# Patient Record
Sex: FEMALE | Race: BLACK OR AFRICAN AMERICAN | NOT HISPANIC OR LATINO | Employment: UNEMPLOYED | URBAN - METROPOLITAN AREA
[De-identification: names, ages, dates, MRNs, and addresses within clinical notes are randomized per-mention and may not be internally consistent; named-entity substitution may affect disease eponyms.]

---

## 2017-11-28 ENCOUNTER — HOSPITAL ENCOUNTER (EMERGENCY)
Facility: HOSPITAL | Age: 62
Discharge: HOME | End: 2017-11-28
Attending: FAMILY MEDICINE
Payer: MEDICAID

## 2017-11-28 VITALS
DIASTOLIC BLOOD PRESSURE: 85 MMHG | TEMPERATURE: 99 F | HEART RATE: 63 BPM | HEIGHT: 68 IN | RESPIRATION RATE: 18 BRPM | SYSTOLIC BLOOD PRESSURE: 180 MMHG | OXYGEN SATURATION: 98 % | WEIGHT: 200 LBS | BODY MASS INDEX: 30.31 KG/M2

## 2017-11-28 DIAGNOSIS — S39.012A LUMBAR STRAIN, INITIAL ENCOUNTER: Primary | ICD-10-CM

## 2017-11-28 DIAGNOSIS — M54.42 ACUTE LEFT-SIDED LOW BACK PAIN WITH LEFT-SIDED SCIATICA: ICD-10-CM

## 2017-11-28 PROCEDURE — 99284 EMERGENCY DEPT VISIT MOD MDM: CPT | Mod: ,,, | Performed by: FAMILY MEDICINE

## 2017-11-28 PROCEDURE — 96372 THER/PROPH/DIAG INJ SC/IM: CPT

## 2017-11-28 PROCEDURE — 63600175 PHARM REV CODE 636 W HCPCS: Performed by: FAMILY MEDICINE

## 2017-11-28 PROCEDURE — 99284 EMERGENCY DEPT VISIT MOD MDM: CPT | Mod: 25

## 2017-11-28 RX ORDER — ORPHENADRINE CITRATE 30 MG/ML
60 INJECTION INTRAMUSCULAR; INTRAVENOUS
Status: COMPLETED | OUTPATIENT
Start: 2017-11-28 | End: 2017-11-28

## 2017-11-28 RX ORDER — KETOROLAC TROMETHAMINE 30 MG/ML
30 INJECTION, SOLUTION INTRAMUSCULAR; INTRAVENOUS
Status: COMPLETED | OUTPATIENT
Start: 2017-11-28 | End: 2017-11-28

## 2017-11-28 RX ORDER — CYCLOBENZAPRINE HCL 10 MG
10 TABLET ORAL 3 TIMES DAILY PRN
Qty: 20 TABLET | Refills: 0 | Status: SHIPPED | OUTPATIENT
Start: 2017-11-28 | End: 2018-02-16

## 2017-11-28 RX ORDER — TRAMADOL HYDROCHLORIDE 50 MG/1
50-100 TABLET ORAL EVERY 6 HOURS PRN
Qty: 16 TABLET | Refills: 0 | Status: SHIPPED | OUTPATIENT
Start: 2017-11-28 | End: 2019-09-11

## 2017-11-28 RX ADMIN — KETOROLAC TROMETHAMINE 30 MG: 30 INJECTION, SOLUTION INTRAMUSCULAR at 09:11

## 2017-11-28 RX ADMIN — ORPHENADRINE CITRATE 60 MG: 30 INJECTION INTRAMUSCULAR; INTRAVENOUS at 09:11

## 2017-11-29 NOTE — ED TRIAGE NOTES
Patient reports lower back pain that radiates down the right leg. Patient was lifting and pushing heavy packages earlier today before the pain began. Patient took ibuprofen at approx. 1530 today.

## 2017-11-29 NOTE — ED PROVIDER NOTES
Encounter Date: 11/28/2017    SCRIBE #1 NOTE: I, Jadyn Boyce, am scribing for, and in the presence of,  Dr. Manzo. I have scribed the following portions of the note - Other sections scribed: HPI, ROS, Physical Exam.       History     Chief Complaint   Patient presents with    Back Pain     picked up heavy tv c/o of R leg pain      Time patient was seen by the provider: 8:40 PM      The patient is a 62 y.o. female with hx of: back pain with sciatica that presents to the ED with a complaint of back pain with associated leg pain. Pt tried to move a heavy TV and feels as thought she might have strained her sciatic nerve. She endorses right foot swelling as well. Pt was unable to drive here herself due to severity of pain.        The history is provided by the patient and medical records.     Review of patient's allergies indicates:  No Known Allergies  Past Medical History:   Diagnosis Date    Arthritis     Back pain with sciatica     Diabetes mellitus     Hypertension     Stroke      History reviewed. No pertinent surgical history.  Family History   Problem Relation Age of Onset    Cancer Brother      Social History   Substance Use Topics    Smoking status: Smoker, Current Status Unknown     Packs/day: 0.50     Years: 40.00     Types: Cigarettes    Smokeless tobacco: Never Used    Alcohol use No      Comment: Recently stopped     Review of Systems   Musculoskeletal: Positive for back pain (radiates to right leg).       Physical Exam     Initial Vitals [11/28/17 1827]   BP Pulse Resp Temp SpO2   (!) 170/90 86 18 -- 100 %      MAP       116.67         Physical Exam    Constitutional:   Pt is obviously uncomfortable but cooperative with exam.    HENT:   Head: Normocephalic and atraumatic.   Cardiovascular: Normal rate, regular rhythm and normal heart sounds.   Pulmonary/Chest: Breath sounds normal.   Abdominal: Soft. Bowel sounds are normal.   Musculoskeletal:   Pt's lower back is tender in the right flank  and midline lumbar spine with no gross deformities and no visual abnormalities. Straight leg raises reproduces pt's right leg pain.   Neurological:   No acute focal deficits .          ED Course   Procedures  Labs Reviewed - No data to display          Medical Decision Making:   History:   Old Medical Records: I decided to obtain old medical records.  ED Management:  Lumbar muscle spasm precipitated by moving a heavy object and has no red flags suggestive of infection, cauda equina syndrome, or other emergent nerve root compression or circulatory compromise.  She is treated with ketorolac and Norflex for acute symptoms and discharged on tramadol and Flexeril for symptom control.  PCP follow-up recommended            Scribe Attestation:   Scribe #1: I performed the above scribed service and the documentation accurately describes the services I performed. I attest to the accuracy of the note.            ED Course      Clinical Impression:   There were no encounter diagnoses.                           Carlos Manzo MD  11/29/17 1010

## 2018-02-16 ENCOUNTER — HOSPITAL ENCOUNTER (EMERGENCY)
Facility: OTHER | Age: 63
Discharge: HOME | End: 2018-02-16
Attending: EMERGENCY MEDICINE
Payer: MEDICARE

## 2018-02-16 VITALS
RESPIRATION RATE: 17 BRPM | DIASTOLIC BLOOD PRESSURE: 87 MMHG | SYSTOLIC BLOOD PRESSURE: 161 MMHG | HEART RATE: 84 BPM | TEMPERATURE: 98 F | OXYGEN SATURATION: 98 %

## 2018-02-16 DIAGNOSIS — M54.41 CHRONIC RIGHT-SIDED LOW BACK PAIN WITH RIGHT-SIDED SCIATICA: ICD-10-CM

## 2018-02-16 DIAGNOSIS — M54.31 RIGHT SIDED SCIATICA: Primary | ICD-10-CM

## 2018-02-16 DIAGNOSIS — G89.29 CHRONIC RIGHT-SIDED LOW BACK PAIN WITH RIGHT-SIDED SCIATICA: ICD-10-CM

## 2018-02-16 LAB
AMPHET+METHAMPHET UR QL: NEGATIVE
BARBITURATES UR QL SCN>200 NG/ML: NEGATIVE
BENZODIAZ UR QL SCN>200 NG/ML: NORMAL
BILIRUB UR QL STRIP: NEGATIVE
BZE UR QL SCN: NEGATIVE
CANNABINOIDS UR QL SCN: NEGATIVE
CLARITY UR: CLEAR
COLOR UR: YELLOW
CREAT UR-MCNC: 75.2 MG/DL
GLUCOSE UR QL STRIP: NEGATIVE
HGB UR QL STRIP: NEGATIVE
KETONES UR QL STRIP: NEGATIVE
LEUKOCYTE ESTERASE UR QL STRIP: NEGATIVE
METHADONE UR QL SCN>300 NG/ML: NEGATIVE
NITRITE UR QL STRIP: NEGATIVE
OPIATES UR QL SCN: NORMAL
PCP UR QL SCN>25 NG/ML: NEGATIVE
PH UR STRIP: 5 [PH] (ref 5–8)
POCT GLUCOSE: 90 MG/DL (ref 70–110)
PROT UR QL STRIP: NEGATIVE
SP GR UR STRIP: 1.01 (ref 1–1.03)
TOXICOLOGY INFORMATION: NORMAL
URN SPEC COLLECT METH UR: NORMAL
UROBILINOGEN UR STRIP-ACNC: NEGATIVE EU/DL

## 2018-02-16 PROCEDURE — 99284 EMERGENCY DEPT VISIT MOD MDM: CPT | Mod: 25

## 2018-02-16 PROCEDURE — 96372 THER/PROPH/DIAG INJ SC/IM: CPT

## 2018-02-16 PROCEDURE — 81003 URINALYSIS AUTO W/O SCOPE: CPT | Mod: 59

## 2018-02-16 PROCEDURE — 63600175 PHARM REV CODE 636 W HCPCS: Performed by: EMERGENCY MEDICINE

## 2018-02-16 PROCEDURE — 82962 GLUCOSE BLOOD TEST: CPT

## 2018-02-16 PROCEDURE — 80307 DRUG TEST PRSMV CHEM ANLYZR: CPT

## 2018-02-16 RX ORDER — IBUPROFEN 400 MG/1
400 TABLET ORAL 3 TIMES DAILY PRN
Qty: 20 TABLET | Refills: 0 | Status: ON HOLD | OUTPATIENT
Start: 2018-02-16 | End: 2018-12-20 | Stop reason: HOSPADM

## 2018-02-16 RX ORDER — KETOROLAC TROMETHAMINE 30 MG/ML
15 INJECTION, SOLUTION INTRAMUSCULAR; INTRAVENOUS
Status: COMPLETED | OUTPATIENT
Start: 2018-02-16 | End: 2018-02-16

## 2018-02-16 RX ORDER — CYCLOBENZAPRINE HCL 10 MG
10 TABLET ORAL 3 TIMES DAILY PRN
Qty: 12 TABLET | Refills: 0 | Status: ON HOLD | OUTPATIENT
Start: 2018-02-16 | End: 2018-12-20 | Stop reason: HOSPADM

## 2018-02-16 RX ADMIN — KETOROLAC TROMETHAMINE 15 MG: 30 INJECTION, SOLUTION INTRAMUSCULAR at 04:02

## 2018-02-16 NOTE — ED NOTES
Pt requesting vicodin and to speak with MD. Explained to pt opiate policy. MD to bedside to speak with pt

## 2018-02-16 NOTE — ED TRIAGE NOTES
"Pt presents with right sided sciatica, onset this morning with waking up. Pt reports pain to bilateral lower back and right leg. Pt reports having trouble ambulating due to pain. Pt also reporting right arm weakness. Pt reports right ankle is swollen, no marked edema noted. Pt has hx sciatica but reports "its never this bad". Pt denies any recent injury or trauma. Pt has been taking naproxen, tylenol, and ibuprofen, denies taking any opiates for pain. Pt reports taking a dose of tylenol and ibuprofen every hour since 1200. Pt has hx of stroke, denies any resulting deficits. Pt not following directions for NIH scale, having to repeats questions and commands a few times until pt responds. Pt was alert answering questions in their entirety for MD, pt now acting drowsy and closing eyes after starting a task or sentence. Pt arouses to voice.   "

## 2018-02-16 NOTE — ED NOTES
Pt requesting food, explained to pt the risk of eating prior to receiving CT results. Reviewed with ching OROURKE to provide pt with crackers and juice.

## 2018-02-16 NOTE — ED PROVIDER NOTES
Encounter Date: 2/16/2018    SCRIBE #1 NOTE: I, Laura Alcantara, am scribing for, and in the presence of, Dr. Issa.       History     Chief Complaint   Patient presents with    Sciatica     Has history of this but states it has never been this bad before.  States that since early this morning, she has been taking 2 Naproxen every hour, 2 Tylenol and 2 Ibuprofen     Time seen by provider: 4:00 PM    This is a 62 y.o. female, with a history of sciatica, DM, HTN, and stroke, who presents with complaint of back pain that began this morning. She states pain radiates down the right leg. She describes pain as sharp. She rates pain as a 9 out of 10. She reports taking naproxen and tylenol with little relief. She states current episodes the worse than previous episodes. She denies urinary incontinence, bowel incontinence, numbness, or weakness. She denies trauma or injury. Patient states she needed assistance getting out of bed secondary to pain.       The history is provided by the patient.     Review of patient's allergies indicates:  No Known Allergies  Past Medical History:   Diagnosis Date    Arthritis     Back pain with sciatica     Diabetes mellitus     Hypertension     Stroke      History reviewed. No pertinent surgical history.  Family History   Problem Relation Age of Onset    Cancer Brother      Social History   Substance Use Topics    Smoking status: Current Some Day Smoker     Packs/day: 0.50     Years: 40.00     Types: Cigarettes    Smokeless tobacco: Never Used    Alcohol use No      Comment: Recently stopped     Review of Systems   Constitutional: Negative for fever.   HENT: Negative for sore throat.    Respiratory: Negative for shortness of breath.    Cardiovascular: Negative for chest pain.   Gastrointestinal: Negative for nausea.   Genitourinary: Negative for dysuria.        Negative for urinary incontinence. Negative for bowel incontinence.    Musculoskeletal: Positive for back pain.         Positive for right leg pain.    Skin: Negative for rash.   Neurological: Negative for weakness and numbness.   Hematological: Does not bruise/bleed easily.       Physical Exam     Initial Vitals [02/16/18 1531]   BP Pulse Resp Temp SpO2   (!) 136/91 88 17 98.1 °F (36.7 °C) (!) 88 %      MAP       106         Physical Exam    Nursing note and vitals reviewed.  Constitutional: She appears well-developed and well-nourished. She is not diaphoretic. No distress.   HENT:   Head: Normocephalic and atraumatic.   Right Ear: External ear normal.   Left Ear: External ear normal.   Eyes: EOM are normal. Right eye exhibits no discharge. Left eye exhibits no discharge.   Neck: Normal range of motion.   Cardiovascular: Normal rate, regular rhythm and normal heart sounds. Exam reveals no gallop and no friction rub.    No murmur heard.  Pulmonary/Chest: Breath sounds normal. No respiratory distress. She has no wheezes. She has no rhonchi. She has no rales.   Abdominal: Soft. There is no tenderness. There is no rebound and no guarding.   Musculoskeletal: Normal range of motion. She exhibits no edema.   Midline lumbar tenderness with no crepitus, step-offs, or deformities. Bilateral lumbar tenderness without significant spasm. Positive straight leg test on the right at 10 degrees. Negative straight leg test on the left. Bilateral lower extremity strength 5/5 tested at hip, knees, ankles, and great toes.    Neurological: She is alert and oriented to person, place, and time.   Slowed speech with no slurring. Delayed responses to questions. Normal sensation intact to bilateral lower extremities.    Skin: Skin is warm and dry. No rash and no abscess noted. No erythema. No pallor.         ED Course   Procedures  Labs Reviewed   URINALYSIS   DRUG SCREEN PANEL, URINE EMERGENCY            Imaging Results          CT Head Without Contrast (In process)                 Medical Decision Making:   Clinical Tests:   Lab Tests: Ordered and  Reviewed  Radiological Study: Ordered and Reviewed  ED Management:  Patient presents complaining of acute on chronic right back and leg pain.  Reviewing her chart she does extensive history of sciatica.  She denies any trauma this time.  She has absolute no weakness at all.  No loss of bladder or bowel control.  No neurologic other symptoms.    Patient also has delayed speech is not slurred, but she seems delayed in her responses.  She does have history of stroke, but has no other signs of stroke.  She denies any drug use today.  Denies any narcotic pain medicines at home.  CAT scan of her head with no acute abnormalities.  She does seem to clear while here, and her urine drug screen does reveal both presumed opioids and benzodiazepines.    I plan to treat her chronic back pain with ketorolac and Flexeril.  She asked multiple times for Vicodin or tramadol.  Extensive discussion with her that I will not prescribe narcotics for chronic back pain.  She is very upset with this.    While we are waiting for her urinalysis, she is noted to get up and walk around the hallway without difficulty.  She ask repeatedly for food and sandwiches.    At time of discharge she indicates that she is very dissatisfied with her treatment and that she did not get narcotics.  She reports that she has been recording all of our conversations.     I attempted to have further discharge planning and counseling, but she refuses to talk to me any longer.    ALTON Issa M.D.  02/16/2018  9:54 PM                Attending Attestation:           Physician Attestation for Scribe:  Physician Attestation Statement for Scribe #1: I, Dr. Issa, reviewed documentation, as scribed by Laura Alcantara in my presence, and it is both accurate and complete.                    Clinical Impression:     1. Right sided sciatica    2. Chronic right-sided low back pain with right-sided sciatica                               Major Issa MD  02/16/18 7062

## 2018-02-16 NOTE — ED NOTES
Pt removing pulse ox and BP cuff so that she can leave to go get a meal. Pt insistent upon eating reports she has not eaten since yesterday at 0600. Pt reports she checked her BG this AM and it was 256. Rechecked BG, BG 90. Provided pt with more crackers and juice. Pt able to ambulate to restroom, pt gait slightly unsteady.

## 2018-02-17 NOTE — ED NOTES
"Pt voicing concern about not being sent home with opiates. Pt reporting "this is unheard of, I can hardly walk" "these people aren't doing anything for me." Explained [facility] policy and offered for pt to speak with the MD. Pt declining speaking with the doctor again. Pt refusing final set of vitals signs. Pt reporting that she recorded conversations between her and the doctor incase they are needed later.   "

## 2018-02-17 NOTE — ED NOTES
NEURO: Pt AAO x 4; see hpi   CARDIAC: Regular rhythm auscultated, pt denies chest pain  RESPIRATORY: Respirations even and unlabored.   MUSCULOSKELETAL: Active ROM noted to extremities

## 2018-02-17 NOTE — ED NOTES
Pt ambulating out of room and holding on to wall for support, offered pt wheelchair. Pt accepted and placed in front of ramp for ride. Pt originally declined ibuprofen and flexeril, pt now requesting these prescriptions from MD. MD spoke with pt and prescriptions provided.

## 2018-05-21 ENCOUNTER — OFFICE VISIT (OUTPATIENT)
Dept: URGENT CARE | Facility: CLINIC | Age: 63
End: 2018-05-21
Payer: MEDICARE

## 2018-05-21 VITALS
DIASTOLIC BLOOD PRESSURE: 82 MMHG | SYSTOLIC BLOOD PRESSURE: 160 MMHG | TEMPERATURE: 98 F | HEART RATE: 77 BPM | BODY MASS INDEX: 28.49 KG/M2 | HEIGHT: 68 IN | RESPIRATION RATE: 16 BRPM | OXYGEN SATURATION: 98 % | WEIGHT: 188 LBS

## 2018-05-21 DIAGNOSIS — G89.29 ACUTE EXACERBATION OF CHRONIC LOW BACK PAIN: Primary | ICD-10-CM

## 2018-05-21 DIAGNOSIS — M54.50 ACUTE EXACERBATION OF CHRONIC LOW BACK PAIN: Primary | ICD-10-CM

## 2018-05-21 PROCEDURE — 99203 OFFICE O/P NEW LOW 30 MIN: CPT | Mod: 25,S$GLB,, | Performed by: NURSE PRACTITIONER

## 2018-05-21 PROCEDURE — 96372 THER/PROPH/DIAG INJ SC/IM: CPT | Mod: S$GLB,,, | Performed by: FAMILY MEDICINE

## 2018-05-21 RX ORDER — CYCLOBENZAPRINE HCL 10 MG
10 TABLET ORAL NIGHTLY
Qty: 15 TABLET | Refills: 0 | Status: SHIPPED | OUTPATIENT
Start: 2018-05-21 | End: 2018-06-05

## 2018-05-21 RX ORDER — KETOROLAC TROMETHAMINE 30 MG/ML
30 INJECTION, SOLUTION INTRAMUSCULAR; INTRAVENOUS
Status: COMPLETED | OUTPATIENT
Start: 2018-05-21 | End: 2018-05-21

## 2018-05-21 RX ADMIN — KETOROLAC TROMETHAMINE 30 MG: 30 INJECTION, SOLUTION INTRAMUSCULAR; INTRAVENOUS at 12:05

## 2018-05-21 NOTE — PROGRESS NOTES
"Subjective:       Patient ID: Kaila Scott is a 62 y.o. female.    Vitals:  height is 5' 8" (1.727 m) and weight is 85.3 kg (188 lb). Her temperature is 98.3 °F (36.8 °C). Her blood pressure is 160/82 (abnormal) and her pulse is 77. Her respiration is 16 and oxygen saturation is 98%.     Chief Complaint: Back Pain    Pt states her sciatica is acting up, but pt states she did trip over her cat yesterday.   HER SPEECH IS SLURRED AND HER CONVERSATIONS ARE JUMPING BACK AND FORTH. SHE IS WEARING 2 DIFFERENT SHOES. SHE DENIES HAVING A HISTORY OF BACK PAIN AND DENIES USING PAIN MEDICINE IN THE PAST FOR HER RECORDED BACK PAIN HISTORY.   SHE DID ASK SPECIFICALLY FOR VI CODAN AND STATED NOTHING ELSE WORKS FOR HER BACK PAIN.  REPORTS SHOOTING PAIN FROM LOWER BACK TO HIP.    HER BP /82 AND RECOMMENDED PATIENT FOLLOW UP WITH [facility] FOR WELLNESS EXAM.      Back Pain   This is a recurrent problem. The problem occurs constantly. The problem is unchanged. The quality of the pain is described as stabbing. The pain is at a severity of 9/10. The pain is severe. The symptoms are aggravated by bending, standing and sitting. Pertinent negatives include no abdominal pain, bladder incontinence, bowel incontinence, dysuria or numbness.     Review of Systems   Constitution: Negative for malaise/fatigue.   Skin: Negative for color change and rash.   Musculoskeletal: Positive for back pain. Negative for muscle cramps, muscle weakness and stiffness.   Gastrointestinal: Negative for abdominal pain and bowel incontinence.   Genitourinary: Negative for bladder incontinence, dysuria, hematuria and urgency.   Neurological: Negative for disturbances in coordination and numbness.       Objective:      Physical Exam   Constitutional: She is oriented to person, place, and time. She appears well-developed and well-nourished. She is cooperative.  Non-toxic appearance. She does not appear ill. No distress.   HENT:   Head: Normocephalic " and atraumatic.   Right Ear: Hearing, tympanic membrane, external ear and ear canal normal.   Left Ear: Hearing, tympanic membrane, external ear and ear canal normal.   Nose: Nose normal. No mucosal edema, rhinorrhea or nasal deformity. No epistaxis. Right sinus exhibits no maxillary sinus tenderness and no frontal sinus tenderness. Left sinus exhibits no maxillary sinus tenderness and no frontal sinus tenderness.   Mouth/Throat: Uvula is midline, oropharynx is clear and moist and mucous membranes are normal. No trismus in the jaw. Normal dentition. No uvula swelling. No posterior oropharyngeal erythema.   Eyes: Conjunctivae and lids are normal. No scleral icterus.   Sclera clear bilat   Neck: Trachea normal, full passive range of motion without pain and phonation normal. Neck supple.   Cardiovascular: Normal rate, regular rhythm, normal heart sounds, intact distal pulses and normal pulses.    Pulmonary/Chest: Effort normal and breath sounds normal. No respiratory distress.   Abdominal: Soft. Normal appearance and bowel sounds are normal. She exhibits no distension. There is no tenderness.   Musculoskeletal: She exhibits no edema or deformity.        Lumbar back: She exhibits decreased range of motion and tenderness. She exhibits no bony tenderness, no swelling, no edema, no deformity, no laceration and normal pulse.   NEGATIVE STRAIGHT LEG RAISE.  ABLE TO AMBULATE WITH DIFFICULTY. NEGATIVE TTP ALONG SPINE.   Neurological: She is alert and oriented to person, place, and time. She exhibits normal muscle tone. Coordination normal.   Skin: Skin is warm, dry and intact. She is not diaphoretic. No pallor.   Psychiatric: She has a normal mood and affect. Her speech is normal and behavior is normal. Judgment and thought content normal. Cognition and memory are normal.   Nursing note and vitals reviewed.      Assessment:       1. Acute exacerbation of chronic low back pain        Plan:         Acute exacerbation of chronic  low back pain  -     ketorolac injection 30 mg; Inject 1 mL (30 mg total) into the muscle one time.  -     cyclobenzaprine (FLEXERIL) 10 MG tablet; Take 1 tablet (10 mg total) by mouth every evening.  Dispense: 15 tablet; Refill: 0      Patient Instructions   TAKE MOTRIN 400MG EVERY 6 HOURS AS NEEDED FOR PAIN.  YOU RECEIVED A TORADOL INJECTION TODAY, START THE MOTRIN TOMORROW.  TAKE RX FLEXERIL AT NIGHT, BEFORE BED, TO HELP WITH SLEEP. PLEASE DO NOT OPERATE HEAVY MACHINERY.    YOU NEED TO FOLLOW UP WITH [facility] IF YOUR BACK PAIN PERSISTS IN THE NEXT WEEK FOR BACK PAIN MANAGEMENT.    APPLY ICE FOR 20MIN AT A TIME SEVERAL TIMES THROUGHOUT THE DAY UNTIL TOMORROW AFTERNOON.   THEN, STARTING Wednesday, YOU CAN APPLY HEAT TO THE SITE FOR COMFORT.    SEE HANDOUT ON BACK EXERCISES.          Back Pain (Acute or Chronic)    Back pain is one of the most common problems. The good news is that most people feel better in 1 to 2 weeks, and most of the rest in 1 to 2 months. Most people can remain active.  People experience and describe pain differently; not everyone is the same.  · The pain can be sharp, stabbing, shooting, aching, cramping or burning.  · Movement, standing, bending, lifting, sitting, or walking may worsen pain.  · It can be localized to one spot or area, or it can be more generalized.  · It can spread or radiate upwards, to the front, or go down your arms or legs (sciatica).  · It can cause muscle spasm.  Most of the time, mechanical problems with the muscles or spine cause the pain. Mechanical problems are usually caused by an injury to the muscles or ligaments. While illness can cause back pain, it is usually not caused by a serious illness. Mechanical problems include:   · Physical activity such as sports, exercise, work, or normal activity  · Overexertion, lifting, pushing, pulling incorrectly or too aggressively  · Sudden twisting, bending, or stretching from an accident, or accidental  movement  · Poor posture  · Stretching or moving wrong, without noticing pain at the time  · Poor coordination, lack of regular exercise (check with your doctor about this)  · Spinal disc disease or arthritis  · Stress  Pain can also be related to pregnancy, or illness like appendicitis, bladder or kidney infections, pelvic infections, and many other things.  Acute back pain usually gets better in 1 to 2 weeks. Back pain related to disk disease, arthritis in the spinal joints or spinal stenosis (narrowing of the spinal canal) can become chronic and last for months or years.  Unless you had a physical injury (for example, a car accident or fall) X-rays are usually not needed for the initial evaluation of back pain. If pain continues and does not respond to medical treatment, X-rays and other tests may be needed.  Home care  Try these home care recommendations:  · When in bed, try to find a position of comfort. A firm mattress is best. Try lying flat on your back with pillows under your knees. You can also try lying on your side with your knees bent up towards your chest and a pillow between your knees.  · At first, do not try to stretch out the sore spots. If there is a strain, it is not like the good soreness you get after exercising without an injury. In this case, stretching may make it worse.  · Avoid prolong sitting, long car rides, or travel. This puts more stress on the lower back than standing or walking.  · During the first 24 to 72 hours after an acute injury or flare up of chronic back pain, apply an ice pack to the painful area for 20 minutes and then remove it for 20 minutes. Do this over a period of 60 to 90 minutes or several times a day. This will reduce swelling and pain. Wrap the ice pack in a thin towel or plastic to protect your skin.  · You can start with ice, then switch to heat. Heat (hot shower, hot bath, or heating pad) reduces pain and works well for muscle spasms. Heat can be applied to the  painful area for 20 minutes then remove it for 20 minutes. Do this over a period of 60 to 90 minutes or several times a day. Do not sleep on a heating pad. It can lead to skin burns or tissue damage.  · You can alternate ice and heat therapy. Talk with your doctor about the best treatment for your back pain.  · Therapeutic massage can help relax the back muscles without stretching them.  · Be aware of safe lifting methods and do not lift anything without stretching first.  Medicines  Talk to your doctor before using medicine, especially if you have other medical problems or are taking other medicines.  · You may use over-the-counter medicine as directed on the bottle to control pain, unless another pain medicine was prescribed. If you have chronic conditions like diabetes, liver or kidney disease, stomach ulcers, or gastrointestinal bleeding, or are taking blood thinners, talk to your doctor before taking any medicine.  · Be careful if you are given a prescription medicines, narcotics, or medicine for muscle spasms. They can cause drowsiness, affect your coordination, reflexes, and judgement. Do not drive or operate heavy machinery.  Follow-up care  Follow up with your healthcare provider, or as advised.   A radiologist will review any X-rays that were taken. Your provide will notify you of any new findings that may affect your care.  Call 911  Call emergency services if any of the following occur:  · Trouble breathing  · Confusion  · Very drowsy or trouble awakening  · Fainting or loss of consciousness  · Rapid or very slow heart rate  · Loss of bowel or bladder control  When to seek medical advice  Call your healthcare provider right away if any of these occur:   · Pain becomes worse or spreads to your legs  · Weakness or numbness in one or both legs  · Numbness in the groin or genital area  Date Last Reviewed: 7/1/2016  © 3763-5643 [employer]. [address] [place] [address], [place] [address]. All rights  reserved. This information is not intended as a substitute for professional medical care. Always follow your healthcare professional's instructions.        Back Exercises: Lower Back Stretch    To start, sit in a chair with your feet flat on the floor. Shift your weight slightly forward. Relax, and keep your ears, shoulders, and hips aligned.  · Sit with your feet well apart.  · Bend forward and touch the floor with the backs of your hands. Relax and let your body drop.  · Hold for 20 seconds. Return to starting position.  · Repeat 2 times.   Date Last Reviewed: 8/16/2015  © 9728-1006 [employer]. [address] [place] [address], [place] [address]. All rights reserved. This information is not intended as a substitute for professional medical care. Always follow your healthcare professional's instructions.

## 2018-05-21 NOTE — PATIENT INSTRUCTIONS
TAKE MOTRIN 400MG EVERY 6 HOURS AS NEEDED FOR PAIN.  YOU RECEIVED A TORADOL INJECTION TODAY, START THE MOTRIN TOMORROW.  TAKE RX FLEXERIL AT NIGHT, BEFORE BED, TO HELP WITH SLEEP. PLEASE DO NOT OPERATE HEAVY MACHINERY.    YOU NEED TO FOLLOW UP WITH DAUGHTERS OF [facility] IF YOUR BACK PAIN PERSISTS IN THE NEXT WEEK FOR BACK PAIN MANAGEMENT.    APPLY ICE FOR 20MIN AT A TIME SEVERAL TIMES THROUGHOUT THE DAY UNTIL TOMORROW AFTERNOON.   THEN, STARTING Wednesday, YOU CAN APPLY HEAT TO THE SITE FOR COMFORT.    SEE HANDOUT ON BACK EXERCISES.          Back Pain (Acute or Chronic)    Back pain is one of the most common problems. The good news is that most people feel better in 1 to 2 weeks, and most of the rest in 1 to 2 months. Most people can remain active.  People experience and describe pain differently; not everyone is the same.  · The pain can be sharp, stabbing, shooting, aching, cramping or burning.  · Movement, standing, bending, lifting, sitting, or walking may worsen pain.  · It can be localized to one spot or area, or it can be more generalized.  · It can spread or radiate upwards, to the front, or go down your arms or legs (sciatica).  · It can cause muscle spasm.  Most of the time, mechanical problems with the muscles or spine cause the pain. Mechanical problems are usually caused by an injury to the muscles or ligaments. While illness can cause back pain, it is usually not caused by a serious illness. Mechanical problems include:   · Physical activity such as sports, exercise, work, or normal activity  · Overexertion, lifting, pushing, pulling incorrectly or too aggressively  · Sudden twisting, bending, or stretching from an accident, or accidental movement  · Poor posture  · Stretching or moving wrong, without noticing pain at the time  · Poor coordination, lack of regular exercise (check with your doctor about this)  · Spinal disc disease or arthritis  · Stress  Pain can also be related to pregnancy, or illness  like appendicitis, bladder or kidney infections, pelvic infections, and many other things.  Acute back pain usually gets better in 1 to 2 weeks. Back pain related to disk disease, arthritis in the spinal joints or spinal stenosis (narrowing of the spinal canal) can become chronic and last for months or years.  Unless you had a physical injury (for example, a car accident or fall) X-rays are usually not needed for the initial evaluation of back pain. If pain continues and does not respond to medical treatment, X-rays and other tests may be needed.  Home care  Try these home care recommendations:  · When in bed, try to find a position of comfort. A firm mattress is best. Try lying flat on your back with pillows under your knees. You can also try lying on your side with your knees bent up towards your chest and a pillow between your knees.  · At first, do not try to stretch out the sore spots. If there is a strain, it is not like the good soreness you get after exercising without an injury. In this case, stretching may make it worse.  · Avoid prolong sitting, long car rides, or travel. This puts more stress on the lower back than standing or walking.  · During the first 24 to 72 hours after an acute injury or flare up of chronic back pain, apply an ice pack to the painful area for 20 minutes and then remove it for 20 minutes. Do this over a period of 60 to 90 minutes or several times a day. This will reduce swelling and pain. Wrap the ice pack in a thin towel or plastic to protect your skin.  · You can start with ice, then switch to heat. Heat (hot shower, hot bath, or heating pad) reduces pain and works well for muscle spasms. Heat can be applied to the painful area for 20 minutes then remove it for 20 minutes. Do this over a period of 60 to 90 minutes or several times a day. Do not sleep on a heating pad. It can lead to skin burns or tissue damage.  · You can alternate ice and heat therapy. Talk with your doctor  about the best treatment for your back pain.  · Therapeutic massage can help relax the back muscles without stretching them.  · Be aware of safe lifting methods and do not lift anything without stretching first.  Medicines  Talk to your doctor before using medicine, especially if you have other medical problems or are taking other medicines.  · You may use over-the-counter medicine as directed on the bottle to control pain, unless another pain medicine was prescribed. If you have chronic conditions like diabetes, liver or kidney disease, stomach ulcers, or gastrointestinal bleeding, or are taking blood thinners, talk to your doctor before taking any medicine.  · Be careful if you are given a prescription medicines, narcotics, or medicine for muscle spasms. They can cause drowsiness, affect your coordination, reflexes, and judgement. Do not drive or operate heavy machinery.  Follow-up care  Follow up with your healthcare provider, or as advised.   A radiologist will review any X-rays that were taken. Your provide will notify you of any new findings that may affect your care.  Call 911  Call emergency services if any of the following occur:  · Trouble breathing  · Confusion  · Very drowsy or trouble awakening  · Fainting or loss of consciousness  · Rapid or very slow heart rate  · Loss of bowel or bladder control  When to seek medical advice  Call your healthcare provider right away if any of these occur:   · Pain becomes worse or spreads to your legs  · Weakness or numbness in one or both legs  · Numbness in the groin or genital area  Date Last Reviewed: 7/1/2016  © 1035-5436 [employer]. [address] [place] [address]. All rights reserved. This information is not intended as a substitute for professional medical care. Always follow your healthcare professional's instructions.        Back Exercises: Lower Back Stretch    To start, sit in a chair with your feet flat on the floor. Shift your  weight slightly forward. Relax, and keep your ears, shoulders, and hips aligned.  · Sit with your feet well apart.  · Bend forward and touch the floor with the backs of your hands. Relax and let your body drop.  · Hold for 20 seconds. Return to starting position.  · Repeat 2 times.   Date Last Reviewed: 8/16/2015  © 5058-0492 [employer]. [address] [place] [id number]. All rights reserved. This information is not intended as a substitute for professional medical care. Always follow your healthcare professional's instructions.

## 2018-12-10 ENCOUNTER — OFFICE VISIT (OUTPATIENT)
Dept: URGENT CARE | Facility: CLINIC | Age: 63
End: 2018-12-10
Payer: MEDICARE

## 2018-12-10 VITALS
BODY MASS INDEX: 28.49 KG/M2 | SYSTOLIC BLOOD PRESSURE: 169 MMHG | HEIGHT: 68 IN | WEIGHT: 188 LBS | HEART RATE: 77 BPM | TEMPERATURE: 99 F | OXYGEN SATURATION: 98 % | DIASTOLIC BLOOD PRESSURE: 94 MMHG

## 2018-12-10 DIAGNOSIS — M46.1 SACROILIITIS: Primary | ICD-10-CM

## 2018-12-10 DIAGNOSIS — F17.200 SMOKER: ICD-10-CM

## 2018-12-10 PROCEDURE — 99214 OFFICE O/P EST MOD 30 MIN: CPT | Mod: 25,S$GLB,, | Performed by: INTERNAL MEDICINE

## 2018-12-10 PROCEDURE — 96372 THER/PROPH/DIAG INJ SC/IM: CPT | Mod: S$GLB,,, | Performed by: INTERNAL MEDICINE

## 2018-12-10 RX ORDER — HYDROCODONE BITARTRATE AND ACETAMINOPHEN 7.5; 325 MG/1; MG/1
1 TABLET ORAL EVERY 6 HOURS PRN
Qty: 12 TABLET | Refills: 0 | Status: ON HOLD | OUTPATIENT
Start: 2018-12-10 | End: 2018-12-20 | Stop reason: HOSPADM

## 2018-12-10 RX ORDER — KETOROLAC TROMETHAMINE 30 MG/ML
60 INJECTION, SOLUTION INTRAMUSCULAR; INTRAVENOUS
Status: COMPLETED | OUTPATIENT
Start: 2018-12-10 | End: 2018-12-10

## 2018-12-10 RX ORDER — METHYLPREDNISOLONE 4 MG/1
TABLET ORAL
Qty: 1 PACKAGE | Refills: 0 | Status: SHIPPED | OUTPATIENT
Start: 2018-12-10 | End: 2019-09-11

## 2018-12-10 RX ADMIN — KETOROLAC TROMETHAMINE 60 MG: 30 INJECTION, SOLUTION INTRAMUSCULAR; INTRAVENOUS at 11:12

## 2018-12-10 NOTE — PROGRESS NOTES
"Subjective:       Patient ID: Kaila Scott is a 63 y.o. female.    Vitals:  height is 5' 8" (1.727 m) and weight is 85.3 kg (188 lb). Her temperature is 98.9 °F (37.2 °C). Her blood pressure is 169/94 (abnormal) and her pulse is 77. Her oxygen saturation is 98%.     Chief Complaint: Sciatica and Back Pain    Back Pain   This is a new problem. The current episode started yesterday. The problem occurs intermittently. The problem has been gradually worsening since onset. The pain is present in the sacro-iliac and lumbar spine. The quality of the pain is described as aching and stabbing. The pain is at a severity of 7/10. The pain is moderate. The pain is the same all the time. The symptoms are aggravated by sitting. Stiffness is present all day. Pertinent negatives include no abdominal pain, bladder incontinence, bowel incontinence, dysuria or numbness. She has tried nothing for the symptoms. The treatment provided no relief.       Constitution: Negative for fatigue.   Gastrointestinal: Negative for abdominal pain and bowel incontinence.   Genitourinary: Negative for dysuria, urgency, bladder incontinence and hematuria.   Musculoskeletal: Positive for pain, arthritis, back pain and muscle ache. Negative for history of spine disorder.   Skin: Negative for rash.   Neurological: Negative for coordination disturbances, numbness and tingling.       Objective:      Physical Exam   Constitutional: She appears well-developed and well-nourished.   HENT:   Head: Normocephalic and atraumatic.   Neck: Normal range of motion. Neck supple.   Musculoskeletal:    rSI joint tender with pain radiating to buttock and ant thigh   Neurological:   Motor and sensory intact lower extremities   Nursing note and vitals reviewed.      Assessment:       1. Sacroiliitis    2. Smoker        Plan:         Sacroiliitis  -     ketorolac injection 60 mg  -     methylPREDNISolone (MEDROL DOSEPACK) 4 mg tablet; use as directed  Dispense: 1 Package; " Refill: 0  -     HYDROcodone-acetaminophen (NORCO) 7.5-325 mg per tablet; Take 1 tablet by mouth every 6 (six) hours as needed for Pain.  Dispense: 12 tablet; Refill: 0    Smoker  -     Ambulatory referral to Smoking Cessation Program

## 2018-12-18 ENCOUNTER — HOSPITAL ENCOUNTER (INPATIENT)
Facility: OTHER | Age: 63
LOS: 3 days | Discharge: NURSING, REHAB OR LONG-TERM CARE | DRG: 065 | End: 2018-12-21
Attending: EMERGENCY MEDICINE | Admitting: HOSPITALIST
Payer: MEDICARE

## 2018-12-18 DIAGNOSIS — K21.9 GASTROESOPHAGEAL REFLUX DISEASE WITHOUT ESOPHAGITIS: ICD-10-CM

## 2018-12-18 DIAGNOSIS — I67.9: Primary | ICD-10-CM

## 2018-12-18 DIAGNOSIS — R11.2 NAUSEA AND VOMITING, INTRACTABILITY OF VOMITING NOT SPECIFIED, UNSPECIFIED VOMITING TYPE: ICD-10-CM

## 2018-12-18 DIAGNOSIS — R53.1 WEAKNESS: ICD-10-CM

## 2018-12-18 DIAGNOSIS — R27.0 ATAXIA: ICD-10-CM

## 2018-12-18 DIAGNOSIS — G45.9 TIA (TRANSIENT ISCHEMIC ATTACK): ICD-10-CM

## 2018-12-18 DIAGNOSIS — I63.332 CEREBROVASCULAR ACCIDENT (CVA) DUE TO THROMBOSIS OF LEFT POSTERIOR CEREBRAL ARTERY: ICD-10-CM

## 2018-12-18 DIAGNOSIS — E11.9 TYPE 2 DIABETES MELLITUS WITHOUT COMPLICATION, WITHOUT LONG-TERM CURRENT USE OF INSULIN: ICD-10-CM

## 2018-12-18 DIAGNOSIS — G46.7: Primary | ICD-10-CM

## 2018-12-18 DIAGNOSIS — Z72.0 TOBACCO ABUSE: ICD-10-CM

## 2018-12-18 DIAGNOSIS — I10 ESSENTIAL HYPERTENSION: ICD-10-CM

## 2018-12-18 DIAGNOSIS — E78.00 PURE HYPERCHOLESTEROLEMIA: ICD-10-CM

## 2018-12-18 DIAGNOSIS — I63.9 STROKE: ICD-10-CM

## 2018-12-18 DIAGNOSIS — Z86.73 HISTORY OF TIAS: ICD-10-CM

## 2018-12-18 LAB
ALBUMIN SERPL BCP-MCNC: 3.7 G/DL
ALP SERPL-CCNC: 75 U/L
ALT SERPL W/O P-5'-P-CCNC: 66 U/L
ANION GAP SERPL CALC-SCNC: 10 MMOL/L
AST SERPL-CCNC: 44 U/L
BASOPHILS # BLD AUTO: 0.02 K/UL
BASOPHILS NFR BLD: 0.3 %
BILIRUB SERPL-MCNC: 0.8 MG/DL
BILIRUB UR QL STRIP: NEGATIVE
BUN SERPL-MCNC: 9 MG/DL
CALCIUM SERPL-MCNC: 9.9 MG/DL
CHLORIDE SERPL-SCNC: 103 MMOL/L
CLARITY UR: CLEAR
CO2 SERPL-SCNC: 26 MMOL/L
COLOR UR: YELLOW
CREAT SERPL-MCNC: 0.8 MG/DL
DIFFERENTIAL METHOD: ABNORMAL
EOSINOPHIL # BLD AUTO: 0 K/UL
EOSINOPHIL NFR BLD: 0.4 %
ERYTHROCYTE [DISTWIDTH] IN BLOOD BY AUTOMATED COUNT: 11.7 %
EST. GFR  (AFRICAN AMERICAN): >60 ML/MIN/1.73 M^2
EST. GFR  (NON AFRICAN AMERICAN): >60 ML/MIN/1.73 M^2
GLUCOSE SERPL-MCNC: 136 MG/DL
GLUCOSE UR QL STRIP: NEGATIVE
HCT VFR BLD AUTO: 44.2 %
HGB BLD-MCNC: 15.2 G/DL
HGB UR QL STRIP: NEGATIVE
KETONES UR QL STRIP: ABNORMAL
LEUKOCYTE ESTERASE UR QL STRIP: NEGATIVE
LYMPHOCYTES # BLD AUTO: 2.2 K/UL
LYMPHOCYTES NFR BLD: 28.1 %
MCH RBC QN AUTO: 32.3 PG
MCHC RBC AUTO-ENTMCNC: 34.4 G/DL
MCV RBC AUTO: 94 FL
MONOCYTES # BLD AUTO: 0.5 K/UL
MONOCYTES NFR BLD: 5.9 %
NEUTROPHILS # BLD AUTO: 5.1 K/UL
NEUTROPHILS NFR BLD: 65.2 %
NITRITE UR QL STRIP: NEGATIVE
PH UR STRIP: 8 [PH] (ref 5–8)
PLATELET # BLD AUTO: 246 K/UL
PMV BLD AUTO: 9.8 FL
POCT GLUCOSE: 126 MG/DL (ref 70–110)
POTASSIUM SERPL-SCNC: 3.6 MMOL/L
PROT SERPL-MCNC: 8 G/DL
PROT UR QL STRIP: NEGATIVE
RBC # BLD AUTO: 4.71 M/UL
SODIUM SERPL-SCNC: 139 MMOL/L
SP GR UR STRIP: 1.01 (ref 1–1.03)
URN SPEC COLLECT METH UR: ABNORMAL
UROBILINOGEN UR STRIP-ACNC: 1 EU/DL
WBC # BLD AUTO: 7.86 K/UL

## 2018-12-18 PROCEDURE — 93005 ELECTROCARDIOGRAM TRACING: CPT

## 2018-12-18 PROCEDURE — 36415 COLL VENOUS BLD VENIPUNCTURE: CPT

## 2018-12-18 PROCEDURE — 96361 HYDRATE IV INFUSION ADD-ON: CPT

## 2018-12-18 PROCEDURE — 81003 URINALYSIS AUTO W/O SCOPE: CPT

## 2018-12-18 PROCEDURE — 11000001 HC ACUTE MED/SURG PRIVATE ROOM

## 2018-12-18 PROCEDURE — 25000003 PHARM REV CODE 250: Performed by: EMERGENCY MEDICINE

## 2018-12-18 PROCEDURE — 96374 THER/PROPH/DIAG INJ IV PUSH: CPT

## 2018-12-18 PROCEDURE — G0378 HOSPITAL OBSERVATION PER HR: HCPCS

## 2018-12-18 PROCEDURE — 85025 COMPLETE CBC W/AUTO DIFF WBC: CPT

## 2018-12-18 PROCEDURE — 99285 EMERGENCY DEPT VISIT HI MDM: CPT | Mod: 25

## 2018-12-18 PROCEDURE — 80053 COMPREHEN METABOLIC PANEL: CPT

## 2018-12-18 PROCEDURE — 93010 ELECTROCARDIOGRAM REPORT: CPT | Mod: ,,, | Performed by: INTERNAL MEDICINE

## 2018-12-18 PROCEDURE — 99220 PR INITIAL OBSERVATION CARE,LEVL III: CPT | Mod: ,,, | Performed by: NURSE PRACTITIONER

## 2018-12-18 PROCEDURE — 83036 HEMOGLOBIN GLYCOSYLATED A1C: CPT

## 2018-12-18 PROCEDURE — 63600175 PHARM REV CODE 636 W HCPCS: Performed by: NURSE PRACTITIONER

## 2018-12-18 PROCEDURE — 63600175 PHARM REV CODE 636 W HCPCS: Performed by: EMERGENCY MEDICINE

## 2018-12-18 PROCEDURE — 96375 TX/PRO/DX INJ NEW DRUG ADDON: CPT

## 2018-12-18 RX ORDER — ONDANSETRON 8 MG/1
8 TABLET, ORALLY DISINTEGRATING ORAL EVERY 8 HOURS PRN
Status: DISCONTINUED | OUTPATIENT
Start: 2018-12-18 | End: 2018-12-21 | Stop reason: HOSPADM

## 2018-12-18 RX ORDER — ASPIRIN 325 MG
325 TABLET ORAL DAILY
Status: DISCONTINUED | OUTPATIENT
Start: 2018-12-19 | End: 2018-12-20

## 2018-12-18 RX ORDER — GLUCAGON 1 MG
1 KIT INJECTION
Status: DISCONTINUED | OUTPATIENT
Start: 2018-12-18 | End: 2018-12-21 | Stop reason: HOSPADM

## 2018-12-18 RX ORDER — ACETAMINOPHEN 500 MG
1000 TABLET ORAL
Status: COMPLETED | OUTPATIENT
Start: 2018-12-18 | End: 2018-12-18

## 2018-12-18 RX ORDER — HEPARIN SODIUM 5000 [USP'U]/ML
5000 INJECTION, SOLUTION INTRAVENOUS; SUBCUTANEOUS EVERY 8 HOURS
Status: DISCONTINUED | OUTPATIENT
Start: 2018-12-18 | End: 2018-12-21 | Stop reason: HOSPADM

## 2018-12-18 RX ORDER — SODIUM CHLORIDE 0.9 % (FLUSH) 0.9 %
5 SYRINGE (ML) INJECTION
Status: DISCONTINUED | OUTPATIENT
Start: 2018-12-18 | End: 2018-12-21 | Stop reason: HOSPADM

## 2018-12-18 RX ORDER — IBUPROFEN 200 MG
24 TABLET ORAL
Status: DISCONTINUED | OUTPATIENT
Start: 2018-12-18 | End: 2018-12-21 | Stop reason: HOSPADM

## 2018-12-18 RX ORDER — ONDANSETRON 2 MG/ML
4 INJECTION INTRAMUSCULAR; INTRAVENOUS
Status: COMPLETED | OUTPATIENT
Start: 2018-12-18 | End: 2018-12-18

## 2018-12-18 RX ORDER — LABETALOL HYDROCHLORIDE 5 MG/ML
20 INJECTION, SOLUTION INTRAVENOUS
Status: DISPENSED | OUTPATIENT
Start: 2018-12-18 | End: 2018-12-19

## 2018-12-18 RX ORDER — ACETAMINOPHEN 325 MG/1
650 TABLET ORAL EVERY 4 HOURS PRN
Status: DISCONTINUED | OUTPATIENT
Start: 2018-12-18 | End: 2018-12-21 | Stop reason: HOSPADM

## 2018-12-18 RX ORDER — PRAVASTATIN SODIUM 20 MG/1
80 TABLET ORAL DAILY
Status: DISCONTINUED | OUTPATIENT
Start: 2018-12-19 | End: 2018-12-19

## 2018-12-18 RX ORDER — HYDRALAZINE HYDROCHLORIDE 20 MG/ML
10 INJECTION INTRAMUSCULAR; INTRAVENOUS
Status: COMPLETED | OUTPATIENT
Start: 2018-12-18 | End: 2018-12-18

## 2018-12-18 RX ORDER — IBUPROFEN 200 MG
16 TABLET ORAL
Status: DISCONTINUED | OUTPATIENT
Start: 2018-12-18 | End: 2018-12-21 | Stop reason: HOSPADM

## 2018-12-18 RX ORDER — INSULIN ASPART 100 [IU]/ML
0-5 INJECTION, SOLUTION INTRAVENOUS; SUBCUTANEOUS
Status: DISCONTINUED | OUTPATIENT
Start: 2018-12-18 | End: 2018-12-21 | Stop reason: HOSPADM

## 2018-12-18 RX ADMIN — PROMETHAZINE HYDROCHLORIDE 12.5 MG: 25 INJECTION INTRAMUSCULAR; INTRAVENOUS at 08:12

## 2018-12-18 RX ADMIN — HYDRALAZINE HYDROCHLORIDE 10 MG: 20 INJECTION INTRAMUSCULAR; INTRAVENOUS at 07:12

## 2018-12-18 RX ADMIN — ACETAMINOPHEN 1000 MG: 500 TABLET, FILM COATED ORAL at 08:12

## 2018-12-18 RX ADMIN — HEPARIN SODIUM 5000 UNITS: 5000 INJECTION, SOLUTION INTRAVENOUS; SUBCUTANEOUS at 10:12

## 2018-12-18 RX ADMIN — SODIUM CHLORIDE 1000 ML: 0.9 INJECTION, SOLUTION INTRAVENOUS at 07:12

## 2018-12-18 RX ADMIN — ONDANSETRON 4 MG: 2 INJECTION INTRAMUSCULAR; INTRAVENOUS at 07:12

## 2018-12-19 PROBLEM — N39.41 URGE INCONTINENCE: Status: ACTIVE | Noted: 2017-02-16

## 2018-12-19 PROBLEM — R30.0 DYSURIA: Status: ACTIVE | Noted: 2017-02-16

## 2018-12-19 PROBLEM — N39.0 RECURRENT UTI (URINARY TRACT INFECTION): Status: ACTIVE | Noted: 2017-07-14

## 2018-12-19 PROBLEM — N94.19 DYSPAREUNIA DUE TO MEDICAL CONDITION IN FEMALE: Status: ACTIVE | Noted: 2017-02-16

## 2018-12-19 PROBLEM — Z01.419 WELL WOMAN EXAM WITH ROUTINE GYNECOLOGICAL EXAM: Status: ACTIVE | Noted: 2017-02-16

## 2018-12-19 PROBLEM — N30.10 INTERSTITIAL CYSTITIS (CHRONIC) WITHOUT HEMATURIA: Status: ACTIVE | Noted: 2017-02-16

## 2018-12-19 PROBLEM — I63.9 CEREBROVASCULAR ACCIDENT (CVA) DUE TO THROMBOSIS: Status: ACTIVE | Noted: 2018-12-18

## 2018-12-19 PROBLEM — I63.9 STROKE: Status: ACTIVE | Noted: 2018-12-19

## 2018-12-19 LAB
ALBUMIN SERPL BCP-MCNC: 3.4 G/DL
ALP SERPL-CCNC: 74 U/L
ALT SERPL W/O P-5'-P-CCNC: 56 U/L
ANION GAP SERPL CALC-SCNC: 10 MMOL/L
AORTIC ROOT ANNULUS: 2.93 CM
AORTIC VALVE CUSP SEPERATION: 1.59 CM
ASCENDING AORTA: 3.07 CM
AST SERPL-CCNC: 36 U/L
AV INDEX (PROSTH): 0.71
AV MEAN GRADIENT: 5.27 MMHG
AV PEAK GRADIENT: 10.24 MMHG
AV VALVE AREA: 2.15 CM2
BASOPHILS # BLD AUTO: 0.03 K/UL
BASOPHILS NFR BLD: 0.3 %
BILIRUB SERPL-MCNC: 1 MG/DL
BSA FOR ECHO PROCEDURE: 2.03 M2
BUN SERPL-MCNC: 10 MG/DL
CALCIUM SERPL-MCNC: 9.8 MG/DL
CHLORIDE SERPL-SCNC: 106 MMOL/L
CHOLEST SERPL-MCNC: 208 MG/DL
CHOLEST/HDLC SERPL: 3.9 {RATIO}
CO2 SERPL-SCNC: 23 MMOL/L
CREAT SERPL-MCNC: 0.8 MG/DL
CV ECHO LV RWT: 0.42 CM
DIFFERENTIAL METHOD: ABNORMAL
DOP CALC AO PEAK VEL: 1.6 M/S
DOP CALC AO VTI: 33.22 CM
DOP CALC LVOT AREA: 3.02 CM2
DOP CALC LVOT DIAMETER: 1.96 CM
DOP CALC LVOT STROKE VOLUME: 71.53 CM3
DOP CALCLVOT PEAK VEL VTI: 23.72 CM
E WAVE DECELERATION TIME: 185.65 MSEC
E/A RATIO: 0.89
E/E' RATIO: 9.47
ECHO LV POSTERIOR WALL: 0.92 CM (ref 0.6–1.1)
EOSINOPHIL # BLD AUTO: 0.1 K/UL
EOSINOPHIL NFR BLD: 1 %
ERYTHROCYTE [DISTWIDTH] IN BLOOD BY AUTOMATED COUNT: 11.8 %
EST. GFR  (AFRICAN AMERICAN): >60 ML/MIN/1.73 M^2
EST. GFR  (NON AFRICAN AMERICAN): >60 ML/MIN/1.73 M^2
ESTIMATED AVG GLUCOSE: 120 MG/DL
FRACTIONAL SHORTENING: 31 % (ref 28–44)
GLUCOSE SERPL-MCNC: 107 MG/DL
HBA1C MFR BLD HPLC: 5.8 %
HCT VFR BLD AUTO: 39.5 %
HDLC SERPL-MCNC: 54 MG/DL
HDLC SERPL: 26 %
HGB BLD-MCNC: 13.5 G/DL
INTERVENTRICULAR SEPTUM: 0.92 CM (ref 0.6–1.1)
IVRT: 0.1 MSEC
LA MAJOR: 5.29 CM
LA MINOR: 5.35 CM
LA WIDTH: 4.13 CM
LDLC SERPL CALC-MCNC: 133.8 MG/DL
LEFT ATRIUM SIZE: 3.54 CM
LEFT ATRIUM VOLUME INDEX: 33 ML/M2
LEFT ATRIUM VOLUME: 66.11 CM3
LEFT INTERNAL DIMENSION IN SYSTOLE: 3.03 CM (ref 2.1–4)
LEFT VENTRICLE DIASTOLIC VOLUME INDEX: 43.52 ML/M2
LEFT VENTRICLE DIASTOLIC VOLUME: 87.16 ML
LEFT VENTRICLE MASS INDEX: 65.6 G/M2
LEFT VENTRICLE SYSTOLIC VOLUME INDEX: 17.9 ML/M2
LEFT VENTRICLE SYSTOLIC VOLUME: 35.85 ML
LEFT VENTRICULAR INTERNAL DIMENSION IN DIASTOLE: 4.39 CM (ref 3.5–6)
LEFT VENTRICULAR MASS: 131.39 G
LV LATERAL E/E' RATIO: 8.18
LV SEPTAL E/E' RATIO: 11.25
LYMPHOCYTES # BLD AUTO: 4.4 K/UL
LYMPHOCYTES NFR BLD: 43.7 %
MAGNESIUM SERPL-MCNC: 2.2 MG/DL
MCH RBC QN AUTO: 32.3 PG
MCHC RBC AUTO-ENTMCNC: 34.2 G/DL
MCV RBC AUTO: 95 FL
MONOCYTES # BLD AUTO: 0.6 K/UL
MONOCYTES NFR BLD: 6.2 %
MV PEAK A VEL: 1.01 M/S
MV PEAK E VEL: 0.9 M/S
NEUTROPHILS # BLD AUTO: 4.9 K/UL
NEUTROPHILS NFR BLD: 48.4 %
NONHDLC SERPL-MCNC: 154 MG/DL
PHOSPHATE SERPL-MCNC: 3.6 MG/DL
PLATELET # BLD AUTO: 234 K/UL
PMV BLD AUTO: 9.6 FL
POCT GLUCOSE: 105 MG/DL (ref 70–110)
POCT GLUCOSE: 130 MG/DL (ref 70–110)
POCT GLUCOSE: 147 MG/DL (ref 70–110)
POCT GLUCOSE: 172 MG/DL (ref 70–110)
POTASSIUM SERPL-SCNC: 3.3 MMOL/L
PROT SERPL-MCNC: 7.3 G/DL
PULM VEIN S/D RATIO: 0.63
PV PEAK D VEL: 0.79 M/S
PV PEAK S VEL: 0.5 M/S
PV PEAK VELOCITY: 0.96 CM/S
RA MAJOR: 4.31 CM
RA PRESSURE: 3 MMHG
RA WIDTH: 3.76 CM
RBC # BLD AUTO: 4.18 M/UL
RIGHT VENTRICULAR END-DIASTOLIC DIMENSION: 3.7 CM
RV TISSUE DOPPLER FREE WALL SYSTOLIC VELOCITY 1 (APICAL 4 CHAMBER VIEW): 12.54 M/S
SINUS: 2.73 CM
SODIUM SERPL-SCNC: 139 MMOL/L
STJ: 2.38 CM
TDI LATERAL: 0.11
TDI SEPTAL: 0.08
TDI: 0.1
TRICUSPID ANNULAR PLANE SYSTOLIC EXCURSION: 2.54 CM
TRIGL SERPL-MCNC: 101 MG/DL
WBC # BLD AUTO: 10.17 K/UL

## 2018-12-19 PROCEDURE — 36415 COLL VENOUS BLD VENIPUNCTURE: CPT

## 2018-12-19 PROCEDURE — A9585 GADOBUTROL INJECTION: HCPCS | Performed by: HOSPITALIST

## 2018-12-19 PROCEDURE — 25500020 PHARM REV CODE 255: Performed by: HOSPITALIST

## 2018-12-19 PROCEDURE — 83735 ASSAY OF MAGNESIUM: CPT

## 2018-12-19 PROCEDURE — 99233 SBSQ HOSP IP/OBS HIGH 50: CPT | Mod: ,,, | Performed by: NURSE PRACTITIONER

## 2018-12-19 PROCEDURE — 92610 EVALUATE SWALLOWING FUNCTION: CPT

## 2018-12-19 PROCEDURE — 85025 COMPLETE CBC W/AUTO DIFF WBC: CPT

## 2018-12-19 PROCEDURE — 80061 LIPID PANEL: CPT

## 2018-12-19 PROCEDURE — 97112 NEUROMUSCULAR REEDUCATION: CPT

## 2018-12-19 PROCEDURE — 94761 N-INVAS EAR/PLS OXIMETRY MLT: CPT

## 2018-12-19 PROCEDURE — 97165 OT EVAL LOW COMPLEX 30 MIN: CPT

## 2018-12-19 PROCEDURE — 84100 ASSAY OF PHOSPHORUS: CPT

## 2018-12-19 PROCEDURE — 25000003 PHARM REV CODE 250: Performed by: NURSE PRACTITIONER

## 2018-12-19 PROCEDURE — 99222 1ST HOSP IP/OBS MODERATE 55: CPT | Mod: ,,, | Performed by: PSYCHIATRY & NEUROLOGY

## 2018-12-19 PROCEDURE — 63600175 PHARM REV CODE 636 W HCPCS: Performed by: NURSE PRACTITIONER

## 2018-12-19 PROCEDURE — 92523 SPEECH SOUND LANG COMPREHEN: CPT

## 2018-12-19 PROCEDURE — 11000001 HC ACUTE MED/SURG PRIVATE ROOM

## 2018-12-19 PROCEDURE — 80053 COMPREHEN METABOLIC PANEL: CPT

## 2018-12-19 RX ORDER — AMLODIPINE BESYLATE 5 MG/1
5 TABLET ORAL
Status: ON HOLD | COMMUNITY
End: 2018-12-20

## 2018-12-19 RX ORDER — GADOBUTROL 604.72 MG/ML
10 INJECTION INTRAVENOUS
Status: COMPLETED | OUTPATIENT
Start: 2018-12-19 | End: 2018-12-19

## 2018-12-19 RX ORDER — HYDROCHLOROTHIAZIDE 25 MG/1
25 TABLET ORAL
Status: ON HOLD | COMMUNITY
End: 2018-12-20

## 2018-12-19 RX ORDER — ONDANSETRON 8 MG/1
8 TABLET, ORALLY DISINTEGRATING ORAL
Status: ON HOLD | COMMUNITY
Start: 2018-12-18 | End: 2018-12-20 | Stop reason: HOSPADM

## 2018-12-19 RX ORDER — ATORVASTATIN CALCIUM 20 MG/1
80 TABLET, FILM COATED ORAL DAILY
Status: DISCONTINUED | OUTPATIENT
Start: 2018-12-20 | End: 2018-12-19

## 2018-12-19 RX ORDER — TRAZODONE HYDROCHLORIDE 50 MG/1
50 TABLET ORAL NIGHTLY
Status: DISCONTINUED | OUTPATIENT
Start: 2018-12-19 | End: 2018-12-21 | Stop reason: HOSPADM

## 2018-12-19 RX ORDER — ATORVASTATIN CALCIUM 20 MG/1
80 TABLET, FILM COATED ORAL DAILY
Status: DISCONTINUED | OUTPATIENT
Start: 2018-12-19 | End: 2018-12-21 | Stop reason: HOSPADM

## 2018-12-19 RX ADMIN — TRAZODONE HYDROCHLORIDE 50 MG: 50 TABLET ORAL at 12:12

## 2018-12-19 RX ADMIN — HEPARIN SODIUM 5000 UNITS: 5000 INJECTION, SOLUTION INTRAVENOUS; SUBCUTANEOUS at 09:12

## 2018-12-19 RX ADMIN — ATORVASTATIN CALCIUM 80 MG: 20 TABLET, FILM COATED ORAL at 11:12

## 2018-12-19 RX ADMIN — TRAZODONE HYDROCHLORIDE 50 MG: 50 TABLET ORAL at 08:12

## 2018-12-19 RX ADMIN — GADOBUTROL 10 ML: 604.72 INJECTION INTRAVENOUS at 08:12

## 2018-12-19 RX ADMIN — HEPARIN SODIUM 5000 UNITS: 5000 INJECTION, SOLUTION INTRAVENOUS; SUBCUTANEOUS at 05:12

## 2018-12-19 RX ADMIN — HEPARIN SODIUM 5000 UNITS: 5000 INJECTION, SOLUTION INTRAVENOUS; SUBCUTANEOUS at 02:12

## 2018-12-19 RX ADMIN — ASPIRIN 325 MG ORAL TABLET 325 MG: 325 PILL ORAL at 12:12

## 2018-12-19 NOTE — PT/OT/SLP EVAL
Physical Therapy Evaluation    Patient Name:  Kaila Scott   MRN:  [id number]    Recommendations:     Discharge Recommendations:  rehabilitation facility   Discharge Equipment Recommendations: (Defer to IPR)   Barriers to discharge: Decreased caregiver support and decline in functional independent mobility     Assessment:     Kaila Scott is a 63 y.o. female admitted with a medical diagnosis of Cerebrovascular accident (CVA) due to thrombosis.  She presents with the following impairments/functional limitations:  weakness, impaired endurance, impaired self care skills, impaired balance, gait instability, impaired functional mobilty, decreased lower extremity function, decreased ROM, pain, decreased safety awareness, impaired coordination, impaired fine motor. Pt demonstrates a significant decline in functional mobility compared to baseline. Pt reports, at baseline pt was independent, working full-time, exercising daily and was compliant with all medication. Currently pt tolerated ~15 seconds of standing, pt also requires physical assistance for OOB activity. At the time of evaluation pt was unable to safely complete ambulation. Despite patient's co-morbidities, including arthritis, back pain with sciatica, DM and HTM as well as a past medical history including stroke, patient was living in community setting functioning at an independent level for ADLs, and mobility prior to this event.  There is an expectation of returning to prior level of function to maintain independence thus avoiding readmission.  Patient's clinical condition meets full Inpatient Rehab (IPR) criteria; including the ability to actively participate in 3 hours of therapy.  A lower level of care(SNF) cannot provide the interdisciplinary treatment approach needed.       Rehab Prognosis: Good; patient would benefit from acute skilled PT services to address these deficits and reach maximum level of function.    Recent Surgery: * No surgery found *   "    Plan:     During this hospitalization, patient to be seen daily to address the identified rehab impairments via gait training, therapeutic activities, neuromuscular re-education and progress toward the following goals:    GOALS:   Multidisciplinary Problems     Physical Therapy Goals        Problem: Physical Therapy Goal    Goal Priority Disciplines Outcome Goal Variances Interventions   Physical Therapy Goal     PT, PT/OT Ongoing (interventions implemented as appropriate)     Description:  Goals to be met by:      Patient will increase functional independence with mobility by performin) Supine <>stand Mod I  2) Sit <>Stand with Supervision   3) Pt to ambulate at least 50 feet with Mod A with AD  4) Pt with maintain midline position during sitting and during transfers.   5) Pt will maintain standing with Min A.   6) Bed <>Chair with Supervision.                      · Plan of Care Expires:  19    Subjective     Chief Complaint: Decline in functional mobility. Pt referenced her PLOF and fear of not returning to this level multiple times during evaluation/treatment session.  Patient/Family Comments/goals: to return to PLOF.   Pain/Comfort:  · Pain Rating 1: 9/10(at rest )  · Location - Orientation 1: generalized  · Location 1: head  · Pain Addressed 1: Cessation of Activity, Reposition  · Pain Rating Post-Intervention 1: 9/10(pain with bed mobility)    Patients cultural, spiritual, Mandaeism conflicts given the current situation: no    Living Environment:  Pt lives alone in a 2 story apartment, able to stay on the first floor, with NSTE.   Prior to admission, patients level of function was independent with mobility and ADLs including driving and working full time as a care taker for "an elderly latdy.".  Equipment used at home: none.  DME owned (not currently used): none.  Upon discharge, patient will have limited assistance from her daughter. Her daughter works 2 jobs and would be able to " call and check on her; however, pt's daughter with decreased ability to physically assist and be with mother 2* to work.    Objective:     Communicated with SRIDEVI Kerns prior to session.  Patient found supine with HOB elevated peripheral IV, telemetry  upon PT entry to room.    General Precautions: Standard, fall, diabetic   Orthopedic Precautions:N/A   Braces: N/A     Exams:  · Cognitive Exam:  Patient is oriented to Person, Place, Time and Situation  · Fine Motor Coordination:    · -       Impaired  Right hand, diadochokinesis skill impared compared to L hand,. , RLE heel shin intact and LLE heel shin intact  · Gross Motor Coordination:  impaired  · Postural Exam:  Patient presented with the following abnormalities:    · -       pt with R lateral cervical and trunk flexion  · Sensation:    · -       Intact  light/touch BLE  · RLE ROM: WFL  · RLE Strength: WFL  · LLE ROM: WFL  · LLE Strength: WFL    Functional Mobility:  · Bed Mobility:     · Scooting: supervision  · Supine to Sit: supervision  · Sit to Supine: supervision  · Transfers:     · Sit to Stand:  minimum assistance with no AD  · Gait: Unable to assess 2* to safety concers as pt with poor tolerance to standing.   · Balance: Static sittng: Fair, pt demonstrates R lateral tunk lean and cervical flexion. Static standing fair -      Therapeutic Activities and Exercises:  · Orthostatics were taken and are as follows:   · Supine: 173/92   · Sitting EOB: 172/91  · Sitting after standing (Unable to assess while standing 2* to decreased standing tolerance) 172/93  · Pt required Min A for balance during sit to stand transfer.   · PT Educated pt on the role of PT, POC and importance of safety. Pt expressed the want to shower multiple times during evaluation/treatment. PT explained and pt verbalized understanding at this time pt is unsafe to ambulate to the bathroom 2* to poor standing tolerance.     AM-PAC 6 CLICK MOBILITY  Total Score:16     Patient left HOB elevated  with all lines intact, call button in reach, bed alarm on and RN and PCT\ notified.    GOALS:   Multidisciplinary Problems     Physical Therapy Goals        Problem: Physical Therapy Goal    Goal Priority Disciplines Outcome Goal Variances Interventions   Physical Therapy Goal     PT, PT/OT Ongoing (interventions implemented as appropriate)     Description:  Goals to be met by: -     Patient will increase functional independence with mobility by performin) Supine <>stand Mod I  2) Sit <>Stand with Supervision   3) Pt to ambulate at least 50 feet with Mod A with AD  4) Pt with maintain midline position during sitting and during transfers.   5) Pt will maintain standing with Min A.   6) Bed <>Chair with Supervision.                      History:     Past Medical History:   Diagnosis Date    Arthritis     Back pain with sciatica     Diabetes mellitus     Hypertension     Stroke        History reviewed. No pertinent surgical history.    Clinical Decision Making:     History  Co-morbidities and personal factors that may impact the plan of care Examination  Body Structures and Functions, activity limitations and participation restrictions that may impact the plan of care Clinical Presentation   Decision Making/ Complexity Score   Co-morbidities:   [] Time since onset of injury / illness / exacerbation  [] Status of current condition  []Patient's cognitive status and safety concerns    [] Multiple Medical Problems (see med hx)  Personal Factors:   [] Patient's age  [] Prior Level of function   [] Patient's home situation (environment and family support)  [] Patient's level of motivation  [] Expected progression of patient      HISTORY:(criteria)    [] 72782 - no personal factors/history    [] 91891 - has 1-2 personal factor/comorbidity     [] 23458 - has >3 personal factor/comorbidity     Body Regions:  [] Objective examination findings  [] Head     []  Neck  [] Trunk   [] Upper Extremity  [] Lower  Extremity    Body Systems:  [] For communication ability, affect, cognition, language, and learning style: the assessment of the ability to make needs known, consciousness, orientation (person, place, and time), expected emotional /behavioral responses, and learning preferences (eg, learning barriers, education  needs)  [] For the neuromuscular system: a general assessment of gross coordinated movement (eg, balance, gait, locomotion, transfers, and transitions) and motor function  (motor control and motor learning)  [] For the musculoskeletal system: the assessment of gross symmetry, gross range of motion, gross strength, height, and weight  [] For the integumentary system: the assessment of pliability(texture), presence of scar formation, skin color, and skin integrity  [] For cardiovascular/pulmonary system: the assessment of heart rate, respiratory rate, blood pressure, and edema     Activity limitations:    [] Patient's cognitive status and saf ety concerns          [] Status of current condition      [] Weight bearing restriction  [] Cardiopulmunary Restriction    Participation Restrictions:   [] Goals and goal agreement with the patient     [] Rehab potential (prognosis) and probable outcome      Examination of Body System: (criteria)    [] 29771 - addressing 1-2 elements    [] 03569 - addressing a total of 3 or more elements     [] 75622 -  Addressing a total of 4 or more elements         Clinical Presentation: (criteria)  Choose one     On examination of body system using standardized tests and measures patient presents with (CHOOSE ONE) elements from any of the following: body structures and functions, activity limitations, and/or participation restrictions.  Leading to a clinical presentation that is considered (CHOOSE ONE)                              Clinical Decision Making  (Eval Complexity):  Choose One     Time Tracking:     PT Received On: 12/19/18  PT Start Time: 0935     PT Stop Time: 1005  PT  Total Time (min): 30 min with OT    Billable Minutes: Evaluation 10 and Therapeutic Activity 10      Timi Sarabia PT. [email or web address]  12/19/2018

## 2018-12-19 NOTE — PLAN OF CARE
Problem: Physical Therapy Goal  Goal: Physical Therapy Goal  Goals to be met by: -     Patient will increase functional independence with mobility by performin) Supine <>stand Mod I  2) Sit <>Stand with Supervision   3) Pt to ambulate at least 50 feet with Mod A with AD  4) Pt with maintain midline position during sitting and during transfers.   5) Pt will maintain standing with Min A.   6) Bed <>Chair with Supervision.    Outcome: Ongoing (interventions implemented as appropriate)  PT orders received, initial evaluation complete PT to follow.

## 2018-12-19 NOTE — HOSPITAL COURSE
Since admission the patient does report some improvement overall functioning and she is able to ambulate without assistance the still has a tendency to lean to the right and is somewhat unsteady.  She denies any headaches or visual difficulties.  Results of the MRI scan were reviewed with the patient.

## 2018-12-19 NOTE — SUBJECTIVE & OBJECTIVE
Interval History:  Continues with gait disturbance and intermittent double vision; MRI with acute left parietal infarct; MRA negative for acute stenosis or occlusion; Neurology consult pending; PT/OT; secondary stroke prevention and permissive hypertension for now    Review of Systems   Constitutional: Positive for activity change and fatigue. Negative for appetite change, chills and fever.   HENT: Negative for congestion and trouble swallowing.    Eyes: Positive for visual disturbance (double vision).   Respiratory: Negative for chest tightness and shortness of breath.    Cardiovascular: Negative for chest pain, palpitations and leg swelling.   Gastrointestinal: Negative for abdominal pain, diarrhea, nausea and vomiting.   Endocrine: Negative.    Genitourinary: Negative for flank pain, frequency, hematuria and urgency.   Musculoskeletal: Positive for gait problem (falling to right side; difficulty walking).   Skin: Negative.    Neurological: Positive for dizziness, weakness, light-headedness and numbness (right lower leg). Negative for seizures, speech difficulty and headaches.   Hematological: Does not bruise/bleed easily.   Psychiatric/Behavioral: Negative.      Objective:     Vital Signs (Most Recent):  Temp: 98.6 °F (37 °C) (12/19/18 1204)  Pulse: 65 (12/19/18 1204)  Resp: 18 (12/19/18 1204)  BP: (!) 182/86 (12/19/18 1204)  SpO2: 95 % (12/19/18 1204) Vital Signs (24h Range):  Temp:  [98.2 °F (36.8 °C)-98.6 °F (37 °C)] 98.6 °F (37 °C)  Pulse:  [60-80] 65  Resp:  [15-20] 18  SpO2:  [93 %-96 %] 95 %  BP: (140-186)/(63-87) 182/86     Weight: 84.7 kg (186 lb 11.2 oz)  Body mass index is 27.57 kg/m².    Intake/Output Summary (Last 24 hours) at 12/19/2018 1439  Last data filed at 12/19/2018 0550  Gross per 24 hour   Intake 1240 ml   Output --   Net 1240 ml      Physical Exam   Constitutional: She is oriented to person, place, and time. She appears well-developed and well-nourished. No distress.   HENT:   Head:  Normocephalic and atraumatic.   Mouth/Throat: Oropharynx is clear and moist.   Eyes: Conjunctivae are normal. Pupils are equal, round, and reactive to light. Right eye exhibits no nystagmus. Left eye exhibits abnormal extraocular motion (gaze deviation to right that corrects with movement). Left eye exhibits no nystagmus.   Neck: Normal range of motion. Neck supple. No JVD present.   Cardiovascular: Normal rate, normal heart sounds and intact distal pulses. An irregular rhythm present.   No murmur heard.  Pulses:       Radial pulses are 2+ on the right side, and 2+ on the left side.        Dorsalis pedis pulses are 1+ on the right side, and 1+ on the left side.   Pulmonary/Chest: Effort normal. No respiratory distress. She has decreased breath sounds in the left lower field.   Abdominal: Soft. She exhibits no distension. Bowel sounds are decreased. There is no tenderness.   Musculoskeletal: Normal range of motion.   Neurological: She is alert and oriented to person, place, and time. She has normal strength.   Skin: Skin is warm and dry. Capillary refill takes 2 to 3 seconds.   Psychiatric: She has a normal mood and affect. Her speech is normal and behavior is normal.     Stroke Team Times:   Date and Time Taken: 2018 1:43 PM  Last Known Normal Date and Time : 201822:00    NIH Stroke Scale:  Interval: baseline (upon arrival/admit)  Level of Consciousness: 0 - alert  LOC Questions: 0 - answers both correctly  LOC Commands: 0 - performs both correctly  Best Gaze: 1 - partial gaze palsy  Visual: 0 - no visual loss  Facial Palsy: 1 - minor  Motor Left Arm: 0 - no drift  Motor Right Arm: 0 - no drift  Motor Left Le - no drift  Motor Right Le - no drift  Limb Ataxia: 1 - present in one limb  Sensory: 1 - mild to moderate loss  Best Language: 0 - no aphasia  Dysarthria: 0 - normal articulation  Extinction and Inattention: 0 - no neglect  NIH Stroke Scale Total: 4        Significant Labs:   CBC:   Recent  Labs   Lab 12/18/18 1910 12/19/18  0507   WBC 7.86 10.17   HGB 15.2 13.5   HCT 44.2 39.5    234     CMP:   Recent Labs   Lab 12/18/18 1910 12/19/18  0507    139   K 3.6 3.3*    106   CO2 26 23   * 107   BUN 9 10   CREATININE 0.8 0.8   CALCIUM 9.9 9.8   PROT 8.0 7.3   ALBUMIN 3.7 3.4*   BILITOT 0.8 1.0   ALKPHOS 75 74   AST 44* 36   ALT 66* 56*   ANIONGAP 10 10   EGFRNONAA >60 >60        All pertinent labs within the past 24 hours have been reviewed.    Significant Imaging: I have reviewed all pertinent imaging results/findings within the past 24 hours.     Imaging Results          CT Head Without Contrast (Final result)  Result time 12/18/18 20:06:14    Final result by Manjinder Alcazar MD (12/18/18 20:06:14)                 Impression:      No acute intracranial abnormalities    Stable chronic findings.      Electronically signed by: Manjinder Alcazar MD  Date:    12/18/2018  Time:    20:06             Narrative:    EXAMINATION:  CT HEAD WITHOUT CONTRAST    CLINICAL HISTORY:  Dizziness;    TECHNIQUE:  Low dose axial images were obtained through the head.  Coronal and sagittal reformations were also performed. Contrast was not administered.    COMPARISON:  None.    FINDINGS:  Remote lacunar infarcts in the right corona radiata, bilateral basal ganglia and right thalamus appears similar to prior.  There is no evidence of acute infarct, hemorrhage, or mass.  There is ventricular and sulcal enlargement consistent with mild generalized atrophy.  Moderate confluent decreased supratentorial white matter attenuation most likely related to chronic nonspecific small vessel disease.   No mass-effect or midline shift.  There are no abnormal extra-axial fluid collections.  The paranasal sinuses and mastoid air cells are essentially clear .  The calvarium appears intact.  .

## 2018-12-19 NOTE — PT/OT/SLP EVAL
Speech Language Pathology Evaluation  Bedside Swallow    Patient Name:  Kaila Scott   MRN:  [id number]  Admitting Diagnosis: Cerebrovascular accident (CVA) due to thrombosis    Recommendations:                 General Recommendations: SLP to follow up and monitor deficits s/p CVA  Diet recommendations:  Regular, Thin   Aspiration Precautions: 1 bite/sip at a time, Feed only when awake/alert, HOB to 90 degrees and Remain upright 30 minutes post meal   General Precautions: Standard,      History:        HPI: The patient is a 63 y.o. female, with a history of CVA, DM, and HTN, who presents with complaint of generalized weakness that began about 11 am this morning. Patient reports lightheadedness, nausea, and vomiting. She states she feeling unsteady and feeling like she is veering towards the right when she is walking. She reports previous stroke which she states presented with similar symptoms. She denies fever, chills, cough, abdominal pain, diarrhea, headache, speech difficulty, facial asymmetry, or numbness. She reports being seen at [facility] this morning for vomiting. She reports taking Asprin 325 mg daily. She admits to tobacco use (1 pack every 1.5 days). She denies alcohol or drug use.     MRI 12/19/18: 1. Irregularity along the the P2 and P3 segments of the posterior cerebral arteries bilaterally, likely from stenotic disease.  2. 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  3. Small caliber of the basilar artery and the intracranial vertebral arteries, likely congenital in nature.  4. No intracranial aneurysms.    Past Medical History:   Diagnosis Date    Arthritis     Back pain with sciatica     Diabetes mellitus     Hypertension     Stroke        History reviewed. No pertinent surgical history.      Subjective     · Pt awake, alert, sitting in bed watching television.   · Multiple  "interruptions during evaluation making evaluation of cognitive status limited.     Objective:     Cognitive Communication status: Pt awake, alert, agreeable to evaluation. Orientated to person, place, date, location, and reason for hospitalization. Difficulties sustaining attention to SLP with redirection required to stay on task. Difficulties selectively attending to multiple healthcare professionals within a 30 minute period. Pt demonstrating dcr awareness of impact of deficits (i.e. Does not want to be without a license because it is too dangerous to take public transportation in the city, so she would rather drive). Pt with difficulties completing a 10 minute prospective memory task this date. Pt attempted to complete task three minutes after task was given and required cues to alert to alarm when it was time to complete task. Pt able to immediately repeat 5 digits forward (norm=7) demonstrating dcr attention to task. Further evaluation of cognitive communication status warranted when pt is available to complete in a less distracting environment. Pt stating she is used to being "on her own and able to get up and go whenever she needs". Dcr insight into deficits and pt distress about not being able to function completely may be impacting safety at this time.     Oral Musculature Evaluation  · Oral Musculature: WNL  · Dentition: scattered dentition  · Secretion Management: adequate  · Mucosal Quality: good  · Oral Labial Strength and Mobility: WNL  · Lingual Strength and Mobility: WNL  · Buccal Strength and Mobility: WNL  · Voice Prior to PO Intake: clear, normal volume  · Oral Musculature Comments: Face is symmetrical at rest and during smile. Lingual and labial stregnth and ROM WNL. Able to elevate, lateralize, retract and protrude tongue at midline. Speech is 100% intelligible at the conversation level.     Bedside Swallow Eval:   Consistencies Assessed:  · Thin liquids self regulated sips via open cup  · Soft " solids 1 entire banana   · Pt stating she just ate breakfast and really doesn't have an appetite. Agreed to eat banana at bedside for evaluation.     Oral Phase:   · Lip seal, a-p transport, ability to form cohesive bolus WNL during intake of thin liquids and soft solids  · No residue noted in oral cavity after the swallow     Pharyngeal Phase:   · No overt s/s of airway threat or aspiration noted during intake of thin liquids or soft solids: no coughing, throat clearing, change in vocal quality   · Pt with no complaints of difficulties swallowing this date    Assessment:     Kaila Scott is a 63 y.o. female with no overt s/s of airway threat or aspiration noted on bedside swallow this date. Pt with difficulties attending to healthcare professionals and demonstrating dcr insight to deficits. Further evaluation of cognitive communication status required.     Attention  Current status: FCM:  LEVEL 5: The individual maintains attention within simple living activities with occasional minimal cues within distracting environments. The individual requires increased cueing to start, continue, and change attention during complex activities.    Projected status:  FCM:  LEVEL 6: The individual maintains attention within complex activities, and can attend simultaneously to multiple demands with rare minimal cues. The individual usuallyuses compensatory strategies when encountering difficulty. The individual has milddifficulty or takes more than a reasonable amount of time to attend to multiple tasks/stimuli.      Goals:   Multidisciplinary Problems     SLP Goals        Problem: SLP Goal    Goal Priority Disciplines Outcome   SLP Goal     SLP Ongoing (interventions implemented as appropriate)   Description:  1. Pt will tolerate a regular diet with thin liquids with no overt s/s of airway threat or aspiration.   2. Pt will complete a 10 minute prospective memory task given an association cue with 100% accuracy.   3. Pt will  sustain attention to task for 15 mins with no redirection required.                     Plan:     · Patient to be seen:  4 x/week, 6 x/week   · Plan of Care expires:  12/28/18  · Plan of Care reviewed with:  patient, other (see comments)(OT)   · SLP Follow-Up:  Yes       Discharge recommendations:  rehabilitation facility     Time Tracking:     SLP Treatment Date:   12/19/18  Speech Start Time:  1030  Speech Stop Time:  1056     Speech Total Time (min):  26 min    Billable Minutes: Eval 16  and Eval Swallow and Oral Function 10    Chantal Erickson CF-SLP  12/19/2018

## 2018-12-19 NOTE — ED TRIAGE NOTES
"Pt arrives to the Ed with c/o fall with incident 3 hours ago. Pt states " I went to the ER at [facility] and they diagnosed me with food poisoning and they treated me for that. On my way out I felt dizzy but I didn't think anything of it. I went home and when I got up to go to the bathroom I fell. I feel weak and I cant walk. Every time I get up I fall to my right side and I feel so dizzy." Pt lying in bed respirations even, unlabored, eyes open spontaneously, NAd noted, AAOX4, answering questions appropriately. Pt placed on cardiac monitoring, BP cycling and pulse ox. Pt reports N/V/D. Pt denies chest pain, SOB.   "

## 2018-12-19 NOTE — PLAN OF CARE
Problem: Adult Inpatient Plan of Care  Goal: Plan of Care Review  Outcome: Ongoing (interventions implemented as appropriate)  PLAN OF CARE REVIEWED WITH PT.  PT AA+OX4.  ABLE TO MAKE NEEDS KNOWN.  DOES NOT APPEAR TO BE IN ANY DISTRESS.  NO C/O PAIN.  WILL CONTINUE TO REASSESS PAIN.  REQUIRES MODERATE ASSIST WITH ADLS.  CONT. OF B/B.  PT REMAINS FREE FROM FALLS, INJURY, AND TRAUMA.  FALL PRECAUTIONS IN PLACE.  BED IN LOWEST POSITION WITH WHEELS LOCKED.  CALL LIGHT WITHIN REACH.  WILL CONTINUE TO MONITOR.

## 2018-12-19 NOTE — ASSESSMENT & PLAN NOTE
CT- No acute intracranial abnormalities    MRI/MRA Brain/Neck pending  Lipid panel pending  OT/PT/SLP Consult  Echo pending  Consult Neuro  Lower extremity dopplers pending

## 2018-12-19 NOTE — CONSULTS
[facility]  Neurology  Consult Note    Patient Name: Kaila Scott  MRN: [id number]  Admission Date: 12/18/2018  Hospital Length of Stay: 0 days  Code Status: Full Code   Attending Provider: Tamara Raniey MD   Consulting Provider: Demario Rodrigues MD  Primary Care Physician: [facility]  Principal Problem:Lacunar ataxic hemiparesis of dominant side    Consults   Subjective:     Chief Complaint:  stroke     HPI:   This is a 63-year-old  female referred for evaluation of feeling unsteady and veering to the right while walking. She presented to the [facility] ED with symptoms onset that morning.  She did report being seen at [facility] that morning with the above symptoms including nausea.  A CT scan the brain done in emergency did not show any acute abnormality or bleed.  The patient has had a history a prior CVA, diabetes, and hypertension.     She subsequently had an MRI scan of brain that showed evidence of an acute left parietal deep white matter lacunar infarct.  In addition, there was extensive periventricular white matter changes from chronic microvascular disease.  An MRA of the brain that showed irregularity along the the P2 and P3 segments of the posterior cerebral arteries bilaterally, likely from stenotic disease.  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  Small caliber of the basilar artery and the intracranial vertebral arteries, likely congenital in nature.  No intracranial aneurysms.  MRA of the neck showed no significant stenosis.     Past Medical History:   Diagnosis Date    Arthritis     Back pain with sciatica     Diabetes mellitus     Hypertension     Stroke        History reviewed. No pertinent surgical history.    Review of patient's allergies indicates:  No Known Allergies    Current Neurological  Medications: none    No current facility-administered medications on file prior to encounter.      Current Outpatient Medications on File Prior to Encounter   Medication Sig    carvedilol (COREG) 25 MG tablet Take 1 tablet (25 mg total) by mouth 2 (two) times daily with meals. (Patient taking differently: Take 25 mg by mouth once daily. )    lisinopril (PRINIVIL,ZESTRIL) 40 MG tablet Take 40 mg by mouth once daily.    metformin (GLUCOPHAGE) 500 MG tablet Take 1 tablet (500 mg total) by mouth 2 (two) times daily with meals. (Patient taking differently: Take 500 mg by mouth daily with breakfast. )    methylPREDNISolone (MEDROL DOSEPACK) 4 mg tablet use as directed    aspirin 325 MG tablet Take 1 tablet (325 mg total) by mouth once daily.    cyclobenzaprine (FLEXERIL) 10 MG tablet Take 1 tablet (10 mg total) by mouth 3 (three) times daily as needed for Muscle spasms.    HYDROcodone-acetaminophen (NORCO) 7.5-325 mg per tablet Take 1 tablet by mouth every 6 (six) hours as needed for Pain.    ibuprofen (ADVIL,MOTRIN) 400 MG tablet Take 1 tablet (400 mg total) by mouth 3 (three) times daily as needed.    pravastatin (PRAVACHOL) 80 MG tablet Take 1 tablet (80 mg total) by mouth once daily.    traMADol (ULTRAM) 50 mg tablet Take 1-2 tablets ( mg total) by mouth every 6 (six) hours as needed for Pain.     Family History     Problem Relation (Age of Onset)    Cancer Brother        Tobacco Use    Smoking status: Current Some Day Smoker     Packs/day: 0.50     Years: 40.00     Pack years: 20.00     Types: Cigarettes    Smokeless tobacco: Never Used   Substance and Sexual Activity    Alcohol use: No     Comment: Recently stopped    Drug use: No    Sexual activity: Not Currently     Review of Systems   Constitutional: Positive for activity change and fatigue. Negative for appetite change, chills and fever.   HENT: Negative for congestion and trouble swallowing.    Eyes: Positive for visual disturbance (double  vision).   Respiratory: Negative for chest tightness and shortness of breath.    Cardiovascular: Negative for chest pain, palpitations and leg swelling.   Gastrointestinal: Negative for abdominal pain, diarrhea, nausea and vomiting.   Endocrine: Negative.    Genitourinary: Negative for flank pain, frequency, hematuria and urgency.   Musculoskeletal: Positive for gait problem (falling to right side; difficulty walking).   Skin: Negative.    Neurological: Positive for dizziness, weakness, light-headedness and numbness (right lower leg). Negative for seizures, speech difficulty and headaches.   Hematological: Does not bruise/bleed easily.   Psychiatric/Behavioral: Negative.      Objective:     Vital Signs (Most Recent):  Temp: 98.3 °F (36.8 °C) (12/19/18 1533)  Pulse: 68 (12/19/18 1533)  Resp: 18 (12/19/18 1533)  BP: (!) 172/81 (12/19/18 1533)  SpO2: 96 % (12/19/18 1533) Vital Signs (24h Range):  Temp:  [98.2 °F (36.8 °C)-98.6 °F (37 °C)] 98.3 °F (36.8 °C)  Pulse:  [60-80] 68  Resp:  [15-20] 18  SpO2:  [93 %-96 %] 96 %  BP: (140-186)/(63-87) 172/81     Weight: 84.7 kg (186 lb 11.2 oz)  Body mass index is 27.57 kg/m².    Physical Exam   Constitutional: She appears well-developed and well-nourished.   HENT:   Head: Normocephalic and atraumatic.   Right Ear: Hearing normal.   Left Ear: Hearing normal.   Eyes: EOM are normal. Pupils are equal, round, and reactive to light.   Fundus examination showed sharp disc margins.   Neck: Normal range of motion. Neck supple. Carotid bruit is not present.   Neurological: She is alert. She displays atrophy (Minimal weakness in the right upper extremity and to a lesser degree in the right lower extremity) and abnormal reflex (DTRs generally depressed but symmetrical). No cranial nerve deficit (Visual fields at bedside testing essentially normal.  No facial asymmetry noted with facial movements and sensory exam being normal/symmetrical.  Corneals/gag reflexes normal.  Tongue & palate  movements normal.  Shoulder shrug was normal.) or sensory deficit (Primary sensations are symmetrical). She exhibits normal muscle tone. Coordination (Clumsiness fine motor movements with mild dysmetria on the right) and gait (Clumsy on the but is able to ambulate without assistance the leans to the right) abnormal. Right Babinski's sign: Plantar is equivocal on the right. She displays no Babinski's sign on the left side.   Mental status examination: Patient is fully oriented and able to give an adequate history.  Recall of recent and past information is good.  Immediate recall is normal.  Attention span and concentration was normal.  Judgment and insight is normal.  Language functions are intact with no evidence of aphasia or dysarthria.  Comprehension is unimpaired.  Affect is appropriate, mood was even.  No thought disorder is noted.   Vitals reviewed.      NEUROLOGICAL EXAMINATION:     CRANIAL NERVES     CN III, IV, VI   Pupils are equal, round, and reactive to light.  Extraocular motions are normal.       Significant Labs: All pertinent lab results from the past 24 hours have been reviewed.    Significant Imaging: I have reviewed all pertinent imaging results/findings within the past 24 hours.    Assessment and Plan:     * Lacunar ataxic hemiparesis of dominant side    Patient's clinical history and assessment is consistent with lacunar ataxic hemiparesis with MRI scan showing evidence a left lacunar infarct.  Vascular risk factors include hypertension, diabetes and hyperlipidemia .  She does have a history of prior strokes by history.    Recommendations:  The patient is stable neurological.  She would benefit from inpatient rehab.  This could be initiated as soon as she is medically stable.  Discussed with patient.         VTE Risk Mitigation (From admission, onward)        Ordered     heparin (porcine) injection 5,000 Units  Every 8 hours      12/18/18 2206     IP VTE HIGH RISK PATIENT  Once      12/18/18  9456          Thank you for your consult. I will sign off. Please contact us if you have any additional questions.    Demario Rodrigues MD  Neurology  [facility]

## 2018-12-19 NOTE — HPI
Ms. Kaila Scott is a 63 year old female, with a history of prior stroke without residual deficits, type 2 diabetes mellitus and hypertension who presented to the [facility] Department with complaints of dizziness, nausea and vomiting and unsteady gait that she first noted on Monday, December 17, 2018 at approximately 11:00 pm.  The patient states she was concerned she had food poisoning and she visited an outside hospital on Tuesday, December 18 where she was treated for nausea and vomiting and subsequently discharged home.    The patient reports that she continued to feel unsteady, continued falling to right side and with vision changes (double vision.)  She states she decided to come to [facility] for further evaluation. CT head completed on arrival to ED showed remote lacunar infarcts in the right corona radiata, bilateral basal ganglia and right thalamus appears similar to prior and without evidence of acute infarct, hemorrhage, or mass per CT report.  She denies fever/chills, chest pain, shortness of breath, abdominal pain, vomiting, diarrhea or urinary symptoms.  She admits to tobacco use (1 pack every 1.5 days). She denies alcohol or drug use.  She will be admitted under Observation for further evaluation of stroke like symptoms.

## 2018-12-19 NOTE — PT/OT/SLP EVAL
Occupational Therapy   Evaluation  PT-OT Co-Evaluation Session  Name: Kaila Scott  MRN: [id number]  Admitting Diagnosis:  Cerebrovascular accident (CVA) due to thrombosis      Recommendations:     Discharge Recommendations: rehabilitation facility  Discharge Equipment Recommendations:  walker, rolling, cane, quad  Barriers to discharge:  Decreased caregiver support    History:     Occupational Profile:  Living Environment: lives alone in a 2 story apartment (able to stay 1st floor) with no FIGUEROA at entry  Previous level of function: ambulatory without AD, drives a car, Independent ADLs and IADLs  Roles and Routines: 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 No pertinent surgical history.    Subjective     Chief Complaint: headache, weak, dizzy  Patient/Family Comments/goals: regain functional independence    Pain/Comfort:  Pain Rating 1: 9/10(at rest in bed)  Location - Orientation 1: generalized  Location 1: head  Pain Addressed 1: Cessation of Activity, Reposition  Pain Rating Post-Intervention 1: 9/10(pain with bed mobility only)     Assessment of Blood Pressure Status with Movement  Supine: /91  HR: 79  Sp02 99% RA  Sitting EOB: BP: 172/99  HR: 82  Standing NT (pt not able to retain standing balance due to RLE collapse and onset of weakness-dizzyness)    Patients cultural, spiritual, Confucianist conflicts given the current situation: no    Objective:     Communicated with: patient and her nurse prior to session.  Patient found with: all lines intact, call button in reach and bed alarm off and peripheral IV, telemetry upon OT entry to room.    General Precautions: Standard, fall, diabetic    Orthopedic Precautions:N/A   Braces: N/A     Occupational Performance:    Bed Mobility:    · Supervision Supine>sit EOB  · Supervision sit>supine    Functional Mobility/Transfers: (right lean with sitting and standing)        Min A sit><stand EOB        Min A bed><BSC transfer    She was not able to step  Without increased lateral lean to Right and intermittent increase in RLE strength with leg collapse    Activities of Daily Living:  · MI self-feeding bed level  · G/H NT due to onset of increased weakness and need to rest  · Independent UBD seated EOB  · MI LBD (doff/don socks) sitting EOB  · Toilet hygiene NT/ Declined need for BSC use    Cognitive/Visual Perceptual:  Cognitive/Psychosocial Skills:     -       Oriented to: Person, Place, Time and Situation   -       Follows Commands/attention:Follows two-step commands  -       Communication: clear/fluent  -       Memory: No Deficits noted  -       Safety awareness/insight to disability: impaired   -       Mood/Affect/Coping skills/emotional control: Cooperative, Anxious and Agitated  Visual/Perceptual:      -Impaired  tracking, acuity and Right eye, loss of eye tracking, and convergence (unclear if this is new onset).  At baseline, she wears glasses for distance and reading.    Physical Exam:  Balance:    -       sitting balance F+/lateral lean with head tilt to Right, lateral lean with RLE weakness standing EOB  Postural examination/scapula alignment:    -       lateral lean to Right with loss of midline spatial orientation,   Skin integrity: Visible skin intact  Edema:  None noted  Sensation:    -       Intact for light touch both hands  Muscle Tone: WNL BUE with symmetrical timing with reach and dexterity  Motor Planning:    -       WFL  Dominant hand:    -       Right  UE ROM: WFL BUE  UE s=Strength and Coordination: WFL BUE (no functional asymmetries)  Hand Function: WFL both hands (no functional asymmetries)  Gross motor coordination:   Fair + sitting  "balance (Right trunk lean), CGA standing with Right lateral lean (Right sided trunk weakness) and RLE weakness    AM PAC 6 Click ADL:  AM PAC Total Score: 20    Treatment & Education:  Midline spatial orientation (not able to realign pt to midline during testing)  Education:    Patient left with bed in chair position with all lines intact, call button in reach, chair alarm on, nurse notified and PT present    Assessment:     Kaila Scott is a 63 y.o. female with a medical diagnosis of Cerebrovascular accident (CVA) due to thrombosis.  She presents with the following performance deficits affecting function: weakness, decreased safety awareness, gait instability, impaired endurance, impaired balance, decreased lower extremity function, impaired self care skills(Right sided weakness).  The patient is in need of OT treatment to address perceptual-motor foundation deficits and train in self-care skill while in the hospital.    Rehab Prognosis: Good; patient would benefit from acute skilled OT services to address these deficits and reach maximum level of function.         Clinical Decision Makin.  OT Low:  "Pt evaluation falls under low complexity for evaluation coding due to performance deficits noted in 1-3 areas as stated above and 0 co-morbities affecting current functional status. Data obtained from problem focused assessments. No modifications or assistance was required for completion of evaluation. Only brief occupational profile and history review completed."     Plan:     Patient to be seen 6 x/week to address the above listed problems via self-care/home management, therapeutic activities, therapeutic exercises, neuromuscular re-education, sensory integration  · Plan of Care Expires:    · Plan of Care Reviewed with: patient    This Plan of care has been discussed with the patient who was involved in its development and understands and is in agreement with the identified goals and treatment " plan    GOALS:   Multidisciplinary Problems     Occupational Therapy Goals        Problem: Occupational Therapy Goal    Goal Priority Disciplines Outcome Interventions   Occupational Therapy Goal     OT, PT/OT Ongoing (interventions implemented as appropriate)    Description:  Goals to be met by 1/19/19  1. Min A G/H standing at sink  2. CGA LBD (doff/don briefs/slacks)  3. Assess toilet hygiene  4. Assess bathing  5. Maintain midline trunk alignment with 10 minute activity seated EOB                     Time Tracking:     OT Date of Treatment: 12/19/18  OT Start Time: 0935  OT Stop Time: 1005  OT Total Time (min): 30 min    Billable Minutes:Evaluation 30    SARAH Pierson  12/19/2018

## 2018-12-19 NOTE — ASSESSMENT & PLAN NOTE
"- CT head on admit with remote lacunar infarcts in the right corona radiata, bilateral basal ganglia and right thalamus appears similar to prior.  There is no evidence of acute infarct, hemorrhage, or mass.   - MRI with acute infarct left parietal lobe with tiny focus of "susceptibility changes" right caudate nucleus and lentiform nucleus and left thalamus   - MRA with irregularity along the the P2 and P3 segments of the posterior cerebral arteries bilaterally, likely from stenotic disease  - continue stroke work up with transthoracic echo pending, OT/PT/SLP and Neurology consult  - hold home blood pressure medication for permissive hypertension for now with SBP range 160-220  - for secondary stroke prevention: aspirin 325 mg daily, discontinue home statin and start atorvastatin 80 mg daily  - neuro checks q 4 hours for 24 hours  - continue fall precautions    "

## 2018-12-19 NOTE — ED PROVIDER NOTES
Encounter Date: 12/18/2018    SCRIBE #1 NOTE: I, Laura Alcantara, am scribing for, and in the presence of, Dr. Dupont.       History     Chief Complaint   Patient presents with    Gait Problem     pt reports falling to R side while walking starting at 1100 today post visit to [facility]     Time seen by provider: 6:18 PM    This is a 63 y.o. female, with a history of CVA, DM, and HTN, who presents with complaint of generalized weakness that began about 11 am this morning. Patient reports lightheadedness, nausea, and vomiting. She states she feeling unsteady and feeling like she is veering towards the right when she is walking. She reports previous stroke which she states presented with similar symptoms. She denies fever, chills, cough, abdominal pain, diarrhea, headache, speech difficulty, facial asymmetry, or numbness. She reports being seen at [facility] this morning for vomiting. She reports taking Asprin 325 mg daily. She states she did not take her antihypertensive medications today. She admits to tobacco use (1 pack every 1.5 days). She denies alcohol or drug use.       The history is provided by the patient.     Review of patient's allergies indicates:  No Known Allergies  Past Medical History:   Diagnosis Date    Arthritis     Back pain with sciatica     Diabetes mellitus     Hypertension     Stroke      History reviewed. No pertinent surgical history.  Family History   Problem Relation Age of Onset    Cancer Brother      Social History     Tobacco Use    Smoking status: Current Some Day Smoker     Packs/day: 0.50     Years: 40.00     Pack years: 20.00     Types: Cigarettes    Smokeless tobacco: Never Used   Substance Use Topics    Alcohol use: No     Comment: Recently stopped    Drug use: No     Review of Systems   Constitutional: Negative for chills and fever.   HENT: Negative for sore throat and trouble swallowing.    Eyes: Negative for visual disturbance.    Respiratory: Negative for shortness of breath.    Cardiovascular: Negative for chest pain.   Gastrointestinal: Positive for nausea and vomiting. Negative for abdominal pain and diarrhea.   Genitourinary: Negative for dysuria.   Musculoskeletal: Positive for gait problem. Negative for back pain.   Skin: Negative for rash.   Neurological: Positive for weakness (generalized) and light-headedness. Negative for facial asymmetry, speech difficulty, numbness and headaches.       Physical Exam     Initial Vitals [12/18/18 1811]   BP Pulse Resp Temp SpO2   (!) 183/87 70 18 98.6 °F (37 °C) (!) 93 %      MAP       --         Physical Exam    Nursing note and vitals reviewed.  Constitutional: She appears well-developed and well-nourished. No distress.   HENT:   Head: Normocephalic and atraumatic.   Mildly dry mucous membranes.    Eyes: Conjunctivae and EOM are normal. Pupils are equal, round, and reactive to light.   No pallor or icterus.    Neck: Normal range of motion. Neck supple.   Cardiovascular: Normal rate, regular rhythm and normal heart sounds. Exam reveals no gallop and no friction rub.    No murmur heard.  Pulmonary/Chest: Breath sounds normal. No respiratory distress. She has no wheezes. She has no rhonchi. She has no rales.   Abdominal: Soft. There is no tenderness. There is no rebound and no guarding.   Musculoskeletal: Normal range of motion. She exhibits no edema or tenderness.   Neurological: She is alert and oriented to person, place, and time.   No facial asymmetry. Intact sensation throughout face and extremities. 5/5 strength throughout extremities both distal and proximal. With standing pt is unsteady. Fails Romberg with even with eyes open. Unsteady gait with wide base.    Skin: Skin is warm and dry.   Psychiatric: She has a normal mood and affect. Her behavior is normal. Judgment and thought content normal.         ED Course   Critical Care  Date/Time: 12/18/2018 11:07 PM  Performed by: Demario CAI  Kiah MOORE MD  Authorized by: Tamara Rainey MD   Direct patient critical care time: 20 minutes  Additional history critical care time: 5 minutes  Ordering / reviewing critical care time: 10 minutes  Documentation critical care time: 10 minutes  Consulting other physicians critical care time: 5 minutes  Total critical care time (exclusive of procedural time) : 50 minutes  Critical care was necessary to treat or prevent imminent or life-threatening deterioration of the following conditions: cardiac failure, dehydration and CNS failure or compromise.        Labs Reviewed   CBC W/ AUTO DIFFERENTIAL - Abnormal; Notable for the following components:       Result Value    MCH 32.3 (*)     All other components within normal limits   COMPREHENSIVE METABOLIC PANEL - Abnormal; Notable for the following components:    Glucose 136 (*)     AST 44 (*)     ALT 66 (*)     All other components within normal limits   URINALYSIS - Abnormal; Notable for the following components:    Ketones, UA 1+ (*)     All other components within normal limits     EKG Readings: (Independently Interpreted)   Normal sinus rhythm at a rate of 57 bpm. No ischemia or arrhthymia.         Imaging Results          CT Head Without Contrast (Final result)  Result time 12/18/18 20:06:14    Final result by Manjinder Alcazar MD (12/18/18 20:06:14)                 Impression:      No acute intracranial abnormalities    Stable chronic findings.      Electronically signed by: Manjinder Alcazar MD  Date:    12/18/2018  Time:    20:06             Narrative:    EXAMINATION:  CT HEAD WITHOUT CONTRAST    CLINICAL HISTORY:  Dizziness;    TECHNIQUE:  Low dose axial images were obtained through the head.  Coronal and sagittal reformations were also performed. Contrast was not administered.    COMPARISON:  None.    FINDINGS:  Remote lacunar infarcts in the right corona radiata, bilateral basal ganglia and right thalamus appears similar to prior.  There is no evidence of  acute infarct, hemorrhage, or mass.  There is ventricular and sulcal enlargement consistent with mild generalized atrophy.  Moderate confluent decreased supratentorial white matter attenuation most likely related to chronic nonspecific small vessel disease.   No mass-effect or midline shift.  There are no abnormal extra-axial fluid collections.  The paranasal sinuses and mastoid air cells are essentially clear .  The calvarium appears intact.  .                                 Medical Decision Making:   Independently Interpreted Test(s):   I have ordered and independently interpreted EKG Reading(s) - see prior notes  Clinical Tests:   Lab Tests: Ordered and Reviewed  Radiological Study: Ordered and Reviewed  Medical Tests: Ordered and Reviewed  ED Management:  8:51 PM Case discussed with Sukhwinder Conrad NP, hospital medicine, who will admit the patient to Dr. Rainey for observation.                Attending Attestation:           Physician Attestation for Scribe:  Physician Attestation Statement for Scribe #1: I, Dr. Dupont, reviewed documentation, as scribed by Laura Alcantara in my presence, and it is both accurate and complete.           Patient presents due to development of unsteadiness when walking approximately 11:00 a.m..  She had been seen shortly prior to that at another facility due to nausea and vomiting.  The nausea and vomiting continued.  She complains of generalized weakness, denies any focal weakness or numbness.  No change in vision or hearing or speech.  She has had similar episodes of unsteadiness with ambulation with what she describes as prior TIAs.  Her CT scan shows old lacunar infarcts no acute findings.  Because of the ataxia on exam the feel she should be admitted for further workup such as MRI, neuro checks, blood pressure control.  The nausea and vomiting also persisting, and had to continue treating her poorly controlled hypertension with parenteral antihypertensives.  Case discussed with  the hospitalist service.             Clinical Impression:     1. Ataxia    2. Weakness    3. History of TIAs    4. Nausea and vomiting, intractability of vomiting not specified, unspecified vomiting type    5. TIA (transient ischemic attack)                                   Demario Dupont II, MD  12/18/18 5418

## 2018-12-19 NOTE — PROGRESS NOTES
[facility] Hospital Medicine  Progress Note    Patient Name: Kaila Scott  MRN: [id number]  Patient Class: IP- Inpatient   Admission Date: 12/18/2018  Length of Stay: 0 days  Attending Physician: Tamara Rainey MD  Primary Care Provider: [facility]        Subjective:     Principal Problem:Cerebrovascular accident (CVA) due to thrombosis    HPI:  Ms. Kaila Scott is a 63 year old female, with a history of prior stroke without residual deficits, type 2 diabetes mellitus and hypertension who presented to the [facility] with complaints of dizziness, nausea and vomiting and unsteady gait that she first noted on Monday, December 17, 2018 at approximately 11:00 pm.  The patient states she was concerned she had food poisoning and she visited an outside hospital on Tuesday, December 18 where she was treated for nausea and vomiting and subsequently discharged home.    The patient reports that she continued to feel unsteady, continued falling to right side and with vision changes (double vision.)  She states she decided to come to [facility] ED for further evaluation. CT head completed on arrival to ED showed remote lacunar infarcts in the right corona radiata, bilateral basal ganglia and right thalamus appears similar to prior and without evidence of acute infarct, hemorrhage, or mass per CT report.  She denies fever/chills, chest pain, shortness of breath, abdominal pain, vomiting, diarrhea or urinary symptoms.  She admits to tobacco use (1 pack every 1.5 days). She denies alcohol or drug use.  She will be admitted under Observation for further evaluation of stroke like symptoms.        Hospital Course:  No notes on file    Interval History:  Continues with gait disturbance and intermittent double vision; MRI with acute left parietal infarct; MRA negative for acute stenosis or occlusion; Neurology consult pending; PT/OT; secondary stroke prevention and  permissive hypertension for now    Review of Systems   Constitutional: Positive for activity change and fatigue. Negative for appetite change, chills and fever.   HENT: Negative for congestion and trouble swallowing.    Eyes: Positive for visual disturbance (double vision).   Respiratory: Negative for chest tightness and shortness of breath.    Cardiovascular: Negative for chest pain, palpitations and leg swelling.   Gastrointestinal: Negative for abdominal pain, diarrhea, nausea and vomiting.   Endocrine: Negative.    Genitourinary: Negative for flank pain, frequency, hematuria and urgency.   Musculoskeletal: Positive for gait problem (falling to right side; difficulty walking).   Skin: Negative.    Neurological: Positive for dizziness, weakness, light-headedness and numbness (right lower leg). Negative for seizures, speech difficulty and headaches.   Hematological: Does not bruise/bleed easily.   Psychiatric/Behavioral: Negative.      Objective:     Vital Signs (Most Recent):  Temp: 98.6 °F (37 °C) (12/19/18 1204)  Pulse: 65 (12/19/18 1204)  Resp: 18 (12/19/18 1204)  BP: (!) 182/86 (12/19/18 1204)  SpO2: 95 % (12/19/18 1204) Vital Signs (24h Range):  Temp:  [98.2 °F (36.8 °C)-98.6 °F (37 °C)] 98.6 °F (37 °C)  Pulse:  [60-80] 65  Resp:  [15-20] 18  SpO2:  [93 %-96 %] 95 %  BP: (140-186)/(63-87) 182/86     Weight: 84.7 kg (186 lb 11.2 oz)  Body mass index is 27.57 kg/m².    Intake/Output Summary (Last 24 hours) at 12/19/2018 1439  Last data filed at 12/19/2018 0550  Gross per 24 hour   Intake 1240 ml   Output --   Net 1240 ml      Physical Exam   Constitutional: She is oriented to person, place, and time. She appears well-developed and well-nourished. No distress.   HENT:   Head: Normocephalic and atraumatic.   Mouth/Throat: Oropharynx is clear and moist.   Eyes: Conjunctivae are normal. Pupils are equal, round, and reactive to light. Right eye exhibits no nystagmus. Left eye exhibits abnormal extraocular motion  (gaze deviation to right that corrects with movement). Left eye exhibits no nystagmus.   Neck: Normal range of motion. Neck supple. No JVD present.   Cardiovascular: Normal rate, normal heart sounds and intact distal pulses. An irregular rhythm present.   No murmur heard.  Pulses:       Radial pulses are 2+ on the right side, and 2+ on the left side.        Dorsalis pedis pulses are 1+ on the right side, and 1+ on the left side.   Pulmonary/Chest: Effort normal. No respiratory distress. She has decreased breath sounds in the left lower field.   Abdominal: Soft. She exhibits no distension. Bowel sounds are decreased. There is no tenderness.   Musculoskeletal: Normal range of motion.   Neurological: She is alert and oriented to person, place, and time. She has normal strength.   Skin: Skin is warm and dry. Capillary refill takes 2 to 3 seconds.   Psychiatric: She has a normal mood and affect. Her speech is normal and behavior is normal.     Stroke Team Times:   Date and Time Taken: 2018 1:43 PM  Last Known Normal Date and Time : 201822:00    NIH Stroke Scale:  Interval: baseline (upon arrival/admit)  Level of Consciousness: 0 - alert  LOC Questions: 0 - answers both correctly  LOC Commands: 0 - performs both correctly  Best Gaze: 1 - partial gaze palsy  Visual: 0 - no visual loss  Facial Palsy: 1 - minor  Motor Left Arm: 0 - no drift  Motor Right Arm: 0 - no drift  Motor Left Le - no drift  Motor Right Le - no drift  Limb Ataxia: 1 - present in one limb  Sensory: 1 - mild to moderate loss  Best Language: 0 - no aphasia  Dysarthria: 0 - normal articulation  Extinction and Inattention: 0 - no neglect  NIH Stroke Scale Total: 4        Significant Labs:   CBC:   Recent Labs   Lab 180 18  0507   WBC 7.86 10.17   HGB 15.2 13.5   HCT 44.2 39.5    234     CMP:   Recent Labs   Lab 180 18  0507    139   K 3.6 3.3*    106   CO2 26 23   * 107   BUN 9  10   CREATININE 0.8 0.8   CALCIUM 9.9 9.8   PROT 8.0 7.3   ALBUMIN 3.7 3.4*   BILITOT 0.8 1.0   ALKPHOS 75 74   AST 44* 36   ALT 66* 56*   ANIONGAP 10 10   EGFRNONAA >60 >60        All pertinent labs within the past 24 hours have been reviewed.    Significant Imaging: I have reviewed all pertinent imaging results/findings within the past 24 hours.     Imaging Results          CT Head Without Contrast (Final result)  Result time 12/18/18 20:06:14    Final result by Manjinder Alcazar MD (12/18/18 20:06:14)                 Impression:      No acute intracranial abnormalities    Stable chronic findings.      Electronically signed by: Manjinder Alcazar MD  Date:    12/18/2018  Time:    20:06             Narrative:    EXAMINATION:  CT HEAD WITHOUT CONTRAST    CLINICAL HISTORY:  Dizziness;    TECHNIQUE:  Low dose axial images were obtained through the head.  Coronal and sagittal reformations were also performed. Contrast was not administered.    COMPARISON:  None.    FINDINGS:  Remote lacunar infarcts in the right corona radiata, bilateral basal ganglia and right thalamus appears similar to prior.  There is no evidence of acute infarct, hemorrhage, or mass.  There is ventricular and sulcal enlargement consistent with mild generalized atrophy.  Moderate confluent decreased supratentorial white matter attenuation most likely related to chronic nonspecific small vessel disease.   No mass-effect or midline shift.  There are no abnormal extra-axial fluid collections.  The paranasal sinuses and mastoid air cells are essentially clear .  The calvarium appears intact.  .                                  Assessment/Plan:      * Cerebrovascular accident (CVA) due to thrombosis    - CT head on admit with remote lacunar infarcts in the right corona radiata, bilateral basal ganglia and right thalamus appears similar to prior.  There is no evidence of acute infarct, hemorrhage, or mass.   - MRI with acute infarct left parietal lobe with  "tiny focus of "susceptibility changes" right caudate nucleus and lentiform nucleus and left thalamus   - MRA with irregularity along the the P2 and P3 segments of the posterior cerebral arteries bilaterally, likely from stenotic disease  - continue stroke work up with transthoracic echo pending, OT/PT/SLP and Neurology consult  - hold home blood pressure medication for permissive hypertension for now with SBP range 160-220  - for secondary stroke prevention: aspirin 325 mg daily, discontinue home statin and start atorvastatin 80 mg daily  - neuro checks q 4 hours for 24 hours  - continue fall precautions       Essential hypertension    Hold home blood pressure medication for now to allow for permissive hypertension with SBP range 160-220  -labetalol PRN for SBP > 220       Type 2 diabetes mellitus without complication    A1c 5.8 on admit  Low dose SSI  BG AC/HS       Hyperlipidemia    Discontinue pravastatin and start atrovastatin 80 mg daily       GERD (gastroesophageal reflux disease)    Start protonix 40 mg daily       Tobacco abuse    Continue to offer smoking cessation education         VTE Risk Mitigation (From admission, onward)        Ordered     heparin (porcine) injection 5,000 Units  Every 8 hours      12/18/18 2206     IP VTE HIGH RISK PATIENT  Once      12/18/18 2206              Kathryn Ayala NP  Department of Hospital Medicine   [facility]  "

## 2018-12-19 NOTE — H&P
[facility] Hospital Medicine  History & Physical    Patient Name: Kaila Scott  MRN: [id number]  Admission Date: 12/18/2018  Attending Physician: Tamara Rainey MD   Primary Care Provider: [facility]         Patient information was obtained from patient, past medical records and ER records.     Subjective:     Principal Problem:TIA (transient ischemic attack)    Chief Complaint:   Chief Complaint   Patient presents with    Gait Problem     pt reports falling to R side while walking starting at 1100 today post visit to [facility]        HPI: The patient is a 63 y.o. female, with a history of CVA, DM, and HTN, who presents with complaint of generalized weakness that began about 11 am this morning. Patient reports lightheadedness, nausea, and vomiting. She states she feeling unsteady and feeling like she is veering towards the right when she is walking. She reports previous stroke which she states presented with similar symptoms. She denies fever, chills, cough, abdominal pain, diarrhea, headache, speech difficulty, facial asymmetry, or numbness. She reports being seen at [facility] this morning for vomiting. She reports taking Asprin 325 mg daily. She admits to tobacco use (1 pack every 1.5 days). She denies alcohol or drug use.         Past Medical History:   Diagnosis Date    Arthritis     Back pain with sciatica     Diabetes mellitus     Hypertension     Stroke        No past surgical history on file.    Review of patient's allergies indicates:  No Known Allergies    No current facility-administered medications on file prior to encounter.      Current Outpatient Medications on File Prior to Encounter   Medication Sig    carvedilol (COREG) 25 MG tablet Take 1 tablet (25 mg total) by mouth 2 (two) times daily with meals. (Patient taking differently: Take 25 mg by mouth once daily. )    lisinopril (PRINIVIL,ZESTRIL) 40 MG tablet  Take 40 mg by mouth once daily.    metformin (GLUCOPHAGE) 500 MG tablet Take 1 tablet (500 mg total) by mouth 2 (two) times daily with meals. (Patient taking differently: Take 500 mg by mouth daily with breakfast. )    methylPREDNISolone (MEDROL DOSEPACK) 4 mg tablet use as directed    aspirin 325 MG tablet Take 1 tablet (325 mg total) by mouth once daily.    cyclobenzaprine (FLEXERIL) 10 MG tablet Take 1 tablet (10 mg total) by mouth 3 (three) times daily as needed for Muscle spasms.    HYDROcodone-acetaminophen (NORCO) 7.5-325 mg per tablet Take 1 tablet by mouth every 6 (six) hours as needed for Pain.    ibuprofen (ADVIL,MOTRIN) 400 MG tablet Take 1 tablet (400 mg total) by mouth 3 (three) times daily as needed.    pravastatin (PRAVACHOL) 80 MG tablet Take 1 tablet (80 mg total) by mouth once daily.    traMADol (ULTRAM) 50 mg tablet Take 1-2 tablets ( mg total) by mouth every 6 (six) hours as needed for Pain.     Family History     Problem Relation (Age of Onset)    Cancer Brother        Tobacco Use    Smoking status: Current Some Day Smoker     Packs/day: 0.50     Years: 40.00     Pack years: 20.00     Types: Cigarettes    Smokeless tobacco: Never Used   Substance and Sexual Activity    Alcohol use: No     Comment: Recently stopped    Drug use: No    Sexual activity: Not Currently     Review of Systems   Constitutional: Positive for activity change and fatigue. Negative for appetite change and fever.   HENT: Negative for congestion, ear pain, rhinorrhea and sinus pressure.    Eyes: Negative for pain and discharge.   Respiratory: Negative for cough, chest tightness, shortness of breath and wheezing.    Cardiovascular: Negative for chest pain and leg swelling.   Gastrointestinal: Negative for abdominal distention, abdominal pain, diarrhea, nausea and vomiting.   Endocrine: Negative for cold intolerance and heat intolerance.   Genitourinary: Negative for difficulty urinating, flank pain,  frequency, hematuria and urgency.   Musculoskeletal: Positive for arthralgias and myalgias. Negative for joint swelling.   Allergic/Immunologic: Negative for environmental allergies and food allergies.   Neurological: Positive for dizziness, weakness and light-headedness. Negative for headaches.   Hematological: Does not bruise/bleed easily.   Psychiatric/Behavioral: Negative for agitation, behavioral problems and decreased concentration.     Objective:     Vital Signs (Most Recent):  Temp: 98.2 °F (36.8 °C) (12/18/18 2228)  Pulse: 72 (12/18/18 2228)  Resp: 18 (12/18/18 2228)  BP: (!) 140/63 (12/18/18 2228)  SpO2: 95 % (12/18/18 2228) Vital Signs (24h Range):  Temp:  [98.2 °F (36.8 °C)-98.6 °F (37 °C)] 98.2 °F (36.8 °C)  Pulse:  [64-80] 72  Resp:  [15-20] 18  SpO2:  [93 %-95 %] 95 %  BP: (140-186)/(63-87) 140/63     Weight: 82.6 kg (182 lb)  Body mass index is 26.88 kg/m².    Physical Exam   Constitutional: She is oriented to person, place, and time. She appears well-developed and well-nourished.   HENT:   Head: Normocephalic.   Eyes: Conjunctivae are normal. Right eye exhibits no discharge. Left eye exhibits no discharge.   Neck: Normal range of motion. Neck supple.   Cardiovascular: Normal rate, normal heart sounds and intact distal pulses. An irregular rhythm present.   Pulses:       Radial pulses are 2+ on the right side, and 2+ on the left side.        Dorsalis pedis pulses are 1+ on the right side, and 1+ on the left side.   Pulmonary/Chest: Effort normal. No respiratory distress. She has decreased breath sounds in the left lower field.   Abdominal: Soft. She exhibits no distension. Bowel sounds are decreased. There is no tenderness.   Musculoskeletal: Normal range of motion.   Neurological: She is alert and oriented to person, place, and time. She has normal strength. GCS eye subscore is 4. GCS verbal subscore is 5. GCS motor subscore is 6.   Skin: Skin is warm and dry. Capillary refill takes 2 to 3 seconds.    Psychiatric: She has a normal mood and affect. Her speech is normal and behavior is normal.           Significant Labs:   CBC:   Recent Labs   Lab 12/18/18 1910   WBC 7.86   HGB 15.2   HCT 44.2        CMP:   Recent Labs   Lab 12/18/18 1910      K 3.6      CO2 26   *   BUN 9   CREATININE 0.8   CALCIUM 9.9   PROT 8.0   ALBUMIN 3.7   BILITOT 0.8   ALKPHOS 75   AST 44*   ALT 66*   ANIONGAP 10   EGFRNONAA >60       Significant Imaging: I have reviewed all pertinent imaging results/findings within the past 24 hours.    Assessment/Plan:     * TIA (transient ischemic attack)    CT- No acute intracranial abnormalities    MRI/MRA Brain/Neck pending  Lipid panel pending  OT/PT/SLP Consult  Echo pending  Consult Neuro  Lower extremity dopplers pending       Essential hypertension    Hypertensive currently    Will allow for HTN to 220/120  Hold BP meds currently       Type 2 diabetes mellitus without complication    BG- 136    A1c pending  Low dose SSI  BG AC/HS       Hyperlipidemia    LIpid panel pending    Continue pravastatin         VTE Risk Mitigation (From admission, onward)        Ordered     heparin (porcine) injection 5,000 Units  Every 8 hours      12/18/18 2206     IP VTE HIGH RISK PATIENT  Once      12/18/18 2206             Josemanuel Conrad NP  Department of Hospital Medicine   [facility]

## 2018-12-19 NOTE — SUBJECTIVE & OBJECTIVE
Past Medical History:   Diagnosis Date    Arthritis     Back pain with sciatica     Diabetes mellitus     Hypertension     Stroke        No past surgical history on file.    Review of patient's allergies indicates:  No Known Allergies    No current facility-administered medications on file prior to encounter.      Current Outpatient Medications on File Prior to Encounter   Medication Sig    carvedilol (COREG) 25 MG tablet Take 1 tablet (25 mg total) by mouth 2 (two) times daily with meals. (Patient taking differently: Take 25 mg by mouth once daily. )    lisinopril (PRINIVIL,ZESTRIL) 40 MG tablet Take 40 mg by mouth once daily.    metformin (GLUCOPHAGE) 500 MG tablet Take 1 tablet (500 mg total) by mouth 2 (two) times daily with meals. (Patient taking differently: Take 500 mg by mouth daily with breakfast. )    methylPREDNISolone (MEDROL DOSEPACK) 4 mg tablet use as directed    aspirin 325 MG tablet Take 1 tablet (325 mg total) by mouth once daily.    cyclobenzaprine (FLEXERIL) 10 MG tablet Take 1 tablet (10 mg total) by mouth 3 (three) times daily as needed for Muscle spasms.    HYDROcodone-acetaminophen (NORCO) 7.5-325 mg per tablet Take 1 tablet by mouth every 6 (six) hours as needed for Pain.    ibuprofen (ADVIL,MOTRIN) 400 MG tablet Take 1 tablet (400 mg total) by mouth 3 (three) times daily as needed.    pravastatin (PRAVACHOL) 80 MG tablet Take 1 tablet (80 mg total) by mouth once daily.    traMADol (ULTRAM) 50 mg tablet Take 1-2 tablets ( mg total) by mouth every 6 (six) hours as needed for Pain.     Family History     Problem Relation (Age of Onset)    Cancer Brother        Tobacco Use    Smoking status: Current Some Day Smoker     Packs/day: 0.50     Years: 40.00     Pack years: 20.00     Types: Cigarettes    Smokeless tobacco: Never Used   Substance and Sexual Activity    Alcohol use: No     Comment: Recently stopped    Drug use: No    Sexual activity: Not Currently     Review of  Systems   Constitutional: Positive for activity change and fatigue. Negative for appetite change and fever.   HENT: Negative for congestion, ear pain, rhinorrhea and sinus pressure.    Eyes: Negative for pain and discharge.   Respiratory: Negative for cough, chest tightness, shortness of breath and wheezing.    Cardiovascular: Negative for chest pain and leg swelling.   Gastrointestinal: Negative for abdominal distention, abdominal pain, diarrhea, nausea and vomiting.   Endocrine: Negative for cold intolerance and heat intolerance.   Genitourinary: Negative for difficulty urinating, flank pain, frequency, hematuria and urgency.   Musculoskeletal: Positive for arthralgias and myalgias. Negative for joint swelling.   Allergic/Immunologic: Negative for environmental allergies and food allergies.   Neurological: Positive for dizziness, weakness and light-headedness. Negative for headaches.   Hematological: Does not bruise/bleed easily.   Psychiatric/Behavioral: Negative for agitation, behavioral problems and decreased concentration.     Objective:     Vital Signs (Most Recent):  Temp: 98.2 °F (36.8 °C) (12/18/18 2228)  Pulse: 72 (12/18/18 2228)  Resp: 18 (12/18/18 2228)  BP: (!) 140/63 (12/18/18 2228)  SpO2: 95 % (12/18/18 2228) Vital Signs (24h Range):  Temp:  [98.2 °F (36.8 °C)-98.6 °F (37 °C)] 98.2 °F (36.8 °C)  Pulse:  [64-80] 72  Resp:  [15-20] 18  SpO2:  [93 %-95 %] 95 %  BP: (140-186)/(63-87) 140/63     Weight: 82.6 kg (182 lb)  Body mass index is 26.88 kg/m².    Physical Exam   Constitutional: She is oriented to person, place, and time. She appears well-developed and well-nourished.   HENT:   Head: Normocephalic.   Eyes: Conjunctivae are normal. Right eye exhibits no discharge. Left eye exhibits no discharge.   Neck: Normal range of motion. Neck supple.   Cardiovascular: Normal rate, normal heart sounds and intact distal pulses. An irregular rhythm present.   Pulses:       Radial pulses are 2+ on the right side,  and 2+ on the left side.        Dorsalis pedis pulses are 1+ on the right side, and 1+ on the left side.   Pulmonary/Chest: Effort normal. No respiratory distress. She has decreased breath sounds in the left lower field.   Abdominal: Soft. She exhibits no distension. Bowel sounds are decreased. There is no tenderness.   Musculoskeletal: Normal range of motion.   Neurological: She is alert and oriented to person, place, and time. She has normal strength. GCS eye subscore is 4. GCS verbal subscore is 5. GCS motor subscore is 6.   Skin: Skin is warm and dry. Capillary refill takes 2 to 3 seconds.   Psychiatric: She has a normal mood and affect. Her speech is normal and behavior is normal.           Significant Labs:   CBC:   Recent Labs   Lab 12/18/18 1910   WBC 7.86   HGB 15.2   HCT 44.2        CMP:   Recent Labs   Lab 12/18/18 1910      K 3.6      CO2 26   *   BUN 9   CREATININE 0.8   CALCIUM 9.9   PROT 8.0   ALBUMIN 3.7   BILITOT 0.8   ALKPHOS 75   AST 44*   ALT 66*   ANIONGAP 10   EGFRNONAA >60       Significant Imaging: I have reviewed all pertinent imaging results/findings within the past 24 hours.

## 2018-12-19 NOTE — ED NOTES
Pt lying in bed, respirations even, unlabored, eyes open spontaneously, NAD noted, call bell within reach. Pt updated on plan of care, will continue to monitor with BP cycling, pulse ox, and cardiac monitoring

## 2018-12-19 NOTE — PLAN OF CARE
Met with patient at bedside to discuss PT/OT recommendations for IRF - patient agrees & voiced her preference as [facility] - referral forwarded via [facility]

## 2018-12-19 NOTE — SUBJECTIVE & OBJECTIVE
Past Medical History:   Diagnosis Date    Arthritis     Back pain with sciatica     Diabetes mellitus     Hypertension     Stroke        History reviewed. No pertinent surgical history.    Review of patient's allergies indicates:  No Known Allergies    Current Neurological Medications: none    No current facility-administered medications on file prior to encounter.      Current Outpatient Medications on File Prior to Encounter   Medication Sig    carvedilol (COREG) 25 MG tablet Take 1 tablet (25 mg total) by mouth 2 (two) times daily with meals. (Patient taking differently: Take 25 mg by mouth once daily. )    lisinopril (PRINIVIL,ZESTRIL) 40 MG tablet Take 40 mg by mouth once daily.    metformin (GLUCOPHAGE) 500 MG tablet Take 1 tablet (500 mg total) by mouth 2 (two) times daily with meals. (Patient taking differently: Take 500 mg by mouth daily with breakfast. )    methylPREDNISolone (MEDROL DOSEPACK) 4 mg tablet use as directed    aspirin 325 MG tablet Take 1 tablet (325 mg total) by mouth once daily.    cyclobenzaprine (FLEXERIL) 10 MG tablet Take 1 tablet (10 mg total) by mouth 3 (three) times daily as needed for Muscle spasms.    HYDROcodone-acetaminophen (NORCO) 7.5-325 mg per tablet Take 1 tablet by mouth every 6 (six) hours as needed for Pain.    ibuprofen (ADVIL,MOTRIN) 400 MG tablet Take 1 tablet (400 mg total) by mouth 3 (three) times daily as needed.    pravastatin (PRAVACHOL) 80 MG tablet Take 1 tablet (80 mg total) by mouth once daily.    traMADol (ULTRAM) 50 mg tablet Take 1-2 tablets ( mg total) by mouth every 6 (six) hours as needed for Pain.     Family History     Problem Relation (Age of Onset)    Cancer Brother        Tobacco Use    Smoking status: Current Some Day Smoker     Packs/day: 0.50     Years: 40.00     Pack years: 20.00     Types: Cigarettes    Smokeless tobacco: Never Used   Substance and Sexual Activity    Alcohol use: No     Comment: Recently stopped    Drug  use: No    Sexual activity: Not Currently     Review of Systems   Constitutional: Positive for activity change and fatigue. Negative for appetite change, chills and fever.   HENT: Negative for congestion and trouble swallowing.    Eyes: Positive for visual disturbance (double vision).   Respiratory: Negative for chest tightness and shortness of breath.    Cardiovascular: Negative for chest pain, palpitations and leg swelling.   Gastrointestinal: Negative for abdominal pain, diarrhea, nausea and vomiting.   Endocrine: Negative.    Genitourinary: Negative for flank pain, frequency, hematuria and urgency.   Musculoskeletal: Positive for gait problem (falling to right side; difficulty walking).   Skin: Negative.    Neurological: Positive for dizziness, weakness, light-headedness and numbness (right lower leg). Negative for seizures, speech difficulty and headaches.   Hematological: Does not bruise/bleed easily.   Psychiatric/Behavioral: Negative.      Objective:     Vital Signs (Most Recent):  Temp: 98.3 °F (36.8 °C) (12/19/18 1533)  Pulse: 68 (12/19/18 1533)  Resp: 18 (12/19/18 1533)  BP: (!) 172/81 (12/19/18 1533)  SpO2: 96 % (12/19/18 1533) Vital Signs (24h Range):  Temp:  [98.2 °F (36.8 °C)-98.6 °F (37 °C)] 98.3 °F (36.8 °C)  Pulse:  [60-80] 68  Resp:  [15-20] 18  SpO2:  [93 %-96 %] 96 %  BP: (140-186)/(63-87) 172/81     Weight: 84.7 kg (186 lb 11.2 oz)  Body mass index is 27.57 kg/m².    Physical Exam   Constitutional: She appears well-developed and well-nourished.   HENT:   Head: Normocephalic and atraumatic.   Right Ear: Hearing normal.   Left Ear: Hearing normal.   Eyes: EOM are normal. Pupils are equal, round, and reactive to light.   Fundus examination showed sharp disc margins.   Neck: Normal range of motion. Neck supple. Carotid bruit is not present.   Neurological: She is alert. She displays atrophy (Minimal weakness in the right upper extremity and to a lesser degree in the right lower extremity) and  abnormal reflex (DTRs generally depressed but symmetrical). No cranial nerve deficit (Visual fields at bedside testing essentially normal.  No facial asymmetry noted with facial movements and sensory exam being normal/symmetrical.  Corneals/gag reflexes normal.  Tongue & palate movements normal.  Shoulder shrug was normal.) or sensory deficit (Primary sensations are symmetrical). She exhibits normal muscle tone. Coordination (Clumsiness fine motor movements with mild dysmetria on the right) and gait (Clumsy on the but is able to ambulate without assistance the leans to the right) abnormal. Right Babinski's sign: Plantar is equivocal on the right. She displays no Babinski's sign on the left side.   Mental status examination: Patient is fully oriented and able to give an adequate history.  Recall of recent and past information is good.  Immediate recall is normal.  Attention span and concentration was normal.  Judgment and insight is normal.  Language functions are intact with no evidence of aphasia or dysarthria.  Comprehension is unimpaired.  Affect is appropriate, mood was even.  No thought disorder is noted.   Vitals reviewed.      NEUROLOGICAL EXAMINATION:     CRANIAL NERVES     CN III, IV, VI   Pupils are equal, round, and reactive to light.  Extraocular motions are normal.       Significant Labs: All pertinent lab results from the past 24 hours have been reviewed.    Significant Imaging: I have reviewed all pertinent imaging results/findings within the past 24 hours.

## 2018-12-19 NOTE — ASSESSMENT & PLAN NOTE
Patient's clinical history and assessment is consistent with lacunar ataxic hemiparesis with MRI scan showing evidence a left lacunar infarct.  Vascular risk factors include hypertension, diabetes and hyperlipidemia .  She does have a history of prior strokes by history.    Recommendations:  The patient is stable neurological.  She would benefit from inpatient rehab.  This could be initiated as soon as she is medically stable.  Discussed with patient.

## 2018-12-19 NOTE — ED NOTES
Pt lying in bed, respirations even, unlabored, eyes open spontaneously, NAD noted. PT updated on plan of care, will continue to monitor with BP cycling, pulse ox, and cardiac monitoring

## 2018-12-19 NOTE — PLAN OF CARE
SW met with pt at bedside to complete discharge assessment, verified PCP and uses [facility] [address]  Pt's daughter, Sarah, present, will provide transportation home.  No needs identified at this time.     12/19/18 1217   Discharge Assessment   Assessment Type Discharge Planning Assessment   Confirmed/corrected address and phone number on facesheet? Yes   Assessment information obtained from? Patient   Communicated expected length of stay with patient/caregiver no   Prior to hospitilization cognitive status: Alert/Oriented   Prior to hospitalization functional status: Independent   Current cognitive status: Alert/Oriented   Lives With alone   Able to Return to Prior Arrangements yes   Is patient able to care for self after discharge? Unable to determine at this time (comments)   Readmission Within the Last 30 Days no previous admission in last 30 days   Patient currently being followed by outpatient case management? No   Patient currently receives any other outside agency services? No   Equipment Currently Used at Home none   Do you have any problems affording any of your prescribed medications? No   Is the patient taking medications as prescribed? yes   Does the patient have transportation home? Yes   Transportation Anticipated family or friend will provide   Does the patient receive services at the [facility]? No   Discharge Plan A Home   Discharge Plan B Home Health   Patient/Family in Agreement with Plan yes

## 2018-12-19 NOTE — HPI
This is a 63-year-old  female referred for evaluation of feeling unsteady and veering to the right while walking. She presented to the [facility] ED with symptoms onset that morning.  She did report being seen at [facility] that morning with the above symptoms including nausea.  A CT scan the brain done in emergency did not show any acute abnormality or bleed.  The patient has had a history a prior CVA, diabetes, and hypertension.     She subsequently had an MRI scan of brain that showed evidence of an acute left parietal deep white matter lacunar infarct.  In addition, there was extensive periventricular white matter changes from chronic microvascular disease.  An MRA of the brain that showed irregularity along the the P2 and P3 segments of the posterior cerebral arteries bilaterally, likely from stenotic disease.  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  Small caliber of the basilar artery and the intracranial vertebral arteries, likely congenital in nature.  No intracranial aneurysms.  MRA of the neck showed no significant stenosis.

## 2018-12-19 NOTE — ASSESSMENT & PLAN NOTE
Hold home blood pressure medication for now to allow for permissive hypertension with SBP range 160-220  -labetalol PRN for SBP > 220

## 2018-12-19 NOTE — PLAN OF CARE
Problem: Occupational Therapy Goal  Goal: Occupational Therapy Goal  Goals to be met by 1/19/19  1. Min A G/H standing at sink  2. CGA LBD (doff/don briefs/slacks)  3. Assess toilet hygiene  4. Assess bathing  5. Maintain midline trunk alignment with 10 minute activity seated EOB  Outcome: Ongoing (interventions implemented as appropriate)  OT evaluation completed with treatment to follow.  Recommend In-Pt Rehabilitation at discharge..  If discharged home she needs day-time supervision assist and an Out-Pt OT driving evaluation to ensure safety.  DME: [facility], 3-in-1 [employer]  SARAH Pierson 12/19/2018

## 2018-12-19 NOTE — NURSING
Pt back from mri, fsbg of 132, pt asking for breakfast to be warmed, pt/ot at bs for eval. No visible s/s of distress.

## 2018-12-19 NOTE — PLAN OF CARE
Problem: SLP Goal  Goal: SLP Goal  1. Pt will tolerate a regular diet with thin liquids with no overt s/s of airway threat or aspiration.   2. Pt will complete a 10 minute prospective memory task given an association cue with 100% accuracy.   3. Pt will sustain attention to task for 15 mins with no redirection required.   Outcome: Ongoing (interventions implemented as appropriate)  Partial bedside swallow and cognitive evaluation completed this date.     Comments: SLP to continue to follow for ongoing assessment of diet tolerance and evaluation of language and cognitive deficits.

## 2018-12-20 LAB
ALBUMIN SERPL BCP-MCNC: 3.4 G/DL
ALP SERPL-CCNC: 67 U/L
ALT SERPL W/O P-5'-P-CCNC: 61 U/L
ANION GAP SERPL CALC-SCNC: 10 MMOL/L
AST SERPL-CCNC: 45 U/L
BASOPHILS # BLD AUTO: 0.02 K/UL
BASOPHILS NFR BLD: 0.3 %
BILIRUB SERPL-MCNC: 1.2 MG/DL
BUN SERPL-MCNC: 8 MG/DL
CALCIUM SERPL-MCNC: 9.2 MG/DL
CHLORIDE SERPL-SCNC: 105 MMOL/L
CO2 SERPL-SCNC: 24 MMOL/L
CREAT SERPL-MCNC: 0.8 MG/DL
DIFFERENTIAL METHOD: ABNORMAL
EOSINOPHIL # BLD AUTO: 0.2 K/UL
EOSINOPHIL NFR BLD: 2 %
ERYTHROCYTE [DISTWIDTH] IN BLOOD BY AUTOMATED COUNT: 11.7 %
EST. GFR  (AFRICAN AMERICAN): >60 ML/MIN/1.73 M^2
EST. GFR  (NON AFRICAN AMERICAN): >60 ML/MIN/1.73 M^2
GLUCOSE SERPL-MCNC: 118 MG/DL
HCT VFR BLD AUTO: 39.5 %
HGB BLD-MCNC: 13.7 G/DL
LYMPHOCYTES # BLD AUTO: 3.6 K/UL
LYMPHOCYTES NFR BLD: 47.2 %
MAGNESIUM SERPL-MCNC: 2.1 MG/DL
MCH RBC QN AUTO: 32.2 PG
MCHC RBC AUTO-ENTMCNC: 34.7 G/DL
MCV RBC AUTO: 93 FL
MONOCYTES # BLD AUTO: 0.8 K/UL
MONOCYTES NFR BLD: 10.4 %
NEUTROPHILS # BLD AUTO: 3.1 K/UL
NEUTROPHILS NFR BLD: 40 %
PHOSPHATE SERPL-MCNC: 3.4 MG/DL
PLATELET # BLD AUTO: 249 K/UL
PMV BLD AUTO: 9.6 FL
POCT GLUCOSE: 130 MG/DL (ref 70–110)
POCT GLUCOSE: 139 MG/DL (ref 70–110)
POCT GLUCOSE: 164 MG/DL (ref 70–110)
POTASSIUM SERPL-SCNC: 3.3 MMOL/L
PROT SERPL-MCNC: 7.2 G/DL
RBC # BLD AUTO: 4.26 M/UL
SODIUM SERPL-SCNC: 139 MMOL/L
WBC # BLD AUTO: 7.63 K/UL

## 2018-12-20 PROCEDURE — 25000003 PHARM REV CODE 250: Performed by: NURSE PRACTITIONER

## 2018-12-20 PROCEDURE — 85025 COMPLETE CBC W/AUTO DIFF WBC: CPT

## 2018-12-20 PROCEDURE — 83735 ASSAY OF MAGNESIUM: CPT

## 2018-12-20 PROCEDURE — 11000001 HC ACUTE MED/SURG PRIVATE ROOM

## 2018-12-20 PROCEDURE — 92507 TX SP LANG VOICE COMM INDIV: CPT

## 2018-12-20 PROCEDURE — 97116 GAIT TRAINING THERAPY: CPT

## 2018-12-20 PROCEDURE — G8979 MOBILITY GOAL STATUS: HCPCS | Mod: CJ

## 2018-12-20 PROCEDURE — 94761 N-INVAS EAR/PLS OXIMETRY MLT: CPT

## 2018-12-20 PROCEDURE — 80053 COMPREHEN METABOLIC PANEL: CPT

## 2018-12-20 PROCEDURE — 97530 THERAPEUTIC ACTIVITIES: CPT

## 2018-12-20 PROCEDURE — 84100 ASSAY OF PHOSPHORUS: CPT

## 2018-12-20 PROCEDURE — 63600175 PHARM REV CODE 636 W HCPCS: Performed by: NURSE PRACTITIONER

## 2018-12-20 PROCEDURE — 99233 SBSQ HOSP IP/OBS HIGH 50: CPT | Mod: ,,, | Performed by: NURSE PRACTITIONER

## 2018-12-20 PROCEDURE — 36415 COLL VENOUS BLD VENIPUNCTURE: CPT

## 2018-12-20 PROCEDURE — G8978 MOBILITY CURRENT STATUS: HCPCS | Mod: CK

## 2018-12-20 RX ORDER — LISINOPRIL 10 MG/1
10 TABLET ORAL DAILY
Status: DISCONTINUED | OUTPATIENT
Start: 2018-12-20 | End: 2018-12-20

## 2018-12-20 RX ORDER — AMLODIPINE BESYLATE 5 MG/1
5 TABLET ORAL DAILY
Status: DISCONTINUED | OUTPATIENT
Start: 2018-12-20 | End: 2018-12-20

## 2018-12-20 RX ORDER — ATORVASTATIN CALCIUM 80 MG/1
80 TABLET, FILM COATED ORAL DAILY
Qty: 90 TABLET | Refills: 3 | Status: ON HOLD | OUTPATIENT
Start: 2018-12-20 | End: 2019-09-09 | Stop reason: SDUPTHER

## 2018-12-20 RX ORDER — LORATADINE 10 MG/1
10 TABLET ORAL DAILY PRN
COMMUNITY
End: 2019-09-11

## 2018-12-20 RX ORDER — LISINOPRIL 20 MG/1
40 TABLET ORAL DAILY
Status: DISCONTINUED | OUTPATIENT
Start: 2018-12-20 | End: 2018-12-21 | Stop reason: HOSPADM

## 2018-12-20 RX ORDER — LISINOPRIL 20 MG/1
40 TABLET ORAL DAILY
Status: DISCONTINUED | OUTPATIENT
Start: 2018-12-20 | End: 2018-12-20

## 2018-12-20 RX ORDER — CARVEDILOL 12.5 MG/1
25 TABLET ORAL 2 TIMES DAILY WITH MEALS
Status: DISCONTINUED | OUTPATIENT
Start: 2018-12-20 | End: 2018-12-21 | Stop reason: HOSPADM

## 2018-12-20 RX ORDER — NAPROXEN SODIUM 220 MG/1
81 TABLET, FILM COATED ORAL DAILY
Status: DISCONTINUED | OUTPATIENT
Start: 2018-12-21 | End: 2018-12-21 | Stop reason: HOSPADM

## 2018-12-20 RX ORDER — CARVEDILOL 25 MG/1
25 TABLET ORAL 2 TIMES DAILY WITH MEALS
COMMUNITY

## 2018-12-20 RX ORDER — TRAZODONE HYDROCHLORIDE 50 MG/1
50 TABLET ORAL NIGHTLY
COMMUNITY

## 2018-12-20 RX ADMIN — HEPARIN SODIUM 5000 UNITS: 5000 INJECTION, SOLUTION INTRAVENOUS; SUBCUTANEOUS at 09:12

## 2018-12-20 RX ADMIN — ACETAMINOPHEN 650 MG: 325 TABLET ORAL at 09:12

## 2018-12-20 RX ADMIN — ASPIRIN 325 MG ORAL TABLET 325 MG: 325 PILL ORAL at 09:12

## 2018-12-20 RX ADMIN — LISINOPRIL 40 MG: 20 TABLET ORAL at 12:12

## 2018-12-20 RX ADMIN — TRAZODONE HYDROCHLORIDE 50 MG: 50 TABLET ORAL at 09:12

## 2018-12-20 RX ADMIN — CARVEDILOL 25 MG: 12.5 TABLET, FILM COATED ORAL at 12:12

## 2018-12-20 RX ADMIN — HEPARIN SODIUM 5000 UNITS: 5000 INJECTION, SOLUTION INTRAVENOUS; SUBCUTANEOUS at 05:12

## 2018-12-20 RX ADMIN — ATORVASTATIN CALCIUM 80 MG: 20 TABLET, FILM COATED ORAL at 09:12

## 2018-12-20 RX ADMIN — HEPARIN SODIUM 5000 UNITS: 5000 INJECTION, SOLUTION INTRAVENOUS; SUBCUTANEOUS at 01:12

## 2018-12-20 NOTE — ASSESSMENT & PLAN NOTE
- restart home blood pressure medications: lisinopril 40 mg daily and carvedilol 25 mg twice daily  -labetalol PRN for SBP > 220

## 2018-12-20 NOTE — NURSING
DURING THIS SHIFT PATIENT HEART RATE DROP IN THE 40'S FOR SHORT PERIOD OF TIME.  PT ASSESSED BY NURSING STAFF.  PT DENIES CHEST PAIN, PAIN, OR DISCOMFORT.  DURING THIS ADMIT PATIENT'S HEART RATE HAS BEEN IN THE 50'S.  ON CALL NP (DAVID) NOTIFIED BY THIS NURSE (BRONSON).  NO NEW ORDERS GIVEN.  WILL CONTINUE TO MONITOR.

## 2018-12-20 NOTE — PROGRESS NOTES
[facility]  Hospital Medicine  Progress Note    Patient Name: Kaila Scott  MRN: [id number]  Patient Class: IP- Inpatient   Admission Date: 12/18/2018  Length of Stay: 1 days  Attending Physician: Tamara Rainey MD  Primary Care Provider: [facility]        Subjective:     Principal Problem:Lacunar ataxic hemiparesis of dominant side    HPI:  Ms. Kaila Scott is a 63 year old female, with a history of prior stroke without residual deficits, type 2 diabetes mellitus and hypertension who presented to the [facility] Department with complaints of dizziness, nausea and vomiting and unsteady gait that she first noted on Monday, December 17, 2018 at approximately 11:00 pm.  The patient states she was concerned she had food poisoning and she visited an outside hospital on Tuesday, December 18 where she was treated for nausea and vomiting and subsequently discharged home.    The patient reports that she continued to feel unsteady, continued falling to right side and with vision changes (double vision.)  She states she decided to come to [facility] ED for further evaluation. CT head completed on arrival to ED showed remote lacunar infarcts in the right corona radiata, bilateral basal ganglia and right thalamus appears similar to prior and without evidence of acute infarct, hemorrhage, or mass per CT report.  She denies fever/chills, chest pain, shortness of breath, abdominal pain, vomiting, diarrhea or urinary symptoms.  She admits to tobacco use (1 pack every 1.5 days). She denies alcohol or drug use.  She will be admitted under Observation for further evaluation of stroke like symptoms.        Hospital Course:  Ms. Kaila Scott was admitted for further stroke work up.  CT head on admit with remote lacunar infarcts in the right corona radiata, bilateral basal ganglia and right thalamus appears similar to prior and without evidence of acute infarct or hemorrhage.  MRI  "brain showed acute infarct left parietal lobe with tiny focus of "susceptibility changes" right caudate nucleus and lentiform nucleus and left thalamus.  MRA Head/Neck showed irregularity along the the P2 and P3 segments of the posterior cerebral arteries bilaterally, likely from stenotic disease.  Home blood pressure medications (lisinopril 40 mg daily, carvedilol 25 mg twice daily) were initially held for permissive hypertension and were restarted on December 20, 2018.  Aspirin and atrovastatin 80 mg daily was started for secondary stroke prevention. Transthoracic echo pending report.    Appreciate Neurology evaluatino and recommendations.  PT/OT/SLP consulted and recommended in-patient rehab.  The patient is awaiting acceptance at this time.      Interval History: Patient working well with physical/occupational therapy; balance issues improving and does not complain of double vision; continues with left sided mild gaze deviation and numbness to right lower extremity; awaiting Inpatient Rehab acceptance.    Review of Systems   Constitutional: Negative for chills and fever.   HENT: Negative for congestion and trouble swallowing.    Eyes: Negative for visual disturbance (double vision improved).   Respiratory: Negative for chest tightness and shortness of breath.    Cardiovascular: Negative for chest pain and palpitations.   Gastrointestinal: Negative for abdominal pain, diarrhea, nausea and vomiting.   Endocrine: Negative.    Genitourinary: Negative for flank pain, frequency, hematuria and urgency.   Musculoskeletal: Positive for gait problem (balance issued improving).   Skin: Negative.    Neurological: Positive for weakness and numbness (right lower leg). Negative for seizures, speech difficulty and headaches.   Hematological: Does not bruise/bleed easily.   Psychiatric/Behavioral: Negative.      Objective:     Vital Signs (Most Recent):  Temp: 98.3 °F (36.8 °C) (12/20/18 1256)  Pulse: 67 (12/20/18 1600)  Resp: " 18 (12/20/18 1256)  BP: (!) 183/87 (12/20/18 1256)  SpO2: 97 % (12/20/18 0855) Vital Signs (24h Range):  Temp:  [97.4 °F (36.3 °C)-99.1 °F (37.3 °C)] 98.3 °F (36.8 °C)  Pulse:  [56-81] 67  Resp:  [16-18] 18  SpO2:  [95 %-100 %] 97 %  BP: (149-189)/(66-92) 183/87     Weight: 84.7 kg (186 lb 11.2 oz)  Body mass index is 27.57 kg/m².    Intake/Output Summary (Last 24 hours) at 12/20/2018 1703  Last data filed at 12/20/2018 0547  Gross per 24 hour   Intake 240 ml   Output --   Net 240 ml      Physical Exam   Constitutional: She is oriented to person, place, and time. She appears well-developed and well-nourished. No distress.   HENT:   Head: Normocephalic and atraumatic.   Mouth/Throat: Oropharynx is clear and moist.   Eyes: Conjunctivae are normal. Pupils are equal, round, and reactive to light. Right eye exhibits no nystagmus. Left eye exhibits abnormal extraocular motion (gaze deviation to right that corrects with movement). Left eye exhibits no nystagmus.   Neck: Normal range of motion. Neck supple. No JVD present.   Cardiovascular: Normal rate, normal heart sounds and intact distal pulses. An irregular rhythm present.   No murmur heard.  Pulses:       Radial pulses are 2+ on the right side, and 2+ on the left side.        Dorsalis pedis pulses are 1+ on the right side, and 1+ on the left side.   Pulmonary/Chest: Effort normal. No respiratory distress. She has decreased breath sounds in the left lower field.   Abdominal: Soft. Bowel sounds are decreased. There is no tenderness.   Musculoskeletal: Normal range of motion.   Neurological: She is alert and oriented to person, place, and time. She has normal strength.   Skin: Skin is warm and dry. Capillary refill takes 2 to 3 seconds.   Psychiatric: She has a normal mood and affect. Her speech is normal and behavior is normal.     NIH Stroke Scale:  Interval: hospital day 2.  Level of Consciousness: 0 - alert  LOC Questions: 0 - answers both correctly  LOC Commands: 0 -  "performs both correctly  Best Gaze: 1 - partial gaze palsy  Visual: 0 - no visual loss  Facial Palsy: 0 - normal  Motor Left Arm: 0 - no drift  Motor Right Arm: 0 - no drift  Motor Left Le - no drift  Motor Right Le - no drift  Limb Ataxia: 1 - present in one limb  Sensory: 1 - mild to moderate loss  Best Language: 0 - no aphasia  Dysarthria: 0 - normal articulation  Extinction and Inattention: 0 - no neglect  NIH Stroke Scale Total: 3        Significant Labs:   CBC:   Recent Labs   Lab 18  0507 18  0520   WBC 7.86 10.17 7.63   HGB 15.2 13.5 13.7   HCT 44.2 39.5 39.5    234 249     CMP:   Recent Labs   Lab 18  0507 18  0520    139 139   K 3.6 3.3* 3.3*    106 105   CO2 26 23 24   * 107 118*   BUN 9 10 8   CREATININE 0.8 0.8 0.8   CALCIUM 9.9 9.8 9.2   PROT 8.0 7.3 7.2   ALBUMIN 3.7 3.4* 3.4*   BILITOT 0.8 1.0 1.2*   ALKPHOS 75 74 67   AST 44* 36 45*   ALT 66* 56* 61*   ANIONGAP 10 10 10   EGFRNONAA >60 >60 >60     All pertinent labs within the past 24 hours have been reviewed.    Significant Imaging: I have reviewed all pertinent imaging results/findings within the past 24 hours.    Assessment/Plan:      * Lacunar ataxic hemiparesis of dominant side    - NIHSS 3 today  - working well with PT/OT  - In-patient rehab acceptance pending       Cerebrovascular accident (CVA) due to thrombosis    - CT head on admit with remote lacunar infarcts in the right corona radiata, bilateral basal ganglia and right thalamus appears similar to prior.  There is no evidence of acute infarct, hemorrhage, or mass.   - MRI with acute infarct left parietal lobe with tiny focus of "susceptibility changes" right caudate nucleus and lentiform nucleus and left thalamus   - MRA with irregularity along the the P2 and P3 segments of the posterior cerebral arteries bilaterally, likely from stenotic disease  - continue stroke work up with transthoracic echo " pending, OT/PT/SLP and Neurology consult  - restart home blood pressure medications: lisinopril 40 mg daily and carvedilol 25 mg twice daily  - for secondary stroke prevention: aspirin 81 mg daily, discontinue home statin and start atorvastatin 80 mg daily  - continue fall precautions       Essential hypertension    - restart home blood pressure medications: lisinopril 40 mg daily and carvedilol 25 mg twice daily  -labetalol PRN for SBP > 220       Type 2 diabetes mellitus without complication    A1c 5.8 on admit  Low dose SSI  BG AC/HS       Hyperlipidemia    Discontinue pravastatin and atrovastatin 80 mg daily       GERD (gastroesophageal reflux disease)    - protonix 40 mg daily       Tobacco abuse    Continue to offer smoking cessation education; motivated to quit         VTE Risk Mitigation (From admission, onward)        Ordered     heparin (porcine) injection 5,000 Units  Every 8 hours      12/18/18 2206     IP VTE HIGH RISK PATIENT  Once      12/18/18 2206              Kathryn Ayala NP  Department of Hospital Medicine   [facility]

## 2018-12-20 NOTE — PT/OT/SLP PROGRESS
Occupational Therapy   Treatment    Name: Kaila Scott  MRN: [id number]  Admitting Diagnosis:  Lacunar ataxic hemiparesis of dominant side       Recommendations:     Discharge Recommendations: rehabilitation facility  Discharge Equipment Recommendations:  (TBD at next level of care)  Barriers to discharge:  Decreased caregiver support(pt lives alone)    Subjective     Pain/Comfort:  · Pain Rating 1: 0/10  · Pain Rating Post-Intervention 1: 0/10    Objective:     Communicated with: RN prior to session.  Patient found with call button in reach and telemetry upon OT entry to room.  Pt sitting EOB upon arrival to room.    General Precautions: Standard, aspiration, fall   Orthopedic Precautions:N/A   Braces: N/A     Occupational Performance:    Bed Mobility:    · Patient completed Scooting/Bridging with stand by assistance towards EOB.     Functional Mobility/Transfers:  · Patient completed Sit <> Stand Transfer with contact guard assistance  with  rolling walker x 10 trials from EOB  · BSC:  Min A x 1 trial from BSC with no AD; 1 instance of LOB noted requiring Min A to correct.  · Functional Mobility: Pt ambulated 25 ft within room with CGA-Min A and RW.  Pt demonstrated mild postural instability with three instances of LOB noted.  Pt reported feeling weak on her R side during task.  Cues provided for RW management with education provided on importance of maintaining RW in close proximity.      Activities of Daily Living:  · Grooming: contact guard assistance for washing hands while standing at sink.  Mild postural instability noted when pt turning body during task with one instance of LOB noted requiring Min A to correct.      [facility] 6 Click ADL: 20    Treatment & Education:  *Pt performed transfer to BSC without assistive device.  Postural instability noted so OT obtained RW to perform alternate trials of transfer.  Pt then performed sit to stand transfer from EOB x 8 trials to practice utilizing RW safely.   *Pt stood  to perform activities to address dynamic standing balance needed for ADLs in standing:   -Reaching in various directions and heights to touch hand of therapist: 1 set x 15 on each side; RW and SBA  -Marching in place: 2 sets x 15 reps  -Shoulder flexion: 1 set x 15 reps alternating R and L  -Shoulder abduction/adduction: 1 set x 15 reps on each side  *Pt sat EOB and performed activity to address bilateral coordination: Pt touched R elevated knee with L hand, then L elevated knee with R hand: 1 set x 15 reps on each side  *Pt ambulated to bathroom to perform grooming task in standing; see details above.  *Education and visual model provided on correct way to utilize RW when ambulating  *POC reviewed with pt     Patient left sitting EOB with call button in reach; family present  Education:    Assessment:     Kaila Scott is a 63 y.o. female with a medical diagnosis of Lacunar ataxic hemiparesis of dominant side.  She presents with the following performance deficits affecting function are weakness, impaired endurance, impaired self care skills, impaired functional mobilty, gait instability, impaired balance, decreased safety awareness, decreased coordination, decreased lower extremity function.  Pt demonstrated improvement with sitting balance performing activities with SBA while seated EOB.  During sit to stand transfer, toilet transfer to BSC, and grooming task in standing instances of LOB observed requiring Min A to correct.  Pt demonstrated mild postural sway when walking requiring CGA-Min A and RW.  Pt is highly motivated and is making significant progress towards goals.  She would continue to benefit from skilled OT services to address problems listed below and increase independence with ADLs.  She is able to tolerate three hours of therapy a day and remains strong candidate for rehab.         Rehab Prognosis:  Good; patient would benefit from acute skilled OT services to address these deficits and reach  maximum level of function.       Plan:     Patient to be seen 6 x/week to address the above listed problems via self-care/home management, therapeutic activities, therapeutic exercises, neuromuscular re-education  · Plan of Care Expires:    · Plan of Care Reviewed with: patient, sibling, family    This Plan of care has been discussed with the patient who was involved in its development and understands and is in agreement with the identified goals and treatment plan    GOALS:   Multidisciplinary Problems     Occupational Therapy Goals        Problem: Occupational Therapy Goal    Goal Priority Disciplines Outcome Interventions   Occupational Therapy Goal     OT, PT/OT Ongoing (interventions implemented as appropriate)    Description:  Goals to be met by 1/19/19  1. Min A G/H standing at sink MET 12/20/2018  2. CGA LBD (doff/don briefs/slacks)  3. Assess toilet hygiene  4. Assess bathing  5. Maintain midline trunk alignment with 10 minute activity seated EOB                      Time Tracking:     OT Date of Treatment: 12/20/18  OT Start Time: 1041  OT Stop Time: 1108  OT Total Time (min): 27 min    Billable Minutes:Therapeutic Activity 27    SARAH Clarke  12/20/2018

## 2018-12-20 NOTE — PLAN OF CARE
Problem: Adult Inpatient Plan of Care  Goal: Plan of Care Review  Outcome: Ongoing (interventions implemented as appropriate)  Pt received on room air with adequate saturation.

## 2018-12-20 NOTE — HOSPITAL COURSE
"Ms. Kaila Scott was admitted for further stroke work up.  CT head on admit with remote lacunar infarcts in the right corona radiata, bilateral basal ganglia and right thalamus appears similar to prior and without evidence of acute infarct or hemorrhage.  MRI brain showed acute infarct left parietal lobe with tiny focus of "susceptibility changes" right caudate nucleus and lentiform nucleus and left thalamus.  MRA Head/Neck showed irregularity along the the P2 and P3 segments of the posterior cerebral arteries bilaterally, likely from stenotic disease.  Home blood pressure medications (lisinopril 40 mg daily, carvedilol 25 mg twice daily) were initially held for permissive hypertension and were restarted on December 20, 2018.  Aspirin 81 mg daily and atrovastatin 80 mg daily was started for secondary stroke prevention. Transthoracic echo with EF 65% and mild left ventricular diastolic dysfunction.    Appreciate Neurology evaluation and recommendations.  PT/OT/SLP consulted and agree with recommendation for inpatient rehab.  The patient was educated on continued management of  her stroke risk factors and importance of smoking cessation. Given her history of prior stroke noted on CT brain, I have given her information on the potential for recurrent stroke as well symptoms of stroke.  She should follow up with Neurology within one month.   She was discharged in stable condition to [facility] inpatient rehabilitation facility.  "

## 2018-12-20 NOTE — PT/OT/SLP PROGRESS
Physical Therapy Treatment    Patient Name:  Kaila Scott   MRN:  [id number]    Recommendations:     Discharge Recommendations:  rehabilitation facility   Discharge Equipment Recommendations: (defer to IP rehab)   Barriers to discharge: Decreased caregiver support    Assessment:     Kaila Scott is a 63 y.o. female admitted with a medical diagnosis of Lacunar ataxic hemiparesis of dominant side.  She presents with the following impairments/functional limitations:  weakness, impaired endurance, impaired self care skills, impaired balance, gait instability, impaired functional mobilty, decreased safety awareness, decreased coordination, impaired coordination patient improved with mobility on this day and is highly motivated to participate and return to OF. Patient presents with decrease safety awareness, right sided weakness.     Patient was modified independent at home prior to this event for locomotion, ADLs, and IADLs including and there is expectation of returning to prior level of function to maintain independence avoiding readmission.     Pt's clinical condition meets full inpatient rehab criteria. Inpatient rehab will provide to total interdisciplinary treatment approach needed. Pt is at high risk of unplanned readmission due to fall risk, inability to self-administer medication, and lack of 24 hour caregiver in prior setting.      Pt is able to tolerate 3 hours of daily therapy. Pt is pleasant and motivated to return to prior level of function.       Rehab Prognosis: excellent ; patient would benefit from acute skilled PT services to address these deficits and reach maximum level of function.      Recent Surgery: * No surgery found *      Plan:     During this hospitalization, patient to be seen daily to address the above listed problems via gait training, therapeutic activities, neuromuscular re-education  · Plan of Care Expires:  01/19/19   Plan of Care Reviewed with: patient    Subjective      Communicated with nurse prior to session.  Patient found supine upon PT entry to room, agreeable to treatment.      Chief Complaint: patient complained of being in bed, not having lunch or water. PT spoke with SRIDEVI Alvarez to resolve the issue.   Patient comments/goals: I want to be back to my normal self   Pain/Comfort:  · Pain Rating 1: 0/10  · Pain Rating Post-Intervention 1: 0/10    Patients cultural, spiritual, Hindu conflicts given the current situation:   none stated     Objective:     Patient found with: telemetry     General Precautions: Standard, aspiration, fall   Orthopedic Precautions:N/A   Braces: N/A     Functional Mobility:  · Supine <> sit Modified Independent  · Sit to stand from bed with Rolling walker with CGA/Min A for hand placement and safety.   · Stand to sit onto bedside chair patient sat w/o hand placement or notification required moderate A   · Patient gait trained 60 feet with Rolling walker with CGA/Min A patient with right sided weakness and right foot clearance. Patient required constant verbal cues throughout.     AM-PAC 6 CLICK MOBILITY  Turning over in bed (including adjusting bedclothes, sheets and blankets)?: 4  Sitting down on and standing up from a chair with arms (e.g., wheelchair, bedside commode, etc.): 3  Moving from lying on back to sitting on the side of the bed?: 4  Moving to and from a bed to a chair (including a wheelchair)?: 3  Need to walk in hospital room?: 2  Climbing 3-5 steps with a railing?: 1  Basic Mobility Total Score: 17       Therapeutic Activities and Exercises:  Patient static stood with Rolling walker with CGA  Patient dynamic standing with [place]/Moderate A right lateral lean secondary to right sided weakness.   Assisted patient with making sure her lunch was delivered, water and ice notified nurse SRIDEVI Alvarez that patient wanted cranberry juice.     Patient left supine with all lines intact, call button in reach, bed alarm on, nurse notified.    GOALS:    Multidisciplinary Problems     Physical Therapy Goals        Problem: Physical Therapy Goal    Goal Priority Disciplines Outcome Goal Variances Interventions   Physical Therapy Goal     PT, PT/OT Ongoing (interventions implemented as appropriate)     Description:  Goals to be met by: -     Patient will increase functional independence with mobility by performin) Supine <>stand Mod I MET 18  2) Sit <>Stand with Supervision   3) Pt to ambulate at least 50 feet with Mod A with AD MET 18  4) Pt with maintain midline position during sitting and during transfers.   5) Pt will maintain standing with Min A.   6) Bed <>Chair with Supervision.                       Time Tracking:     PT Received On: 18  PT Start Time: 1350     PT Stop Time: 1420  PT Total Time (min): 30 min     Billable Minutes: Gait Training 10 and Therapeutic Activity 20    Treatment Type: Treatment  PT/PTA: PTA     PTA Visit Number: 1     Rae Pinto PTA  2018

## 2018-12-20 NOTE — ASSESSMENT & PLAN NOTE
"- CT head on admit with remote lacunar infarcts in the right corona radiata, bilateral basal ganglia and right thalamus appears similar to prior.  There is no evidence of acute infarct, hemorrhage, or mass.   - MRI with acute infarct left parietal lobe with tiny focus of "susceptibility changes" right caudate nucleus and lentiform nucleus and left thalamus   - MRA with irregularity along the the P2 and P3 segments of the posterior cerebral arteries bilaterally, likely from stenotic disease  - continue stroke work up with transthoracic echo pending, OT/PT/SLP and Neurology consult  - restart home blood pressure medications: lisinopril 40 mg daily and carvedilol 25 mg twice daily  - for secondary stroke prevention: aspirin 81 mg daily, discontinue home statin and start atorvastatin 80 mg daily  - continue fall precautions    "

## 2018-12-20 NOTE — PT/OT/SLP PROGRESS
Speech Language Pathology Treatment    Patient Name:  Kaila Scott   MRN:  [id number]  Admitting Diagnosis: Lacunar ataxic hemiparesis of dominant side    Recommendations:                 General Recommendations: SLP to follow up and monitor deficits s/p CVA    Diet recommendations:  Regular, Thin     Aspiration Precautions: 1 bite/sip at a time, Feed only when awake/alert, HOB to 90 degrees and Remain upright 30 minutes post meal     General Precautions: Standard    Subjective     · Pt awake, alert, watching television.  · Reports feeling much better today; however, still highly concerned with status upon d/c     Objective:     Has the patient been evaluated by SLP for swallowing?   Yes  Keep patient NPO? No   Current Respiratory Status: room air      MRI W/Wo contrast: Acute left parietal deep white matter lacunar type nonhemorrhagic infarction. Extensive periventricular white matter changes in the centrum from chronic microvascular ischemia.    Cognitive Communication: Pt awake, alert, agreeable to session, very pleasant. Oriented to name, , location, reason for hospitalization with 100% acc. Able to independently reference orientation board for exact date. Pt is distractible and tangential. Provided min verbal cuing, pt is able to sustain attention to 25 minute session with SLP. Pt able to answer open-ended questions regarding PMH appropriately; however, pt is verbose and perseverative regarding tangential topics. Requires cues to increase topic maintenance and forward progression of task. Pt with continued difficulties completing 10 minute prospective memory task this date. Pt attempted to complete task approx 7 minutes after initiation of timer and then required cues to alternate attention to buzzer after 10 minutes. Given cues to alternate attention to buzzer, pt was able to recall target information with 100% acc. Able to identify 20 items in a category during 60 sec generative naming task given min  "semantic cues (norm=18). Able to read large block letters without cues to scan from left to right across the page with 100% acc. Pt is impulsive and demonstrates dcr insight to deficits. Pt is highly motivated to return to PLOF, but dcr insight to deficits s/p CVA may impact safety at this time. 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     Assessment:     Kaila Scott is a 63 y.o. female with an SLP diagnosis of Cognitive Communication Impairment s/p CVA. She presents with dcr attention, dcr topic maintenance, and dcr insight to deficits. Speech therapy is recommended to continue at the next level of care.     Goals:   Multidisciplinary Problems     SLP Goals        Problem: SLP Goal    Goal Priority Disciplines Outcome   SLP Goal     SLP Ongoing (interventions implemented as appropriate)   Description:  1. Pt will tolerate a regular diet with thin liquids with no overt s/s of airway threat or aspiration.   2. Pt will complete a 10 minute prospective memory task given an association cue with 100% accuracy.   3. Pt will sustain attention to task for 15 mins with no redirection required.                     Plan:     · Patient to be seen:  4 x/week, 6 x/week   · Plan of Care expires:  12/29/18  · Plan of Care reviewed with:  patient   · SLP Follow-Up:  Yes       Discharge recommendations:  rehabilitation facility     Time Tracking:     SLP Treatment Date:   12/20/18  Speech Start Time:  0855  Speech Stop Time:  0920     Speech Total Time (min):  25 min    Billable Minutes: Speech Therapy Individual 25 minutes    Kiran Swanson CCC-SLP  12/20/2018  "

## 2018-12-20 NOTE — PLAN OF CARE
Problem: Occupational Therapy Goal  Goal: Occupational Therapy Goal  Goals to be met by 1/19/19  1. Min A G/H standing at sink MET 12/20/2018  2. CGA LBD (doff/don briefs/slacks)  3. Assess toilet hygiene  4. Assess bathing  5. Maintain midline trunk alignment with 10 minute activity seated EOB      Pt is making progress towards goals.  She remains strong candidate for rehab.    SARAH Clarke  12/20/2018

## 2018-12-20 NOTE — PLAN OF CARE
Problem: Physical Therapy Goal  Goal: Physical Therapy Goal  Goals to be met by: -     Patient will increase functional independence with mobility by performin) Supine <>stand Mod I MET 18  2) Sit <>Stand with Supervision   3) Pt to ambulate at least 50 feet with Mod A with AD MET 18  4) Pt with maintain midline position during sitting and during transfers.   5) Pt will maintain standing with Min A.   6) Bed <>Chair with Supervision.     Outcome: Ongoing (interventions implemented as appropriate)  Safety with transfers and decrease safety awareness. Patient is fast moving and impulsive. Right sided weakness Min A with Rolling walker

## 2018-12-20 NOTE — PLAN OF CARE
[facility]   Department of Hospital Medicine  [address] [place] [id number]  [phone] (phone)  [phone] (fax)      Facility Transfer Orders                        12/20/2018    Kaila Scott    Admit to: [facility]     Diagnoses:  Active Hospital Problems    Diagnosis  POA    *Lacunar ataxic hemiparesis of dominant side [G81.90]  Yes     Priority: 1 - High    Cerebrovascular accident (CVA) due to thrombosis [I63.9]  Yes     Priority: 1 - High    Essential hypertension [I10]  Yes     Priority: 2     Type 2 diabetes mellitus without complication [E11.9]  Yes     Priority: 3     Hyperlipidemia [E78.5]  Yes     Priority: 4     GERD (gastroesophageal reflux disease) [K21.9]  Yes     Priority: 5     Tobacco abuse [Z72.0]  Yes     Priority: 6       Resolved Hospital Problems   No resolved problems to display.       Allergies:Review of patient's allergies indicates:  No Known Allergies    Vitals:        Every shift      Diet: Diabetic/Cardiac 2000 calories           Activity:       - Ambulate with assistance to bathroom       Nursing Precautions:    - Aspiration precautions:             - Total assistance with meals            -  Upright 90 degrees befor during and after meals             -  Suction at bedside          - Fall precautions   - Seizure precaution       CONSULTS:      PT to evaluate and treat - five times a week     OT to evaluate and treat - five times a week     ST to evaluate and treat     Nutrition to evaluate and recommend diet      LABS:  Per facility protocol    DIABETES CARE:      Check blood sugar:       Fingerstick blood sugar a.m and p.m.    Fingerstick blood sugar AC and HS   Fingerstick blood sugar every 6 hours if unable to eat      Report CBG < 60 or > 400 to physician.                                          Insulin Sliding Scale          Glucose  Novolog Insulin Subcutaneous        0 - 60   Orange juice or  glucose tablet, hold insulin      No insulin   201-250  2 units   251-300  4 units   301-350  6 units   351-400  8 units   >400   10 units then call physician      Medications: Discontinue all previous medication orders, if any. See new list below.       Current Discharge Medication List      START taking these medications    Details   atorvastatin (LIPITOR) 80 MG tablet Take 1 tablet (80 mg total) by mouth once daily.  Qty: 90 tablet, Refills: 3         CONTINUE these medications which have NOT CHANGED    Details   carvedilol (COREG) 25 MG tablet Take 25 mg by mouth 2 (two) times daily with meals.      lisinopril (PRINIVIL,ZESTRIL) 20 MG tablet Take 40 mg by mouth once daily.       loratadine (CLARITIN) 10 mg tablet Take 10 mg by mouth daily as needed for Allergies.      metformin (GLUCOPHAGE) 500 MG tablet Take 1 tablet (500 mg total) by mouth 2 (two) times daily with meals.  Qty: 180 tablet, Refills: 3      methylPREDNISolone (MEDROL DOSEPACK) 4 mg tablet use as directed  Qty: 1 Package, Refills: 0    Associated Diagnoses: Sacroiliitis      traZODone (DESYREL) 50 MG tablet Take 50 mg by mouth every evening.      traMADol (ULTRAM) 50 mg tablet Take 1-2 tablets ( mg total) by mouth every 6 (six) hours as needed for Pain.  Qty: 16 tablet, Refills: 0         STOP taking these medications       HYDROcodone-acetaminophen (NORCO) 7.5-325 mg per tablet Comments:   Reason for Stopping:         ibuprofen (ADVIL,MOTRIN) 400 MG tablet Comments:   Reason for Stopping:         ondansetron (ZOFRAN-ODT) 8 MG TbDL Comments:   Reason for Stopping:         aspirin 325 MG tablet Comments:   Reason for Stopping:         cyclobenzaprine (FLEXERIL) 10 MG tablet Comments:   Reason for Stopping:         pravastatin (PRAVACHOL) 80 MG tablet Comments:   Reason for Stopping:              [START ON 12/21/2018] aspirin  81 mg Oral Daily    atorvastatin  80 mg Oral Daily    heparin (porcine)  5,000 Units Subcutaneous Q8H     lisinopril  40 mg Oral Daily    traZODone  50 mg Oral QHS               _________________________________  Kathryn Ayala NP  12/20/2018

## 2018-12-20 NOTE — SUBJECTIVE & OBJECTIVE
Interval History: Patient working well with physical/occupational therapy; balance issues improving and does not complain of double vision; continues with left sided mild gaze deviation and numbness to right lower extremity; awaiting Inpatient Rehab acceptance.    Review of Systems   Constitutional: Negative for chills and fever.   HENT: Negative for congestion and trouble swallowing.    Eyes: Negative for visual disturbance (double vision improved).   Respiratory: Negative for chest tightness and shortness of breath.    Cardiovascular: Negative for chest pain and palpitations.   Gastrointestinal: Negative for abdominal pain, diarrhea, nausea and vomiting.   Endocrine: Negative.    Genitourinary: Negative for flank pain, frequency, hematuria and urgency.   Musculoskeletal: Positive for gait problem (balance issued improving).   Skin: Negative.    Neurological: Positive for weakness and numbness (right lower leg). Negative for seizures, speech difficulty and headaches.   Hematological: Does not bruise/bleed easily.   Psychiatric/Behavioral: Negative.      Objective:     Vital Signs (Most Recent):  Temp: 98.3 °F (36.8 °C) (12/20/18 1256)  Pulse: 67 (12/20/18 1600)  Resp: 18 (12/20/18 1256)  BP: (!) 183/87 (12/20/18 1256)  SpO2: 97 % (12/20/18 0855) Vital Signs (24h Range):  Temp:  [97.4 °F (36.3 °C)-99.1 °F (37.3 °C)] 98.3 °F (36.8 °C)  Pulse:  [56-81] 67  Resp:  [16-18] 18  SpO2:  [95 %-100 %] 97 %  BP: (149-189)/(66-92) 183/87     Weight: 84.7 kg (186 lb 11.2 oz)  Body mass index is 27.57 kg/m².    Intake/Output Summary (Last 24 hours) at 12/20/2018 1703  Last data filed at 12/20/2018 0547  Gross per 24 hour   Intake 240 ml   Output --   Net 240 ml      Physical Exam   Constitutional: She is oriented to person, place, and time. She appears well-developed and well-nourished. No distress.   HENT:   Head: Normocephalic and atraumatic.   Mouth/Throat: Oropharynx is clear and moist.   Eyes: Conjunctivae are normal.  Pupils are equal, round, and reactive to light. Right eye exhibits no nystagmus. Left eye exhibits abnormal extraocular motion (gaze deviation to right that corrects with movement). Left eye exhibits no nystagmus.   Neck: Normal range of motion. Neck supple. No JVD present.   Cardiovascular: Normal rate, normal heart sounds and intact distal pulses. An irregular rhythm present.   No murmur heard.  Pulses:       Radial pulses are 2+ on the right side, and 2+ on the left side.        Dorsalis pedis pulses are 1+ on the right side, and 1+ on the left side.   Pulmonary/Chest: Effort normal. No respiratory distress. She has decreased breath sounds in the left lower field.   Abdominal: Soft. Bowel sounds are decreased. There is no tenderness.   Musculoskeletal: Normal range of motion.   Neurological: She is alert and oriented to person, place, and time. She has normal strength.   Skin: Skin is warm and dry. Capillary refill takes 2 to 3 seconds.   Psychiatric: She has a normal mood and affect. Her speech is normal and behavior is normal.     NIH Stroke Scale:  Interval: hospital day 2.  Level of Consciousness: 0 - alert  LOC Questions: 0 - answers both correctly  LOC Commands: 0 - performs both correctly  Best Gaze: 1 - partial gaze palsy  Visual: 0 - no visual loss  Facial Palsy: 0 - normal  Motor Left Arm: 0 - no drift  Motor Right Arm: 0 - no drift  Motor Left Le - no drift  Motor Right Le - no drift  Limb Ataxia: 1 - present in one limb  Sensory: 1 - mild to moderate loss  Best Language: 0 - no aphasia  Dysarthria: 0 - normal articulation  Extinction and Inattention: 0 - no neglect  NIH Stroke Scale Total: 3        Significant Labs:   CBC:   Recent Labs   Lab 18  0507 18  0520   WBC 7.86 10.17 7.63   HGB 15.2 13.5 13.7   HCT 44.2 39.5 39.5    234 249     CMP:   Recent Labs   Lab 18  0507 18  0520    139 139   K 3.6 3.3* 3.3*    106 105    CO2 26 23 24   * 107 118*   BUN 9 10 8   CREATININE 0.8 0.8 0.8   CALCIUM 9.9 9.8 9.2   PROT 8.0 7.3 7.2   ALBUMIN 3.7 3.4* 3.4*   BILITOT 0.8 1.0 1.2*   ALKPHOS 75 74 67   AST 44* 36 45*   ALT 66* 56* 61*   ANIONGAP 10 10 10   EGFRNONAA >60 >60 >60     All pertinent labs within the past 24 hours have been reviewed.    Significant Imaging: I have reviewed all pertinent imaging results/findings within the past 24 hours.

## 2018-12-20 NOTE — PLAN OF CARE
Problem: SLP Goal  Goal: SLP Goal  1. Pt will tolerate a regular diet with thin liquids with no overt s/s of airway threat or aspiration.   2. Pt will complete a 10 minute prospective memory task given an association cue with 100% accuracy.   3. Pt will sustain attention to task for 15 mins with no redirection required.    Outcome: Ongoing (interventions implemented as appropriate)  Pt seen for ongoing assessment and remediation of cognitive communication status.     Comments: Speech therapy to follow up 4-6x per week for ongoing assessment and remediation of cognitive communication deficits. Speech therapy recommended to continue at the next level of care.

## 2018-12-20 NOTE — PLAN OF CARE
Problem: Adult Inpatient Plan of Care  Goal: Plan of Care Review  Outcome: Ongoing (interventions implemented as appropriate)  Plan of Care reviewed with patient. Pt ambulated in the montalvo with assist. Pt has Rt sided weakness noted. Pt free from falls or injury. Denies any c/o discomfort. Safety maintained. Bed in lowest position and locked. Purposeful rounding done. Call light in reach.

## 2018-12-21 VITALS
HEIGHT: 69 IN | OXYGEN SATURATION: 99 % | HEART RATE: 56 BPM | BODY MASS INDEX: 27.65 KG/M2 | RESPIRATION RATE: 18 BRPM | SYSTOLIC BLOOD PRESSURE: 152 MMHG | TEMPERATURE: 98 F | DIASTOLIC BLOOD PRESSURE: 66 MMHG | WEIGHT: 186.69 LBS

## 2018-12-21 LAB
ALBUMIN SERPL BCP-MCNC: 3.5 G/DL
ALP SERPL-CCNC: 77 U/L
ALT SERPL W/O P-5'-P-CCNC: 69 U/L
ANION GAP SERPL CALC-SCNC: 11 MMOL/L
AST SERPL-CCNC: 54 U/L
BASOPHILS # BLD AUTO: 0.01 K/UL
BASOPHILS NFR BLD: 0.1 %
BILIRUB SERPL-MCNC: 0.9 MG/DL
BUN SERPL-MCNC: 9 MG/DL
CALCIUM SERPL-MCNC: 9.5 MG/DL
CHLORIDE SERPL-SCNC: 106 MMOL/L
CO2 SERPL-SCNC: 23 MMOL/L
CREAT SERPL-MCNC: 0.7 MG/DL
DIFFERENTIAL METHOD: ABNORMAL
EOSINOPHIL # BLD AUTO: 0.2 K/UL
EOSINOPHIL NFR BLD: 2 %
ERYTHROCYTE [DISTWIDTH] IN BLOOD BY AUTOMATED COUNT: 11.5 %
EST. GFR  (AFRICAN AMERICAN): >60 ML/MIN/1.73 M^2
EST. GFR  (NON AFRICAN AMERICAN): >60 ML/MIN/1.73 M^2
GLUCOSE SERPL-MCNC: 123 MG/DL
HCT VFR BLD AUTO: 40.2 %
HGB BLD-MCNC: 14.1 G/DL
LYMPHOCYTES # BLD AUTO: 3.8 K/UL
LYMPHOCYTES NFR BLD: 47.3 %
MAGNESIUM SERPL-MCNC: 2.3 MG/DL
MCH RBC QN AUTO: 32.4 PG
MCHC RBC AUTO-ENTMCNC: 35.1 G/DL
MCV RBC AUTO: 92 FL
MONOCYTES # BLD AUTO: 0.9 K/UL
MONOCYTES NFR BLD: 11.6 %
NEUTROPHILS # BLD AUTO: 3.2 K/UL
NEUTROPHILS NFR BLD: 38.9 %
PHOSPHATE SERPL-MCNC: 3.7 MG/DL
PLATELET # BLD AUTO: 246 K/UL
PMV BLD AUTO: 9.7 FL
POCT GLUCOSE: 149 MG/DL (ref 70–110)
POCT GLUCOSE: 207 MG/DL (ref 70–110)
POTASSIUM SERPL-SCNC: 3.7 MMOL/L
PROT SERPL-MCNC: 7.4 G/DL
RBC # BLD AUTO: 4.35 M/UL
SODIUM SERPL-SCNC: 140 MMOL/L
WBC # BLD AUTO: 8.1 K/UL

## 2018-12-21 PROCEDURE — 94761 N-INVAS EAR/PLS OXIMETRY MLT: CPT

## 2018-12-21 PROCEDURE — 36415 COLL VENOUS BLD VENIPUNCTURE: CPT

## 2018-12-21 PROCEDURE — 97112 NEUROMUSCULAR REEDUCATION: CPT

## 2018-12-21 PROCEDURE — G8978 MOBILITY CURRENT STATUS: HCPCS | Mod: CK

## 2018-12-21 PROCEDURE — G8979 MOBILITY GOAL STATUS: HCPCS | Mod: CJ

## 2018-12-21 PROCEDURE — 25000003 PHARM REV CODE 250: Performed by: NURSE PRACTITIONER

## 2018-12-21 PROCEDURE — 85025 COMPLETE CBC W/AUTO DIFF WBC: CPT

## 2018-12-21 PROCEDURE — 63600175 PHARM REV CODE 636 W HCPCS: Performed by: NURSE PRACTITIONER

## 2018-12-21 PROCEDURE — G8980 MOBILITY D/C STATUS: HCPCS | Mod: CK

## 2018-12-21 PROCEDURE — 99223 1ST HOSP IP/OBS HIGH 75: CPT | Mod: ,,, | Performed by: PHYSICAL MEDICINE & REHABILITATION

## 2018-12-21 PROCEDURE — 99239 HOSP IP/OBS DSCHRG MGMT >30: CPT | Mod: ,,, | Performed by: NURSE PRACTITIONER

## 2018-12-21 PROCEDURE — 83735 ASSAY OF MAGNESIUM: CPT

## 2018-12-21 PROCEDURE — 97530 THERAPEUTIC ACTIVITIES: CPT

## 2018-12-21 PROCEDURE — 80053 COMPREHEN METABOLIC PANEL: CPT

## 2018-12-21 PROCEDURE — 84100 ASSAY OF PHOSPHORUS: CPT

## 2018-12-21 RX ORDER — CARVEDILOL 25 MG/1
25 TABLET ORAL 2 TIMES DAILY WITH MEALS
Qty: 60 TABLET | Refills: 11 | Status: ON HOLD | OUTPATIENT
Start: 2018-12-21 | End: 2019-09-09 | Stop reason: HOSPADM

## 2018-12-21 RX ADMIN — ATORVASTATIN CALCIUM 80 MG: 20 TABLET, FILM COATED ORAL at 08:12

## 2018-12-21 RX ADMIN — ASPIRIN 81 MG CHEWABLE TABLET 81 MG: 81 TABLET CHEWABLE at 08:12

## 2018-12-21 RX ADMIN — CARVEDILOL 25 MG: 12.5 TABLET, FILM COATED ORAL at 08:12

## 2018-12-21 RX ADMIN — HEPARIN SODIUM 5000 UNITS: 5000 INJECTION, SOLUTION INTRAVENOUS; SUBCUTANEOUS at 05:12

## 2018-12-21 RX ADMIN — LISINOPRIL 40 MG: 20 TABLET ORAL at 08:12

## 2018-12-21 NOTE — PROGRESS NOTES
SW met with pt at bedside and informed of the denial for inpt rehab by [facility] and that a peer to peer has been scheduled for today aroung 3pm.  SW explained in detail the auth process from submission of medical records to the insurance, the review process by insurance and the appeals process, if denied.  After peer to peer call between our MD and the [facility]'s MD the denial can be overturned or upheld and the steps afterwards. Pt acknowledged understanding.    SW will f/u with pt after peer to peer decision has be rendered.

## 2018-12-21 NOTE — PLAN OF CARE
Problem: Physical Therapy Goal  Goal: Physical Therapy Goal  Goals to be met by: -     Patient will increase functional independence with mobility by performin) Supine <>stand Mod I MET 18  2) Sit <>Stand with Supervision   3) Pt to ambulate at least 50 feet with Mod A with AD MET 18  4) Pt with maintain midline position during sitting and during transfers.   5) Pt will maintain standing with Min A.   6) Bed <>Chair with Supervision.      Outcome: Ongoing (interventions implemented as appropriate)  CM noted pt and PT that peer to peer review done and reversed denial for IPR.  Pt scheduled for transfer to [facility] today.  Pt brought to rehab gym.  Worked in pbars with full length mirror on midline reorientation in sitting and standing -wt shift and wt bearing , balance activities and safe transfers.  Pt labile during treatment  REC:  IPR  DME- TBD

## 2018-12-21 NOTE — PLAN OF CARE
Raven from [facility] reports they are ready to receive patient - report can be called to [phone] - orders faxed to [phone] - ADT-30 completed requesting transport for 4pm - SRIDEVI Alvarez updated - patient aware      12/21/18 6121   Final Note   Assessment Type Final Discharge Note   Anticipated Discharge Disposition Rehab   What phone number can be called within the next 1-3 days to see how you are doing after discharge? [phone]   Discharge plans and expectations educations in teach back method with documentation complete? Yes   Right Care Referral Info   Post Acute Recommendation IRF   Facility Name [facility]

## 2018-12-21 NOTE — PLAN OF CARE
Peer to peer complete & [facility] has approved IRF auth - spoke with Raven at [facility] - she will contact  with further instructions after auth obtained from [facility] - patient updated

## 2018-12-21 NOTE — PLAN OF CARE
Patient denied IRF auth by [facility] - recommend SNF placement or complete peer to peer - spoke with Kathryn MUSE who requested peer to peer be arranged - spoke with Nicolette at [facility] - Dr Loraine Jaramillo will be in contact with Kathryn MUSE before 3pm today - patient updated

## 2018-12-21 NOTE — PLAN OF CARE
Problem: Adult Inpatient Plan of Care  Goal: Plan of Care Review  Outcome: Ongoing (interventions implemented as appropriate)  Plan of care reviewed with patient and all questions answer. Pt remains free from fall, injury, and skin breakdown. Pt neurovascular checks are intact. Positions self independently. Purposeful rounding done. Telemetry monitoring throughout the shift. Patient has call light within reach, bed brakes lock, side rails up x2, bed in low position, and nonskid socks on. Patient lying in bed in no distress. Will continue to monitor.

## 2018-12-21 NOTE — PT/OT/SLP PROGRESS
Physical Therapy Treatment    Patient Name:  Kaila Scott   MRN:  [id number]    Recommendations:     Discharge Recommendations:  rehabilitation facility   Discharge Equipment Recommendations: (TBD)   Barriers to discharge: Decreased caregiver support    Assessment:     Kaila Scott is a 63 y.o. female admitted with a medical diagnosis of Lacunar ataxic hemiparesis of dominant side.  She presents with the following impairments/functional limitations:  weakness, impaired endurance, impaired functional mobilty, gait instability, impaired balance, impaired self care skills, decreased lower extremity function, decreased upper extremity function, abnormal tone, decreased safety awareness, impaired coordination   Despite co-morbidities including DM type 2 , patient was modified independent at home prior to this event for locomotion without AD, ADLs, and IADLs including driving , working  and there is expectation of returning to prior level of function to maintain independence avoiding readmission.     Pt's clinical condition meets full inpatient rehab criteria. Inpatient rehab will provide to total interdisciplinary treatment approach needed. Pt is at high risk of unplanned readmission due to fall risk, inability to self-administer medication, and lack of 24 hour caregiver in prior setting.      Pt is able to tolerate 3 hours of daily therapy. Pt is pleasant and is highly motivated to return to prior level of function.         Rehab Prognosis: Good; patient would benefit from acute skilled PT services to address these deficits and reach maximum level of function.    Recent Surgery: * No surgery found *      Plan:     During this hospitalization, patient to be seen daily to address the identified rehab impairments via gait training, therapeutic activities, therapeutic exercises, neuromuscular re-education and progress toward the following goals:    · Plan of Care Expires:  01/19/19    Subjective     Chief Complaint: cant  do anything I used to do  Patient/Family Comments/goals: I need to go to rehab   Pain/Comfort:  · Pain Rating 1: 0/10  · Pain Rating Post-Intervention 1: 0/10      Objective:     Communicated with nurse prior to session.  Patient found all lines intact, call button in reach and nurse notified peripheral IV, telemetry  upon PT entry to room.     General Precautions: Standard, fall, aspiration   Orthopedic Precautions:N/A   Braces: N/A     Functional Mobility:  · Bed Mobility:     · Supine to Sit: modified independence  · Sit to Supine: modified independence  · Transfers:     · Sit to Stand:  contact guard assistance with p bars  · Gait: amb 10' x 4 in  pbars with CGA with mirror in front   · Balance: poor standing       AM-PAC 6 CLICK MOBILITY  Turning over in bed (including adjusting bedclothes, sheets and blankets)?: 4  Sitting down on and standing up from a chair with arms (e.g., wheelchair, bedside commode, etc.): 3  Moving from lying on back to sitting on the side of the bed?: 4  Moving to and from a bed to a chair (including a wheelchair)?: 3  Need to walk in hospital room?: 2  Climbing 3-5 steps with a railing?: 1  Basic Mobility Total Score: 17       Therapeutic Activities and Exercises:     Pt brought to rehab gym in chair.  Worked in pbars with full length mirror on midline reorientation in sitting and standing -wt shift and wt bearing , balance activities( sidestepping) and safe transfers.  Pt labile during treatment-laughing and then began to cry.      Patient left HOB elevated with all lines intact, call button in reach, bed alarm on and nurse notified..    GOALS:   Multidisciplinary Problems     Physical Therapy Goals        Problem: Physical Therapy Goal    Goal Priority Disciplines Outcome Goal Variances Interventions   Physical Therapy Goal     PT, PT/OT Ongoing (interventions implemented as appropriate)     Description:  Goals to be met by: 1-19/19     Patient will increase functional independence  with mobility by performin) Supine <>stand Mod I MET 18  2) Sit <>Stand with Supervision   3) Pt to ambulate at least 50 feet with Mod A with AD MET 18  4) Pt with maintain midline position during sitting and during transfers.   5) Pt will maintain standing with Min A.   6) Bed <>Chair with Supervision.                       Time Tracking:     PT Received On: 18  PT Start Time: 1350     PT Stop Time: 1430  PT Total Time (min): 40 min     Billable Minutes: Therapeutic Activity 14 and Neuromuscular Re-education 26    Treatment Type: Treatment  PT/PTA: PT     PTA Visit Number: 0     Delmis Erickson, PT  2018

## 2018-12-21 NOTE — PT/OT/SLP PROGRESS
Occupational Therapy  Not Seen Note    Patient Name:  Kaila Scott   MRN:  [id number]    Patient not seen today secondary to Patient unwilling to participate due to hospital discharge-transfer to IRF this afternoon. No follow-up planned..    SARAH Pierson  12/21/2018

## 2018-12-21 NOTE — PT/OT/SLP PROGRESS
Speech Language Pathology      Kaila Scott  MRN: [id number]    Failed attempt x2 this date. Pt with PT at first attempt, then bathing in prep for transport to [facility].     High degree of distractibility, dcr selective and divided attention, impulsivity, disinhibition, tangentiality and lability noted when observed with PT and during brief conversation in her room while she was getting ready for transport.    Recommend a more complete cognitive communication evaluation in an IPR setting to improve attention, recent memory, pragmatics.        Justine Noel, VIV-SLP

## 2018-12-22 NOTE — PT/OT/SLP DISCHARGE
Physical Therapy Discharge Summary    Name: Kaila Scott  MRN: [id number]   Principal Problem: Lacunar ataxic hemiparesis of dominant side     Patient Discharged from acute Physical Therapy on 18.  Please refer to prior PT noted date on 18 for functional status.     Assessment:     Patient appropriate for care in another setting.    Objective:     GOALS:   Multidisciplinary Problems     Physical Therapy Goals        Problem: Physical Therapy Goal    Goal Priority Disciplines Outcome Goal Variances Interventions   Physical Therapy Goal     PT, PT/OT Ongoing (interventions implemented as appropriate)     Description:  Goals to be met by: -     Patient will increase functional independence with mobility by performin) Supine <>stand Mod I MET 18  2) Sit <>Stand with Supervision   3) Pt to ambulate at least 50 feet with Mod A with AD MET 18  4) Pt with maintain midline position during sitting and during transfers.   5) Pt will maintain standing with Min A.   6) Bed <>Chair with Supervision.                       Reasons for Discontinuation of Therapy Services  Transfer to alternate level of care.      Plan:     Patient Discharged to: Inpatient Rehab.    PT of record not available for documentation.      Saumya Crain, PT  2018

## 2018-12-22 NOTE — DISCHARGE SUMMARY
[facility]  Hospital Medicine  Discharge Summary      Patient Name: Kaila Scott  MRN: [id number]  Admission Date: 12/18/2018  Hospital Length of Stay: 2 days  Discharge Date and Time: 12/21/2018  5:38 PM  Attending Physician:  Tamara Rainey MD   Discharging Provider: Kathryn Ayala NP  Primary Care Provider: [facility]      HPI:   Ms. Kaila Scott is a 63 year old female, with a history of prior stroke without residual deficits, type 2 diabetes mellitus and hypertension who presented to the [facility] with complaints of dizziness, nausea and vomiting and unsteady gait that she first noted on Monday, December 17, 2018 at approximately 11:00 pm.  The patient states she was concerned she had food poisoning and she visited an outside hospital on Tuesday, December 18 where she was treated for nausea and vomiting and subsequently discharged home.    The patient reports that she continued to feel unsteady, continued falling to right side and with vision changes (double vision.)  She states she decided to come to [facility] ED for further evaluation. CT head completed on arrival to ED showed remote lacunar infarcts in the right corona radiata, bilateral basal ganglia and right thalamus appears similar to prior and without evidence of acute infarct, hemorrhage, or mass per CT report.  She denies fever/chills, chest pain, shortness of breath, abdominal pain, vomiting, diarrhea or urinary symptoms.  She admits to tobacco use (1 pack every 1.5 days). She denies alcohol or drug use.  She will be admitted under Observation for further evaluation of stroke like symptoms.         Hospital Course:   Ms. Kaila Scott was admitted for further stroke work up.  CT head on admit with remote lacunar infarcts in the right corona radiata, bilateral basal ganglia and right thalamus appears similar to prior and without evidence of acute infarct or hemorrhage.  MRI brain  "showed acute infarct left parietal lobe with tiny focus of "susceptibility changes" right caudate nucleus and lentiform nucleus and left thalamus.  MRA Head/Neck showed irregularity along the the P2 and P3 segments of the posterior cerebral arteries bilaterally, likely from stenotic disease.  Home blood pressure medications (lisinopril 40 mg daily, carvedilol 25 mg twice daily) were initially held for permissive hypertension and were restarted on December 20, 2018.  Aspirin 81 mg daily and atrovastatin 80 mg daily was started for secondary stroke prevention. Transthoracic echo with EF 65% and mild left ventricular diastolic dysfunction.    Appreciate Neurology evaluation and recommendations.  PT/OT/SLP consulted and agree with recommendation for inpatient rehab.  The patient was educated on continued management of  her stroke risk factors and importance of smoking cessation. Given her history of prior stroke noted on CT brain, I have given her information on the potential for recurrent stroke as well symptoms of stroke.  She should follow up with Neurology within one month.   She was discharged in stable condition to [facility] inpatient rehabilitation facility.        Final Active Diagnoses:    Diagnosis Date Noted POA    PRINCIPAL PROBLEM:  Lacunar ataxic hemiparesis of dominant side [G81.90]  Yes    Cerebrovascular accident (CVA) due to thrombosis [I63.9] 12/18/2018 Yes    Essential hypertension [I10]  Yes    Type 2 diabetes mellitus without complication [E11.9]  Yes    Hyperlipidemia [E78.5] 12/18/2014 Yes    GERD (gastroesophageal reflux disease) [K21.9] 12/18/2014 Yes    Tobacco abuse [Z72.0] 12/19/2014 Yes      Problems Resolved During this Admission:       Discharged Condition: stable    Disposition: Rehab Facility    Follow Up:  Follow-up Information     Schedule an appointment as soon as possible for a visit with [facility].    Specialties:  Behavioral Health, " Psychiatry  Why:  As needed  and for follow up after hospital and inpatient rehab discharge  Contact information:  [address]  [facility] [address]  [phone]                 Patient Instructions:      Diet diabetic     Diet Cardiac     Diet Cardiac     Notify your health care provider if you experience any of the following:  temperature >100.4     Notify your health care provider if you experience any of the following:  persistent dizziness, light-headedness, or visual disturbances     Notify your health care provider if you experience any of the following:  increased confusion or weakness     Notify your health care provider if you experience any of the following:  difficulty breathing or increased cough     Activity as tolerated     Activity as tolerated       Significant Diagnostic Studies: Labs:   CMP   Recent Labs   Lab 12/21/18  0427      K 3.7      CO2 23   *   BUN 9   CREATININE 0.7   CALCIUM 9.5   PROT 7.4   ALBUMIN 3.5   BILITOT 0.9   ALKPHOS 77   AST 54*   ALT 69*   ANIONGAP 11   ESTGFRAFRICA >60   EGFRNONAA >60   , CBC   Recent Labs   Lab 12/21/18 0427   WBC 8.10   HGB 14.1   HCT 40.2      , Lipid Panel   Lab Results   Component Value Date    CHOL 208 (H) 12/19/2018    HDL 54 12/19/2018    LDLCALC 133.8 12/19/2018    TRIG 101 12/19/2018    CHOLHDL 26.0 12/19/2018   , A1C:   Recent Labs   Lab 12/18/18  1910   HGBA1C 5.8*    and All labs within the past 24 hours have been reviewed    Pending Diagnostic Studies:     None         Medications:  Reconciled Home Medications:      Medication List      START taking these medications    atorvastatin 80 MG tablet  Commonly known as:  LIPITOR  Take 1 tablet (80 mg total) by mouth once daily.        CHANGE how you take these medications    * carvedilol 25 MG tablet  Commonly known as:  COREG  Take 25 mg by mouth 2 (two) times daily with meals.  What changed:  Another medication with the same name was changed. Make sure you  understand how and when to take each.     * carvedilol 25 MG tablet  Commonly known as:  COREG  Take 1 tablet (25 mg total) by mouth 2 (two) times daily with meals.  What changed:  when to take this     metFORMIN 500 MG tablet  Commonly known as:  GLUCOPHAGE  Take 1 tablet (500 mg total) by mouth 2 (two) times daily with meals.  What changed:  when to take this         * This list has 2 medication(s) that are the same as other medications prescribed for you. Read the directions carefully, and ask your doctor or other care provider to review them with you.            CONTINUE taking these medications    lisinopril 20 MG tablet  Commonly known as:  PRINIVIL,ZESTRIL  Take 40 mg by mouth once daily.     loratadine 10 mg tablet  Commonly known as:  CLARITIN  Take 10 mg by mouth daily as needed for Allergies.     methylPREDNISolone 4 mg tablet  Commonly known as:  MEDROL DOSEPACK  use as directed     traMADol 50 mg tablet  Commonly known as:  ULTRAM  Take 1-2 tablets ( mg total) by mouth every 6 (six) hours as needed for Pain.     traZODone 50 MG tablet  Commonly known as:  DESYREL  Take 50 mg by mouth every evening.        STOP taking these medications    aspirin 325 MG tablet     cyclobenzaprine 10 MG tablet  Commonly known as:  FLEXERIL     HYDROcodone-acetaminophen 7.5-325 mg per tablet  Commonly known as:  NORCO     ibuprofen 400 MG tablet  Commonly known as:  ADVIL,MOTRIN     ondansetron 8 MG Tbdl  Commonly known as:  ZOFRAN-ODT     pravastatin 80 MG tablet  Commonly known as:  PRAVACHOL             Time spent on the discharge of patient: >30 minutes  Patient was seen and examined on the date of discharge and determined to be suitable for discharge.         Kathryn Ayala NP  Department of Hospital Medicine  [facility]

## 2018-12-22 NOTE — PT/OT/SLP DISCHARGE
Occupational Therapy Discharge Summary    Kaila Scott  MRN: [id number]   Principal Problem: Lacunar ataxic hemiparesis of dominant side      Patient Discharged from acute Occupational Therapy on 12/21/18.  Please refer to prior OT note dated 12/20/18 for functional status.    Assessment:      Patient appropriate for care in another setting.    Objective:     GOALS:   Multidisciplinary Problems     Occupational Therapy Goals        Problem: Occupational Therapy Goal    Goal Priority Disciplines Outcome Interventions   Occupational Therapy Goal     OT, PT/OT Ongoing (interventions implemented as appropriate)    Description:  Goals to be met by 1/19/19  1. Min A G/H standing at sink MET 12/20/2018  2. CGA LBD (doff/don briefs/slacks)  3. Assess toilet hygiene  4. Assess bathing  5. Maintain midline trunk alignment with 10 minute activity seated EOB                      Reasons for Discontinuation of Therapy Services  Transfer to alternate level of care.      Plan:     Patient Discharged to: Inpatient Rehab    SARAH Pierson  12/22/2018

## 2018-12-28 DIAGNOSIS — I63.9 CEREBROVASCULAR ACCIDENT: Primary | ICD-10-CM

## 2019-01-25 RX ORDER — LISINOPRIL 40 MG/1
TABLET ORAL
Qty: 30 TABLET | Refills: 0 | OUTPATIENT
Start: 2019-01-25

## 2019-01-25 RX ORDER — AMLODIPINE BESYLATE 10 MG/1
TABLET ORAL
Qty: 30 TABLET | Refills: 0 | OUTPATIENT
Start: 2019-01-25

## 2019-02-22 RX ORDER — CARVEDILOL 12.5 MG/1
TABLET ORAL
Qty: 60 TABLET | Refills: 0 | OUTPATIENT
Start: 2019-02-22

## 2019-04-15 RX ORDER — ATORVASTATIN CALCIUM 80 MG/1
TABLET, FILM COATED ORAL
Qty: 90 TABLET | Refills: 0 | OUTPATIENT
Start: 2019-04-15

## 2019-09-08 ENCOUNTER — HOSPITAL ENCOUNTER (INPATIENT)
Facility: HOSPITAL | Age: 64
LOS: 1 days | Discharge: HOME HEALTH | DRG: 064 | End: 2019-09-09
Attending: EMERGENCY MEDICINE | Admitting: PSYCHIATRY & NEUROLOGY
Payer: MEDICARE

## 2019-09-08 DIAGNOSIS — I63.312 CEREBRAL INFARCTION DUE TO THROMBOSIS OF LEFT MIDDLE CEREBRAL ARTERY: ICD-10-CM

## 2019-09-08 DIAGNOSIS — R47.01 APHASIA: ICD-10-CM

## 2019-09-08 DIAGNOSIS — I65.23 BILATERAL CAROTID ARTERY STENOSIS: ICD-10-CM

## 2019-09-08 DIAGNOSIS — R47.81 SLURRING OF SPEECH: ICD-10-CM

## 2019-09-08 DIAGNOSIS — E11.9 TYPE 2 DIABETES MELLITUS WITHOUT COMPLICATION, WITHOUT LONG-TERM CURRENT USE OF INSULIN: ICD-10-CM

## 2019-09-08 DIAGNOSIS — R47.1 DYSARTHRIA: ICD-10-CM

## 2019-09-08 DIAGNOSIS — I63.9 STROKE: Primary | ICD-10-CM

## 2019-09-08 PROBLEM — E87.6 HYPOKALEMIA: Status: ACTIVE | Noted: 2019-09-08

## 2019-09-08 LAB
ALBUMIN SERPL BCP-MCNC: 3.5 G/DL (ref 3.5–5.2)
ALP SERPL-CCNC: 55 U/L (ref 55–135)
ALT SERPL W/O P-5'-P-CCNC: 11 U/L (ref 10–44)
ANION GAP SERPL CALC-SCNC: 10 MMOL/L (ref 8–16)
AST SERPL-CCNC: 12 U/L (ref 10–40)
BASOPHILS # BLD AUTO: 0.04 K/UL (ref 0–0.2)
BASOPHILS NFR BLD: 0.5 % (ref 0–1.9)
BILIRUB SERPL-MCNC: 0.5 MG/DL (ref 0.1–1)
BUN SERPL-MCNC: 7 MG/DL (ref 8–23)
CALCIUM SERPL-MCNC: 9.1 MG/DL (ref 8.7–10.5)
CHLORIDE SERPL-SCNC: 102 MMOL/L (ref 95–110)
CHOLEST SERPL-MCNC: 165 MG/DL (ref 120–199)
CHOLEST/HDLC SERPL: 3.9 {RATIO} (ref 2–5)
CO2 SERPL-SCNC: 26 MMOL/L (ref 23–29)
CREAT SERPL-MCNC: 0.7 MG/DL (ref 0.5–1.4)
CREAT SERPL-MCNC: 0.8 MG/DL (ref 0.5–1.4)
DIFFERENTIAL METHOD: ABNORMAL
EOSINOPHIL # BLD AUTO: 0.2 K/UL (ref 0–0.5)
EOSINOPHIL NFR BLD: 2.8 % (ref 0–8)
ERYTHROCYTE [DISTWIDTH] IN BLOOD BY AUTOMATED COUNT: 11.7 % (ref 11.5–14.5)
EST. GFR  (AFRICAN AMERICAN): >60 ML/MIN/1.73 M^2
EST. GFR  (NON AFRICAN AMERICAN): >60 ML/MIN/1.73 M^2
GLUCOSE SERPL-MCNC: 74 MG/DL (ref 70–110)
HCT VFR BLD AUTO: 35.6 % (ref 37–48.5)
HDLC SERPL-MCNC: 42 MG/DL (ref 40–75)
HDLC SERPL: 25.5 % (ref 20–50)
HGB BLD-MCNC: 12.5 G/DL (ref 12–16)
IMM GRANULOCYTES # BLD AUTO: 0.01 K/UL (ref 0–0.04)
IMM GRANULOCYTES NFR BLD AUTO: 0.1 % (ref 0–0.5)
INR PPP: 1.1 (ref 0.8–1.2)
LDLC SERPL CALC-MCNC: 111.6 MG/DL (ref 63–159)
LYMPHOCYTES # BLD AUTO: 3.8 K/UL (ref 1–4.8)
LYMPHOCYTES NFR BLD: 45.7 % (ref 18–48)
MCH RBC QN AUTO: 32.2 PG (ref 27–31)
MCHC RBC AUTO-ENTMCNC: 35.1 G/DL (ref 32–36)
MCV RBC AUTO: 92 FL (ref 82–98)
MONOCYTES # BLD AUTO: 0.7 K/UL (ref 0.3–1)
MONOCYTES NFR BLD: 8.6 % (ref 4–15)
NEUTROPHILS # BLD AUTO: 3.5 K/UL (ref 1.8–7.7)
NEUTROPHILS NFR BLD: 42.3 % (ref 38–73)
NONHDLC SERPL-MCNC: 123 MG/DL
NRBC BLD-RTO: 0 /100 WBC
PLATELET # BLD AUTO: 218 K/UL (ref 150–350)
PMV BLD AUTO: 9.5 FL (ref 9.2–12.9)
POC PTINR: 1 (ref 0.9–1.2)
POC PTWBT: 12 SEC (ref 9.7–14.3)
POCT GLUCOSE: 117 MG/DL (ref 70–110)
POTASSIUM SERPL-SCNC: 3.2 MMOL/L (ref 3.5–5.1)
PROT SERPL-MCNC: 6.8 G/DL (ref 6–8.4)
PROTHROMBIN TIME: 11.4 SEC (ref 9–12.5)
RBC # BLD AUTO: 3.88 M/UL (ref 4–5.4)
SAMPLE: NORMAL
SAMPLE: NORMAL
SODIUM SERPL-SCNC: 138 MMOL/L (ref 136–145)
TRIGL SERPL-MCNC: 57 MG/DL (ref 30–150)
TSH SERPL DL<=0.005 MIU/L-ACNC: 0.75 UIU/ML (ref 0.4–4)
WBC # BLD AUTO: 8.33 K/UL (ref 3.9–12.7)

## 2019-09-08 PROCEDURE — 93005 ELECTROCARDIOGRAM TRACING: CPT

## 2019-09-08 PROCEDURE — 99223 PR INITIAL HOSPITAL CARE,LEVL III: ICD-10-PCS | Mod: GC,,, | Performed by: PSYCHIATRY & NEUROLOGY

## 2019-09-08 PROCEDURE — 99223 1ST HOSP IP/OBS HIGH 75: CPT | Mod: GC,,, | Performed by: PSYCHIATRY & NEUROLOGY

## 2019-09-08 PROCEDURE — 99291 CRITICAL CARE FIRST HOUR: CPT | Mod: ,,, | Performed by: EMERGENCY MEDICINE

## 2019-09-08 PROCEDURE — 80053 COMPREHEN METABOLIC PANEL: CPT

## 2019-09-08 PROCEDURE — 63600175 PHARM REV CODE 636 W HCPCS: Performed by: STUDENT IN AN ORGANIZED HEALTH CARE EDUCATION/TRAINING PROGRAM

## 2019-09-08 PROCEDURE — 85025 COMPLETE CBC W/AUTO DIFF WBC: CPT

## 2019-09-08 PROCEDURE — 84443 ASSAY THYROID STIM HORMONE: CPT

## 2019-09-08 PROCEDURE — 20600001 HC STEP DOWN PRIVATE ROOM

## 2019-09-08 PROCEDURE — 93010 EKG 12-LEAD: ICD-10-PCS | Mod: ,,, | Performed by: INTERNAL MEDICINE

## 2019-09-08 PROCEDURE — 82565 ASSAY OF CREATININE: CPT

## 2019-09-08 PROCEDURE — 99291 CRITICAL CARE FIRST HOUR: CPT | Mod: 25

## 2019-09-08 PROCEDURE — 99291 PR CRITICAL CARE, E/M 30-74 MINUTES: ICD-10-PCS | Mod: ,,, | Performed by: EMERGENCY MEDICINE

## 2019-09-08 PROCEDURE — 80061 LIPID PANEL: CPT

## 2019-09-08 PROCEDURE — 25000003 PHARM REV CODE 250: Performed by: NURSE PRACTITIONER

## 2019-09-08 PROCEDURE — 93010 ELECTROCARDIOGRAM REPORT: CPT | Mod: ,,, | Performed by: INTERNAL MEDICINE

## 2019-09-08 PROCEDURE — 85610 PROTHROMBIN TIME: CPT

## 2019-09-08 PROCEDURE — 25500020 PHARM REV CODE 255: Performed by: EMERGENCY MEDICINE

## 2019-09-08 PROCEDURE — 25000003 PHARM REV CODE 250: Performed by: STUDENT IN AN ORGANIZED HEALTH CARE EDUCATION/TRAINING PROGRAM

## 2019-09-08 RX ORDER — SODIUM CHLORIDE 0.9 % (FLUSH) 0.9 %
10 SYRINGE (ML) INJECTION
Status: DISCONTINUED | OUTPATIENT
Start: 2019-09-08 | End: 2019-09-09 | Stop reason: HOSPADM

## 2019-09-08 RX ORDER — GLUCAGON 1 MG
1 KIT INJECTION
Status: DISCONTINUED | OUTPATIENT
Start: 2019-09-08 | End: 2019-09-09

## 2019-09-08 RX ORDER — ASPIRIN 81 MG/1
81 TABLET ORAL DAILY
Status: DISCONTINUED | OUTPATIENT
Start: 2019-09-09 | End: 2019-09-09 | Stop reason: HOSPADM

## 2019-09-08 RX ORDER — LABETALOL HCL 20 MG/4 ML
10 SYRINGE (ML) INTRAVENOUS EVERY 6 HOURS PRN
Status: DISCONTINUED | OUTPATIENT
Start: 2019-09-08 | End: 2019-09-09 | Stop reason: HOSPADM

## 2019-09-08 RX ORDER — POTASSIUM CHLORIDE 20 MEQ/1
20 TABLET, EXTENDED RELEASE ORAL ONCE
Status: COMPLETED | OUTPATIENT
Start: 2019-09-08 | End: 2019-09-08

## 2019-09-08 RX ORDER — POTASSIUM CHLORIDE 7.45 MG/ML
10 INJECTION INTRAVENOUS
Status: DISPENSED | OUTPATIENT
Start: 2019-09-08 | End: 2019-09-08

## 2019-09-08 RX ORDER — ONDANSETRON 8 MG/1
8 TABLET, ORALLY DISINTEGRATING ORAL EVERY 8 HOURS PRN
Status: DISCONTINUED | OUTPATIENT
Start: 2019-09-08 | End: 2019-09-09 | Stop reason: HOSPADM

## 2019-09-08 RX ORDER — INSULIN ASPART 100 [IU]/ML
0-5 INJECTION, SOLUTION INTRAVENOUS; SUBCUTANEOUS
Status: DISCONTINUED | OUTPATIENT
Start: 2019-09-08 | End: 2019-09-09

## 2019-09-08 RX ORDER — AMOXICILLIN 250 MG
1 CAPSULE ORAL 2 TIMES DAILY
Status: DISCONTINUED | OUTPATIENT
Start: 2019-09-08 | End: 2019-09-09 | Stop reason: HOSPADM

## 2019-09-08 RX ORDER — RAMELTEON 8 MG/1
8 TABLET ORAL NIGHTLY PRN
Status: DISCONTINUED | OUTPATIENT
Start: 2019-09-08 | End: 2019-09-09 | Stop reason: HOSPADM

## 2019-09-08 RX ORDER — IBUPROFEN 200 MG
24 TABLET ORAL
Status: DISCONTINUED | OUTPATIENT
Start: 2019-09-08 | End: 2019-09-09

## 2019-09-08 RX ORDER — IBUPROFEN 200 MG
16 TABLET ORAL
Status: DISCONTINUED | OUTPATIENT
Start: 2019-09-08 | End: 2019-09-09

## 2019-09-08 RX ORDER — ONDANSETRON 2 MG/ML
4 INJECTION INTRAMUSCULAR; INTRAVENOUS EVERY 12 HOURS PRN
Status: DISCONTINUED | OUTPATIENT
Start: 2019-09-08 | End: 2019-09-09 | Stop reason: HOSPADM

## 2019-09-08 RX ORDER — POLYETHYLENE GLYCOL 3350 17 G/17G
17 POWDER, FOR SOLUTION ORAL DAILY
Status: DISCONTINUED | OUTPATIENT
Start: 2019-09-09 | End: 2019-09-09 | Stop reason: HOSPADM

## 2019-09-08 RX ORDER — ATORVASTATIN CALCIUM 20 MG/1
80 TABLET, FILM COATED ORAL DAILY
Status: DISCONTINUED | OUTPATIENT
Start: 2019-09-09 | End: 2019-09-09 | Stop reason: HOSPADM

## 2019-09-08 RX ORDER — ACETAMINOPHEN 325 MG/1
650 TABLET ORAL EVERY 6 HOURS PRN
Status: DISCONTINUED | OUTPATIENT
Start: 2019-09-08 | End: 2019-09-09 | Stop reason: HOSPADM

## 2019-09-08 RX ORDER — CLOPIDOGREL BISULFATE 75 MG/1
75 TABLET ORAL DAILY
Status: DISCONTINUED | OUTPATIENT
Start: 2019-09-09 | End: 2019-09-09 | Stop reason: HOSPADM

## 2019-09-08 RX ORDER — HEPARIN SODIUM 5000 [USP'U]/ML
5000 INJECTION, SOLUTION INTRAVENOUS; SUBCUTANEOUS EVERY 8 HOURS
Status: DISCONTINUED | OUTPATIENT
Start: 2019-09-08 | End: 2019-09-09 | Stop reason: HOSPADM

## 2019-09-08 RX ADMIN — IOHEXOL 100 ML: 350 INJECTION, SOLUTION INTRAVENOUS at 01:09

## 2019-09-08 RX ADMIN — HEPARIN SODIUM 5000 UNITS: 5000 INJECTION, SOLUTION INTRAVENOUS; SUBCUTANEOUS at 08:09

## 2019-09-08 RX ADMIN — POTASSIUM CHLORIDE 20 MEQ: 20 TABLET, EXTENDED RELEASE ORAL at 10:09

## 2019-09-08 RX ADMIN — RAMELTEON 8 MG: 8 TABLET ORAL at 09:09

## 2019-09-08 NOTE — ASSESSMENT & PLAN NOTE
66 y/o female with a medical history of prior CVAs (most recent stroke was in 12/2018) in the past, HTN and HLD was brought in by the EMS for slurred speech. Last known normal was 9 AM. Patient went to [facility] and came back home. When speaking to her daughter over the phone, she noticed that she was slurring her words. This prompted her to call EMS. Upon arrival CTA was done which did not show any acute major vascular distribution infarction, significant mass effect, or intraparenchymal hemorrhage. No LVO noted. There was chronic microvascular ischemic changes.  Diminutive distal left vertebral artery and diminutive P1 PCA segments bilaterally (which was also seen in her previous MRA in [facility]). MRI w/o contrast showed acute infarction of the left corona radiata. Etiology most likely small vessel disease.    Antithrombotics for secondary stroke prevention: Antiplatelets: Aspirin: 81 mg daily  Clopidogrel: 75 mg daily    Statins for secondary stroke prevention and hyperlipidemia, if present:   Statins: Atorvastatin- 80 mg daily    Aggressive risk factor modification: HTN, DM, HLD     Rehab efforts: The patient has been evaluated by a stroke team provider and the therapy needs have been fully considered based off the presenting complaints and exam findings. The following therapy evaluations are needed: PT evaluate and treat, OT evaluate and treat, SLP evaluate and treat, PM&R evaluate for appropriate placement    Diagnostics ordered/pending: TTE to assess cardiac function/status     VTE prophylaxis: Heparin 5000 units SQ every 8 hours    BP parameters: Infarct: No intervention, SBP <220

## 2019-09-08 NOTE — SUBJECTIVE & OBJECTIVE
Past Medical History:   Diagnosis Date    Arthritis     Back pain with sciatica     Diabetes mellitus     Hypertension     Stroke      No past surgical history on file.  Family History   Problem Relation Age of Onset    Cancer Brother      Social History     Tobacco Use    Smoking status: Current Some Day Smoker     Packs/day: 0.50     Years: 40.00     Pack years: 20.00     Types: Cigarettes    Smokeless tobacco: Never Used   Substance Use Topics    Alcohol use: No     Comment: Recently stopped    Drug use: No     Review of patient's allergies indicates:  No Known Allergies    Medications: I have reviewed the current medication administration record.      (Not in a hospital admission)    Review of Systems   Constitutional: Negative for activity change, appetite change, chills, diaphoresis and fatigue.   HENT: Negative for sneezing, sore throat and tinnitus.    Gastrointestinal: Negative for abdominal pain, anal bleeding, blood in stool, constipation and diarrhea.   Neurological: Positive for speech difficulty and light-headedness. Negative for dizziness, tremors, seizures, syncope, facial asymmetry, weakness, numbness and headaches.     Objective:     Vital Signs (Most Recent):  Temp: 98.4 °F (36.9 °C) (09/08/19 1306)  Pulse: (!) 50 (09/08/19 1618)  Resp: 19 (09/08/19 1616)  BP: (!) 147/90 (09/08/19 1619)  SpO2: 97 % (09/08/19 1619)    Vital Signs Range (Last 24H):  Temp:  [98.4 °F (36.9 °C)]   Pulse:  [48-58]   Resp:  [14-20]   BP: (142-165)/(67-90)   SpO2:  [97 %-100 %]     Physical Exam   Constitutional: She is oriented to person, place, and time.   Cardiovascular: Normal rate, regular rhythm and normal heart sounds. Exam reveals no gallop and no friction rub.   No murmur heard.  Pulmonary/Chest: Effort normal and breath sounds normal. No stridor. No respiratory distress. She has no wheezes. She has no rales. She exhibits no tenderness.   Abdominal: Soft. Bowel sounds are normal. She exhibits no  distension and no mass. There is no tenderness. There is no guarding.   Neurological: She is alert and oriented to person, place, and time. No cranial nerve deficit.       Neurological Exam:   LOC: alert  Attention Span: Good   Language: No aphasia, Other: Mild aphasia which is resolving   Articulation: Dysarthria  Orientation: Person, Place, Time   Visual Fields: Full  EOM (CN III, IV, VI): Full/intact  Pupils (CN II, III): PERRL  Facial Sensation (CN V): Normal  Facial Movement (CN VII): Symmetric facial expression    Reflexes: 2+ throughout  Motor: Arm left  Normal 5/5  Leg left  Normal 5/5  Arm right  Normal 5/5  Leg right Normal 5/5  Cebellar: No evidence of appendicular or axial ataxia  Sensation: Intact to light touch, temperature and vibration  Tone: Normal tone throughout      Laboratory:  CMP:   Recent Labs   Lab 09/08/19  1330   CALCIUM 9.1   ALBUMIN 3.5   PROT 6.8      K 3.2*   CO2 26      BUN 7*   CREATININE 0.8   ALKPHOS 55   ALT 11   AST 12   BILITOT 0.5     CBC:   Recent Labs   Lab 09/08/19  1330   WBC 8.33   RBC 3.88*   HGB 12.5   HCT 35.6*      MCV 92   MCH 32.2*   MCHC 35.1     Lipid Panel:   Recent Labs   Lab 09/08/19  1330   CHOL 165   LDLCALC 111.6   HDL 42   TRIG 57     Coagulation:   Recent Labs   Lab 09/08/19  1330   INR 1.1     Hgb A1C: No results for input(s): HGBA1C in the last 168 hours.  TSH:   Recent Labs   Lab 09/08/19  1330   TSH 0.747       Diagnostic Results:      Brain imaging:  MRI 9/8/19       Small focus of diffusion restriction in the left corona radiata, consistent with acute infarction.    Extensive lacunar type infarctions in the basal ganglia, thalami, and in the blayne.    Unchanged appearance of scattered foci of old microhemorrhages in the supratentorial brain and blayne.    Vessel Imaging:      No acute major vascular distribution infarction, significant mass effect, or intraparenchymal hemorrhage.    Moderate amount of scattered white matter disease,  most consistent with chronic microvascular ischemic change.    No arterial occlusion identified.  Diminutive distal left vertebral artery and diminutive P1 PCA segments bilaterally.  Prominent posterior communicating arteries noted, normal variant.    50% stenosis of the left ICA origin.    Cardiac Evaluation:   TTE: pending

## 2019-09-08 NOTE — H&P
[facility]  Vascular Neurology  Comprehensive Stroke Center  History & Physical    Consults  Assessment/Plan:     Patient is a 63 y.o. year old female with:    * Aphasia  Although patient has acute infarction in the left corona radiata, vascular etiology does not explain the symptoms. This could have been a combination of drug induced, and hypokalemia with an acute infarction.     Cerebral infarction due to thrombosis of left middle cerebral artery  66 y/o female with a medical history of prior CVAs (most recent stroke was in 12/2018) in the past, HTN and HLD was brought in by the EMS for slurred speech. Last known normal was 9 AM. Patient went to [facility] and came back home. When speaking to her daughter over the phone, she noticed that she was slurring her words. This prompted her to call EMS. Upon arrival CTA was done which did not show any acute major vascular distribution infarction, significant mass effect, or intraparenchymal hemorrhage. No LVO noted. There was chronic microvascular ischemic changes.  Diminutive distal left vertebral artery and diminutive P1 PCA segments bilaterally (which was also seen in her previous MRA in [facility]). MRI w/o contrast showed acute infarction of the left corona radiata. Etiology most likely small vessel disease.    Antithrombotics for secondary stroke prevention: Antiplatelets: Aspirin: 81 mg daily  Clopidogrel: 75 mg daily    Statins for secondary stroke prevention and hyperlipidemia, if present:   Statins: Atorvastatin- 80 mg daily    Aggressive risk factor modification: HTN, DM, HLD     Rehab efforts: The patient has been evaluated by a stroke team provider and the therapy needs have been fully considered based off the presenting complaints and exam findings. The following therapy evaluations are needed: PT evaluate and treat, OT evaluate and treat, SLP evaluate and treat, PM&R evaluate for appropriate placement    Diagnostics ordered/pending: TTE to  assess cardiac function/status     VTE prophylaxis: Heparin 5000 units SQ every 8 hours    BP parameters: Infarct: No intervention, SBP <220        Hypokalemia  Potassium 3.2- aphasia might be a recrudescence of old stroke symptoms due to electrolyte abnormalities.   Plan  1) replace potassium         STROKE DOCUMENTATION     Acute Stroke Times   Last Known Normal Date: 09/08/19  Last Known Normal Time: 0900  Symptom Onset Date: 09/08/19  Symptom Onset Time: 0900  Stroke Team Called Date: 09/08/19  Stroke Team Called Time: 1301  Stroke Team Arrival Date: 09/08/19  Stroke Team Arrival Time: 1306    NIH Scale:  1a. Level of Consciousness: 0-->Alert, keenly responsive  1b. LOC Questions: 0-->Answers both questions correctly  1c. LOC Commands: 0-->Performs both tasks correctly  2. Best Gaze: 0-->Normal  3. Visual: 0-->No visual loss  4. Facial Palsy: 0-->Normal symmetrical movements  5a. Motor Arm, Left: 0-->No drift, limb holds 90 (or 45) degrees for full 10 secs  5b. Motor Arm, Right: 0-->No drift, limb holds 90 (or 45) degrees for full 10 secs  6a. Motor Leg, Left: 0-->No drift, leg holds 30 degree position for full 5 secs  6b. Motor Leg, Right: 0-->No drift, leg holds 30 degree position for full 5 secs  7. Limb Ataxia: 0-->Absent  8. Sensory: 0-->Normal, no sensory loss  9. Best Language: 1-->Mild-to-moderate aphasia, some obvious loss of fluency or facility of comprehension, without significant limitation on ideas expressed or form of expression. Reduction of speech and/or comprehension, however, makes conversation. . . (see row details)  10. Dysarthria: 1-->Mild-to-moderate dysarthria, patient slurs at least some words and, at worst, can be understood with some difficulty  11. Extinction and Inattention (formerly Neglect): 0-->No abnormality  Total (NIH Stroke Scale): 2     Modified Dmitry    [place] Coma Scale:15   ABCD2 Score:    [id number] Score:   HAS -BLED Score:   ICH Score:   Hunt & Grayson Classification:   "    Hemorrhagic change of an Ischemic Stroke: Does this patient have an ischemic stroke with hemorrhagic changes? No         Subjective:     History of Present Illness:  62 y/o with a medical history of prior CVAs in the past (most recent in 2018 showing lacunar infarcts in the right corona radiata, b/l BG and right thalamus), type 2 diabetes and HTN brought into the ED today by EMS with complaints of aphasia. Last known normal was around 9 AM today when she went to [facility]. As per patient, her daughter forced her to call 911 as she was slurring her words. Upon arrival to the ED, NIHSS 2 (aphasia and dysarthia). No drift in the arms or legs, 5/5 strength upper and lower extremities. CTA multiphase done in the ED which so far does not show any acute abnormalities. Awaiting MRI. Patient potassium is 3.2.     Patient previously admitted to [facility] for further stroke workup after being admitted for unsteady gait, dizziness and vomiting. On admit to [facility] patient had remote lacunar infarcts in the right corona radiata, bilateral basal ganglia and right thalamus (that appear to be chronic appearing). MRI brain showed acute infarct left parietal lobe with tiny focus of "susceptibility changes" right caudate nucleus and lentiform nucleus and left thalamus.  MRA Head/Neck showed irregularity along the the P2 and P3 segments of the posterior cerebral arteries bilaterally, likely from stenotic disease. Transthoracic echo with EF 65% and mild left ventricular diastolic dysfunction. Patient was discharged to  rehab with follow up with neurology (patient was suppose to see Dr Menon).     Home medications include liptor 80 mg, coreg 25 mg BID, lisinopril 40 mg daily, claritine 10 mg, tramadol 50 mg daily, trazadone 50 mg daily, and metformin 500 mg daily. Patient mentioned that she took 10 mg oxycodone for her back pain. Unclear when she took it but mentions "might have been last night". She also takes other pain " medications for her back pain as well.         Past Medical History:   Diagnosis Date    Arthritis     Back pain with sciatica     Diabetes mellitus     Hypertension     Stroke      No past surgical history on file.  Family History   Problem Relation Age of Onset    Cancer Brother      Social History     Tobacco Use    Smoking status: Current Some Day Smoker     Packs/day: 0.50     Years: 40.00     Pack years: 20.00     Types: Cigarettes    Smokeless tobacco: Never Used   Substance Use Topics    Alcohol use: No     Comment: Recently stopped    Drug use: No     Review of patient's allergies indicates:  No Known Allergies    Medications: I have reviewed the current medication administration record.      (Not in a hospital admission)    Review of Systems   Constitutional: Negative for activity change, appetite change, chills, diaphoresis and fatigue.   HENT: Negative for sneezing, sore throat and tinnitus.    Gastrointestinal: Negative for abdominal pain, anal bleeding, blood in stool, constipation and diarrhea.   Neurological: Positive for speech difficulty and light-headedness. Negative for dizziness, tremors, seizures, syncope, facial asymmetry, weakness, numbness and headaches.     Objective:     Vital Signs (Most Recent):  Temp: 98.4 °F (36.9 °C) (09/08/19 1306)  Pulse: (!) 50 (09/08/19 1618)  Resp: 19 (09/08/19 1616)  BP: (!) 147/90 (09/08/19 1619)  SpO2: 97 % (09/08/19 1619)    Vital Signs Range (Last 24H):  Temp:  [98.4 °F (36.9 °C)]   Pulse:  [48-58]   Resp:  [14-20]   BP: (142-165)/(67-90)   SpO2:  [97 %-100 %]     Physical Exam   Constitutional: She is oriented to person, place, and time.   Cardiovascular: Normal rate, regular rhythm and normal heart sounds. Exam reveals no gallop and no friction rub.   No murmur heard.  Pulmonary/Chest: Effort normal and breath sounds normal. No stridor. No respiratory distress. She has no wheezes. She has no rales. She exhibits no tenderness.   Abdominal: Soft.  Bowel sounds are normal. She exhibits no distension and no mass. There is no tenderness. There is no guarding.   Neurological: She is alert and oriented to person, place, and time. No cranial nerve deficit.       Neurological Exam:   LOC: alert  Attention Span: Good   Language: No aphasia, Other: Mild aphasia which is resolving   Articulation: Dysarthria  Orientation: Person, Place, Time   Visual Fields: Full  EOM (CN III, IV, VI): Full/intact  Pupils (CN II, III): PERRL  Facial Sensation (CN V): Normal  Facial Movement (CN VII): Symmetric facial expression    Reflexes: 2+ throughout  Motor: Arm left  Normal 5/5  Leg left  Normal 5/5  Arm right  Normal 5/5  Leg right Normal 5/5  Cebellar: No evidence of appendicular or axial ataxia  Sensation: Intact to light touch, temperature and vibration  Tone: Normal tone throughout      Laboratory:  CMP:   Recent Labs   Lab 09/08/19  1330   CALCIUM 9.1   ALBUMIN 3.5   PROT 6.8      K 3.2*   CO2 26      BUN 7*   CREATININE 0.8   ALKPHOS 55   ALT 11   AST 12   BILITOT 0.5     CBC:   Recent Labs   Lab 09/08/19  1330   WBC 8.33   RBC 3.88*   HGB 12.5   HCT 35.6*      MCV 92   MCH 32.2*   MCHC 35.1     Lipid Panel:   Recent Labs   Lab 09/08/19  1330   CHOL 165   LDLCALC 111.6   HDL 42   TRIG 57     Coagulation:   Recent Labs   Lab 09/08/19  1330   INR 1.1     Hgb A1C: No results for input(s): HGBA1C in the last 168 hours.  TSH:   Recent Labs   Lab 09/08/19  1330   TSH 0.747       Diagnostic Results:      Brain imaging:  MRI 9/8/19       Small focus of diffusion restriction in the left corona radiata, consistent with acute infarction.    Extensive lacunar type infarctions in the basal ganglia, thalami, and in the blayne.    Unchanged appearance of scattered foci of old microhemorrhages in the supratentorial brain and blayne.    Vessel Imaging:      No acute major vascular distribution infarction, significant mass effect, or intraparenchymal hemorrhage.    Moderate  amount of scattered white matter disease, most consistent with chronic microvascular ischemic change.    No arterial occlusion identified.  Diminutive distal left vertebral artery and diminutive P1 PCA segments bilaterally.  Prominent posterior communicating arteries noted, normal variant.    50% stenosis of the left ICA origin.    Cardiac Evaluation:   TTE: pending         Inga Moser MD  Comprehensive Stroke Center  Department of Vascular Neurology   [facility]

## 2019-09-08 NOTE — HPI
"64 y/o with a medical history of prior CVAs in the past (most recent in 2018 showing lacunar infarcts in the right corona radiata, b/l BG and right thalamus), type 2 diabetes and HTN brought into the ED today by EMS with complaints of aphasia. Last known normal was around 9 AM today when she went to [facility]. As per patient, her daughter forced her to call 911 as she was slurring her words. Upon arrival to the ED, NIHSS 2 (aphasia and dysarthia). No drift in the arms or legs, 5/5 strength upper and lower extremities. CTA multiphase done in the ED which so far does not show any acute abnormalities. Awaiting MRI. Patient potassium is 3.2.     Patient previously admitted to [facility] for further stroke workup after being admitted for unsteady gait, dizziness and vomiting. On admit to [facility] patient had remote lacunar infarcts in the right corona radiata, bilateral basal ganglia and right thalamus (that appear to be chronic appearing). MRI brain showed acute infarct left parietal lobe with tiny focus of "susceptibility changes" right caudate nucleus and lentiform nucleus and left thalamus.  MRA Head/Neck showed irregularity along the the P2 and P3 segments of the posterior cerebral arteries bilaterally, likely from stenotic disease. Transthoracic echo with EF 65% and mild left ventricular diastolic dysfunction. Patient was discharged to IP rehab with follow up with neurology (patient was suppose to see Dr Menon).     Home medications include liptor 80 mg, coreg 25 mg BID, lisinopril 40 mg daily, claritine 10 mg, tramadol 50 mg daily, trazadone 50 mg daily, and metformin 500 mg daily. Patient mentioned that she took 10 mg oxycodone for her back pain. Unclear when she took it but mentions "might have been last night". She also takes other pain medications for her back pain as well.   "

## 2019-09-08 NOTE — CONSULTS
[facility]  Vascular Neurology  Comprehensive Stroke Center  Consult Note    Inpatient consult to Vascular (Stroke) Neurology  Consult performed by: Inga Moser MD  Consult ordered by: Darryl Sandoval MD        Assessment/Plan:     Patient is a 63 y.o. year old female with:    * Aphasia  66 y/o female with a medical history of prior CVAs (most recent stroke was in 12/2018) in the past, HTN and HLD was brought in by the EMS for slurred speech. Last known normal was 9 AM. Patient went to [employer] and came back home. When speaking to her daughter over the phone, she noticed that she was slurring her words. This prompted her to call EMS. Upon arrival CTA was done which did not show any acute major vascular distribution infarction, significant mass effect, or intraparenchymal hemorrhage. No LVO noted. There was chronic microvascular ischemic changes.  Diminutive distal left vertebral artery and diminutive P1 PCA segments bilaterally (which was also seen in her previous MRA in [facility], [place]). Prominent posterior communicating arteries noted, normal variant. 50% stenosis of the left ICA origin. Patient stated that she took 10 mg of oxycodone for her back pain before symptoms started. She is also on tramadol and trazadone for pain as well.     Differential Diagnosis  Based on her symptoms, most likely the slurred speech could be medication induced aphasia (took oxycodone and is on tramadol and trazadone). Other reasons for her slurred speech can be a recrudescence of her old stroke due to another etiology, such as dehydration and hypokalemia (as seen in this patient). As patient only has apahsia, unclear in which territory the vascular infarct may be. Could possibly be in the MCA region however, we would also see other deficits as well which this patient does not have. Must obtain an MRI to rule out a vascular etiology.    Plan  1) Obtain MRI brain w/o contrast. If it does not show acute infarct will not  admit to stroke service.   2) Vascular surgery consultation as an outpatient for symptomatic carotid stenosis.      Hypokalemia  Potassium 3.2- aphasia might be a recrudescence of old stroke symptoms due to electrolyte abnormalities.         STROKE DOCUMENTATION     Acute Stroke Times   Last Known Normal Date: 09/08/19  Last Known Normal Time: 0900  Symptom Onset Date: 09/08/19  Symptom Onset Time: 0900  Stroke Team Called Date: 09/08/19  Stroke Team Called Time: 1301  Stroke Team Arrival Date: 09/08/19  Stroke Team Arrival Time: 1306    NIH Scale:  1a. Level of Consciousness: 0-->Alert, keenly responsive  1b. LOC Questions: 0-->Answers both questions correctly  1c. LOC Commands: 0-->Performs both tasks correctly  2. Best Gaze: 0-->Normal  3. Visual: 0-->No visual loss  4. Facial Palsy: 0-->Normal symmetrical movements  5a. Motor Arm, Left: 0-->No drift, limb holds 90 (or 45) degrees for full 10 secs  5b. Motor Arm, Right: 0-->No drift, limb holds 90 (or 45) degrees for full 10 secs  6a. Motor Leg, Left: 0-->No drift, leg holds 30 degree position for full 5 secs  6b. Motor Leg, Right: 0-->No drift, leg holds 30 degree position for full 5 secs  7. Limb Ataxia: 0-->Absent  8. Sensory: 0-->Normal, no sensory loss  9. Best Language: 1-->Mild-to-moderate aphasia, some obvious loss of fluency or facility of comprehension, without significant limitation on ideas expressed or form of expression. Reduction of speech and/or comprehension, however, makes conversation. . . (see row details)  10. Dysarthria: 1-->Mild-to-moderate dysarthria, patient slurs at least some words and, at worst, can be understood with some difficulty  11. Extinction and Inattention (formerly Neglect): 0-->No abnormality  Total (NIH Stroke Scale): 2    Modified [place]    [place] Coma Scale:15   ABCD2 Score:    [id number] Score:   HAS -BLED Score:   ICH Score:   Hunt & Grayson Classification:         Subjective:     History of Present Illness:  64 y/o  "with a medical history of prior CVAs in the past (most recent in 2018 showing lacunar infarcts in the right corona radiata, b/l BG and right thalamus), type 2 diabetes and HTN brought into the ED today by EMS with complaints of aphasia. Last known normal was around 9 AM today when she went to [employer]. As per patient, her daughter forced her to call 911 as she was slurring her words. Upon arrival to the ED, NIHSS 2 (aphasia and dysarthia). No drift in the arms or legs, 5/5 strength upper and lower extremities. CTA multiphase done in the ED which so far does not show any acute abnormalities. Awaiting MRI. Patient potassium is 3.2.     Patient previously admitted to [facility] for further stroke workup after being admitted for unsteady gait, dizziness and vomiting. On admit to [facility] patient had remote lacunar infarcts in the right corona radiata, bilateral basal ganglia and right thalamus (that appear to be chronic appearing). MRI brain showed acute infarct left parietal lobe with tiny focus of "susceptibility changes" right caudate nucleus and lentiform nucleus and left thalamus.  MRA Head/Neck showed irregularity along the the P2 and P3 segments of the posterior cerebral arteries bilaterally, likely from stenotic disease. Transthoracic echo with EF 65% and mild left ventricular diastolic dysfunction. Patient was discharged to IP rehab with follow up with neurology (patient was suppose to see Dr Menon).     Home medications include liptor 80 mg, coreg 25 mg BID, lisinopril 40 mg daily, claritine 10 mg, tramadol 50 mg daily, trazadone 50 mg daily, and metformin 500 mg daily. Patient mentioned that she took 10 mg oxycodone for her back pain. Unclear when she took it but mentions "might have been last night". She also takes other pain medications for her back pain as well.         Past Medical History:   Diagnosis Date    Arthritis     Back pain with sciatica     Diabetes mellitus     Hypertension     Stroke  "     No past surgical history on file.  Family History   Problem Relation Age of Onset    Cancer Brother      Social History     Tobacco Use    Smoking status: Current Some Day Smoker     Packs/day: 0.50     Years: 40.00     Pack years: 20.00     Types: Cigarettes    Smokeless tobacco: Never Used   Substance Use Topics    Alcohol use: No     Comment: Recently stopped    Drug use: No     Review of patient's allergies indicates:  No Known Allergies    Medications: I have reviewed the current medication administration record.      (Not in a hospital admission)    Review of Systems   Constitutional: Negative for activity change, appetite change, chills, diaphoresis and fatigue.   HENT: Negative for sneezing, sore throat and tinnitus.    Gastrointestinal: Negative for abdominal pain, anal bleeding, blood in stool, constipation and diarrhea.   Neurological: Positive for speech difficulty and light-headedness. Negative for dizziness, tremors, seizures, syncope, facial asymmetry, weakness, numbness and headaches.     Objective:     Vital Signs (Most Recent):  Temp: 98.4 °F (36.9 °C) (09/08/19 1306)  Pulse: (!) 58 (09/08/19 1348)  Resp: 18 (09/08/19 1348)  BP: (!) 145/69 (09/08/19 1348)  SpO2: 97 % (09/08/19 1348)    Vital Signs Range (Last 24H):  Temp:  [98.4 °F (36.9 °C)]   Pulse:  [48-58]   Resp:  [18-20]   BP: (144-165)/(69-83)   SpO2:  [97 %-100 %]     Physical Exam   Constitutional: She is oriented to person, place, and time.   Cardiovascular: Normal rate, regular rhythm and normal heart sounds. Exam reveals no gallop and no friction rub.   No murmur heard.  Pulmonary/Chest: Effort normal and breath sounds normal. No stridor. No respiratory distress. She has no wheezes. She has no rales. She exhibits no tenderness.   Abdominal: Soft. Bowel sounds are normal. She exhibits no distension and no mass. There is no tenderness. There is no guarding.   Neurological: She is alert and oriented to person, place, and time. No  cranial nerve deficit.       Neurological Exam:   LOC: alert  Attention Span: Good   Language: No aphasia, Other: Mild aphasia which is resolving   Articulation: Dysarthria  Orientation: Person, Place, Time   Visual Fields: Full  EOM (CN III, IV, VI): Full/intact  Pupils (CN II, III): PERRL  Facial Sensation (CN V): Normal  Facial Movement (CN VII): Symmetric facial expression    Reflexes: 2+ throughout  Motor: Arm left  Normal 5/5  Leg left  Normal 5/5  Arm right  Normal 5/5  Leg right Normal 5/5  Cebellar: No evidence of appendicular or axial ataxia  Sensation: Intact to light touch, temperature and vibration  Tone: Normal tone throughout      Laboratory:  CMP:   Recent Labs   Lab 09/08/19  1330   CALCIUM 9.1   ALBUMIN 3.5   PROT 6.8      K 3.2*   CO2 26      BUN 7*   CREATININE 0.8   ALKPHOS 55   ALT 11   AST 12   BILITOT 0.5     CBC:   Recent Labs   Lab 09/08/19  1330   WBC 8.33   RBC 3.88*   HGB 12.5   HCT 35.6*      MCV 92   MCH 32.2*   MCHC 35.1     Lipid Panel:   Recent Labs   Lab 09/08/19  1330   CHOL 165   LDLCALC 111.6   HDL 42   TRIG 57     Coagulation:   Recent Labs   Lab 09/08/19  1330   INR 1.1     Hgb A1C: No results for input(s): HGBA1C in the last 168 hours.  TSH: No results for input(s): TSH in the last 168 hours.    Diagnostic Results:      Brain imaging:  MRI pending    Vessel Imaging:      No acute major vascular distribution infarction, significant mass effect, or intraparenchymal hemorrhage.    Moderate amount of scattered white matter disease, most consistent with chronic microvascular ischemic change.    No arterial occlusion identified.  Diminutive distal left vertebral artery and diminutive P1 PCA segments bilaterally.  Prominent posterior communicating arteries noted, normal variant.    50% stenosis of the left ICA origin.    Cardiac Evaluation:   TTE 12/2019   · Normal left ventricular systolic function. The estimated ejection fraction is 65%  · No wall motion  abnormalities.  · Grade I (mild) left ventricular diastolic dysfunction consistent with impaired relaxation.  · Normal right ventricular systolic function.      nIga Moser MD  Comprehensive Stroke Center  Department of Vascular Neurology   [facility]

## 2019-09-08 NOTE — HOSPITAL COURSE
Kaila Scott is a 63 y.o. female with PMHx of stroke(most recent stroke in 12/2018), HTN and HLD who was admitted for dysarthria and facial droop 2/2 L MCA stroke. MRI confirmed infarct in L corona radiata. CTA without LVO and ~50% L ICA stenosis at origin. VAS carotid US with 1-39% stenosis bilaterally. Etiology likely small vessel disease.    Patient evaluated by therapy who recommended outpatient SLP. Patient requesting home health, will follow up with case management about arranging home health. She was also given outpatient SLP orders if needed. For secondary stroke prevention, she will continue DAPT for 30 days then asa 81 mg monotherapy. She will also continue atorvastatin 80 mg. She was counseled on smoking cessation prior to discharge. Patient will resume all other home medications at discharge. Patient will be discharged home with outpatient SLP vs home health SLP. She will follow up with her PCP and stroke clinic.

## 2019-09-08 NOTE — ASSESSMENT & PLAN NOTE
Potassium 3.2- aphasia might be a recrudescence of old stroke symptoms due to electrolyte abnormalities.

## 2019-09-08 NOTE — ASSESSMENT & PLAN NOTE
Potassium 3.2- aphasia might be a recrudescence of old stroke symptoms due to electrolyte abnormalities.   Plan  1) replace potassium

## 2019-09-08 NOTE — ED PROVIDER NOTES
Encounter Date: 9/8/2019       History     Chief Complaint   Patient presents with    Aphasia     Patient arrives EMS from [facility] after sudden onset new slurred speech at 0900 - no improvement over 4 hours, so family called EMS - patient also has numbness to left side of face - no droop noted - no drift noted     HPI   62 Y/O F with Hx of CVA with no residual dysphagia or hemiplegia, DM, hypertension is BIBA C/O sudden onset, slurred speech at 9:00 a.m. leading to 911 activation.  No headache, visual changes, dizziness, numbness or weakness reported.  No alleviating or aggravated symptoms. No chest pain, back pain, abdominal pain. No nausea or vomiting. Otherwise no recent illness or any other complaints.  On further discussion patient reports intake oxycodone for chronic pain.    Review of patient's allergies indicates:  No Known Allergies  Past Medical History:   Diagnosis Date    Arthritis     Back pain with sciatica     Diabetes mellitus     Hypertension     Stroke      No past surgical history on file.  Family History   Problem Relation Age of Onset    Cancer Brother      Social History     Tobacco Use    Smoking status: Current Some Day Smoker     Packs/day: 0.50     Years: 40.00     Pack years: 20.00     Types: Cigarettes    Smokeless tobacco: Never Used   Substance Use Topics    Alcohol use: No     Comment: Recently stopped    Drug use: No     Review of Systems  CONST: No fever, chills, weight change, or fatigue.  HEENT: No headache, blurry vision/change in vision, sore throat, ear pain, eye pain, otorrhea, rhinorrhea, tooth pain, swelling, or voice changes.  NECK: No pain, masses, trauma, or redness.  HEART: No pain, palpitations, diaphoresis, nausea, or vomiting  LUNG: No SOB, cough, orthopnea, GREENE or other complaints.  ABDOMEN: No pain, nausea, vomiting, diarrhea, constipation, or flank pain  : No discharge, dysuria, lesions, rashes, masses, sores  EXTREMITIES: FROM with No swelling, redness,  "injuries/trauma, lesions, sores, weakness, numbness, or tingling  NEURO: +"difficulty with speech"; No dizziness, weakness, fatigue, tremors, headache, change in vision or disturbances of balance or coordination  SKIN: No lesions, rashes, trauma or other complaints    Physical Exam     Initial Vitals [09/08/19 1306]   BP Pulse Resp Temp SpO2   (!) 144/70 (!) 55 18 98.4 °F (36.9 °C) 97 %      MAP       --         Physical Exam  PHYSICAL EXAM:  GENERAL:  On Ambulance stretcher; anxious but Well-appearing and Non-Toxic; Well-Nourished; no airway instability.  HEENT: AT/NC;  EOMI, Acuity & Fields Grossly Intact; speaking sentences with + slurring of speech; No drooling/inability to tolerate oral secretions.  NECK: Supple, FROM with no meningismus, no accessory muscle use.  HEART:  Ramana rate with regular rhythm, no M/G/T.  LUNGS: No Tachypnea, No Retractions, and CTA B/L with no W/R/R.  ABDOMEN: +BS, Soft, ND, NTTP.   BACK: Atraumatic, No midline TTP to C/T/LS spine; No CVA tenderness B/L.   EXTREMITIES: FROM. Strength 5/5. Symmetrical Sensorium and with no deficits. Soft Comparments.  SKIN: Warm, Dry, No Skin Tears or Rashes.  VASCULAR: 2+ pulses Prox/Dist & Symmetrical with No delay.  NEUROLOGIC: AAOx3; No Receptive or Expressive Aphasia; Answering Questions Appropriately with audible slurring of speech; CN/PN Intact. NIHSS = 2  Current NIH Stroke Score Values      Most Recent Value   Interval  baseline   1a. Level of Consciousness  0-->Alert, keenly responsive   1b. LOC Questions  0-->Answers both questions correctly   1c. LOC Commands  0-->Performs both tasks correctly   2. Best Gaze  0-->Normal   3. Visual  0-->No visual loss   4. Facial Palsy  0-->Normal symmetrical movements   5a. Motor Arm, Left  0-->No drift, limb holds 90 (or 45) degrees for full 10 secs   5b. Motor Arm, Right  0-->No drift, limb holds 90 (or 45) degrees for full 10 secs   6a. Motor Leg, Left  0-->No drift, leg holds 30 degree position for " full 5 secs   6b. Motor Leg, Right  0-->No drift, leg holds 30 degree position for full 5 secs   7. Limb Ataxia  0-->Absent   8. Sensory  0-->Normal, no sensory loss   9. Best Language  1-->Mild-to-moderate aphasia, some obvious loss of fluency or facility of comprehension, without significant limitation on ideas expressed or form of expression. Reduction of speech and/or comprehension, however, makes conversation. . . (see row details)   10. Dysarthria  1-->Mild-to-moderate dysarthria, patient slurs at least some words and, at worst, can be understood with some difficulty   11. Extinction and Inattention (formerly Neglect)  0-->No abnormality   Total (NIH Stroke Scale)  2              ED Course   Procedures  Labs Reviewed   CBC W/ AUTO DIFFERENTIAL - Abnormal; Notable for the following components:       Result Value    RBC 3.88 (*)     Hematocrit 35.6 (*)     Mean Corpuscular Hemoglobin 32.2 (*)     All other components within normal limits   COMPREHENSIVE METABOLIC PANEL - Abnormal; Notable for the following components:    Potassium 3.2 (*)     BUN, Bld 7 (*)     All other components within normal limits   PROTIME-INR   TSH   LIPID PANEL   POCT GLUCOSE, HAND-HELD DEVICE   ISTAT PROCEDURE   ISTAT CREATININE     EKG Readings: (Independently Interpreted)   Sinus bradycardia at a rate of 47 beats per minute with left axis deviation; OH/QRS/QTC intervals within normal limits with delayed R-wave progression and no STEMI.  ____________________  Polo Sandoval MD, [facility]  Emergency Medicine Staff  1:55 PM 9/8/2019         Imaging Results          X-Ray Chest AP Portable (Final result)  Result time 09/08/19 15:06:27    Final result by Danny Day MD (09/08/19 15:06:27)                 Impression:      No acute findings.      Electronically signed by: Danny Day MD  Date:    09/08/2019  Time:    15:06             Narrative:    EXAMINATION:  XR CHEST AP PORTABLE    CLINICAL HISTORY:  Stroke;    TECHNIQUE:  Single frontal  view of the chest was performed.    COMPARISON:  04/12/2012    FINDINGS:  Cardiomediastinal contour is within normal limits.  No pneumothorax or pleural effusions.  Lungs are clear.                               MRI Brain Without Contrast (Final result)  Result time 09/08/19 14:54:12    Final result by Fermin Terrazas MD (09/08/19 14:54:12)                 Impression:      Small focus of diffusion restriction in the left corona radiata, consistent with acute infarction.    Extensive lacunar type infarctions in the basal ganglia, thalami, and in the blayne.    Unchanged appearance of scattered foci of old microhemorrhages in the supratentorial brain and blayne.      Electronically signed by: Fermin Terrazas MD  Date:    09/08/2019  Time:    14:54             Narrative:    EXAMINATION:  MRI BRAIN WITHOUT CONTRAST    CLINICAL HISTORY:  Stroke;    TECHNIQUE:  Multiplanar multisequence MR imaging of the brain was performed without contrast.    COMPARISON:  CT scan of the head dated 09/08/2019 and MRI brain dated 12/19/2018.    FINDINGS:  The craniocervical junction is within normal limits.  The sellar and parasellar structures are within normal limits.  The intracranial flow voids are within normal limits.    There is a small focus of acute infarction in the left corona radiata.  There are extensive old lacunar type infarctions in the basal ganglia thalami, and in the blayne.  The ventricles and sulci are prominent, suggestive of cerebral volume loss.  There are no extra-axial fluid collections.  There are extensive T2/FLAIR signal hyperintense signal within the periventricular and subcortical white matter.  There are changes of chronic microhemorrhage in the right caudate head, right corona radiata, and in the left thalamus.  There also foci of old microhemorrhages the left subthalamic nuclei and in the blayne.  There is no evidence of mass effect.    The orbits and intraorbital contents are unremarkable.  There are minimal  secretions within the ethmoid sinuses.  The mastoid air cells are clear.  The calvarium is intact.                                CTA STROKE MULTI-PHASE (Final result)  Result time 09/08/19 14:04:09   Procedure changed from CTA Brain     Final result by Danny Day MD (09/08/19 14:04:09)                 Impression:      No acute major vascular distribution infarction, significant mass effect, or intraparenchymal hemorrhage.    Moderate amount of scattered white matter disease, most consistent with chronic microvascular ischemic change.    No arterial occlusion identified.  Diminutive distal left vertebral artery and diminutive P1 PCA segments bilaterally.  Prominent posterior communicating arteries noted, normal variant.    50% stenosis of the left ICA origin.    This report was flagged in Epic as abnormal.    Prelim findings were discussed with Dr. Moser with the stroke team at 13:19.    Electronically signed by resident: Zhane Urban MD  Date:    09/08/2019  Time:    13:24    Electronically signed by: Danny Day MD  Date:    09/08/2019  Time:    14:04             Narrative:    EXAMINATION:  CTA STROKE MULTI-PHASE    CLINICAL HISTORY:  Stroke;    TECHNIQUE:  Non contrast low dose axial images were obtained thought the head. CT angiogram was performed from the level of the andre to the top of the head following the IV administration of 100mL of Omnipaque 350.   Sagittal and coronal reconstructions and maximum intensity projection reconstructions were performed. Arterial stenosis percentages are based on NASCET measurement criteria.  Two additional phases of immediate post-contrast CTA images were performed through the head alone.    COMPARISON:  CT head 12/18/2018.  MRI brain 12/19/2018.    FINDINGS:  Intracranial Compartment:    Probable remote lacunar type infarcts in the  right blayne and the right thalamus.  Moderate patchy periventricular white matter hypoattenuation consistent with chronic ischemic  microvascular disease. No parenchymal mass, hemorrhage, edema, or major vascular distribution infarct.    Ventricles and sulci are normal in size for age without evidence of hydrocephalus. No extra-axial blood or fluid collections.    Skull/Extracranial Contents (limited evaluation): No fracture. Mastoid air cells and paranasal sinuses are essentially clear.    Non-Vascular Structures of the Neck/Thoracic Inlet (limited evaluation): Normal.    Aorta: Normal 3 vessel arch.    Extracranial carotid circulation: Calcific atherosclerosis of bilateral common carotid arteries at the bifurcation resulting in approximately 50% stenosis of the left internal carotid artery origin.  Less than 50% stenosis of the proximal right ICA.  No aneurysmal dilatation, or dissection.    Extracranial vertebral circulation: Small caliber of the vertebral arteries.  No hemodynamically significant stenosis, aneurysmal dilatation, or dissection.    Intracranial Arteries: Small caliber the left MCA likely related to atherosclerotic disease.  Diminutive caliber of the distal basilar artery and both P1 PCA arteries.  Prominent posterior communicating arteries noted.  No occlusion or aneurysm.  No vascular malformation.    Venous structures (limited evaluation): Normal.                                 Medical Decision Making:   History:   Old Medical Records: I decided to obtain old medical records.  Initial Assessment:   Afebrile, atraumatic and hemodynamically stable female presents with acute slurring of speech warranting stroke activation emergent neurology evaluation.  Currently in CT.  Will continue close observation fall in the ED.  ____________________  Polo Sandoval MD, [facility]  Emergency Medicine Staff  1:21 PM 9/8/2019    Clinical Tests:   Lab Tests: Ordered  Radiological Study: Ordered  Medical Tests: Ordered              Attending Attestation:         Attending Critical Care:   Critical Care Times:   Direct Patient Care (initial  evaluation, reassessments, and time considering the case)................................................................20 minutes.   Additional History from reviewing old medical records or taking additional history from the family, EMS, PCP, etc.......................5 minutes.   Ordering, Reviewing, and Interpreting Diagnostic Studies...............................................................................................................5 minutes.   Documentation..................................................................................................................................................................................5 minutes.   Consultation with the patient's family directly relating to the patient's condition, care, and DNR status (when patient unable)......5 minutes.   ==============================================================  · Total Critical Care Time - exclusive of procedural time: 40 minutes.  ==============================================================  Critical care was necessary to treat or prevent imminent or life-threatening deterioration of the following conditions: stroke.   Critical care was time spent personally by me on the following activities: obtaining history from patient or relative, examination of patient, review of x-rays / CT sent with the patient, review of old charts, development of treatment plan with patient or relative, discussion with consultants and re-evaluation of patient's conition.   Critical Care Condition: critical                  Clinical Impression:       ICD-10-CM ICD-9-CM   1. Stroke I63.9 434.91   2. Slurring of speech R47.81 784.59   3. Aphasia R47.01 784.3         Disposition:   Disposition: Admitted  Condition: Fair                        Darryl Sandoval MD  09/08/19 1535

## 2019-09-08 NOTE — ED TRIAGE NOTES
"Pt is a 63 year old female admitted as a Stroke code for slurred speech that began around 0900 this morning while she was getting ready for [employer]. Pt states she has a headache and her head feels "heavy" and rates her headache pain a 7/10. Pt states she also has back pain and takes 10 mg oxycodone at home for sciatic pain. Pt denies chest pain, abdominal pain, shortness of breath, numbness or tingling in both arms and legs, nausea, vomiting, or loss of consciousness.  "

## 2019-09-08 NOTE — SUBJECTIVE & OBJECTIVE
Past Medical History:   Diagnosis Date    Arthritis     Back pain with sciatica     Diabetes mellitus     Hypertension     Stroke      No past surgical history on file.  Family History   Problem Relation Age of Onset    Cancer Brother      Social History     Tobacco Use    Smoking status: Current Some Day Smoker     Packs/day: 0.50     Years: 40.00     Pack years: 20.00     Types: Cigarettes    Smokeless tobacco: Never Used   Substance Use Topics    Alcohol use: No     Comment: Recently stopped    Drug use: No     Review of patient's allergies indicates:  No Known Allergies    Medications: I have reviewed the current medication administration record.      (Not in a hospital admission)    Review of Systems   Constitutional: Negative for activity change, appetite change, chills, diaphoresis and fatigue.   HENT: Negative for sneezing, sore throat and tinnitus.    Gastrointestinal: Negative for abdominal pain, anal bleeding, blood in stool, constipation and diarrhea.   Neurological: Positive for speech difficulty and light-headedness. Negative for dizziness, tremors, seizures, syncope, facial asymmetry, weakness, numbness and headaches.     Objective:     Vital Signs (Most Recent):  Temp: 98.4 °F (36.9 °C) (09/08/19 1306)  Pulse: (!) 58 (09/08/19 1348)  Resp: 18 (09/08/19 1348)  BP: (!) 145/69 (09/08/19 1348)  SpO2: 97 % (09/08/19 1348)    Vital Signs Range (Last 24H):  Temp:  [98.4 °F (36.9 °C)]   Pulse:  [48-58]   Resp:  [18-20]   BP: (144-165)/(69-83)   SpO2:  [97 %-100 %]     Physical Exam   Constitutional: She is oriented to person, place, and time.   Cardiovascular: Normal rate, regular rhythm and normal heart sounds. Exam reveals no gallop and no friction rub.   No murmur heard.  Pulmonary/Chest: Effort normal and breath sounds normal. No stridor. No respiratory distress. She has no wheezes. She has no rales. She exhibits no tenderness.   Abdominal: Soft. Bowel sounds are normal. She exhibits no  distension and no mass. There is no tenderness. There is no guarding.   Neurological: She is alert and oriented to person, place, and time. No cranial nerve deficit.       Neurological Exam:   LOC: alert  Attention Span: Good   Language: No aphasia, Other: Mild aphasia which is resolving   Articulation: Dysarthria  Orientation: Person, Place, Time   Visual Fields: Full  EOM (CN III, IV, VI): Full/intact  Pupils (CN II, III): PERRL  Facial Sensation (CN V): Normal  Facial Movement (CN VII): Symmetric facial expression    Reflexes: 2+ throughout  Motor: Arm left  Normal 5/5  Leg left  Normal 5/5  Arm right  Normal 5/5  Leg right Normal 5/5  Cebellar: No evidence of appendicular or axial ataxia  Sensation: Intact to light touch, temperature and vibration  Tone: Normal tone throughout      Laboratory:  CMP:   Recent Labs   Lab 09/08/19  1330   CALCIUM 9.1   ALBUMIN 3.5   PROT 6.8      K 3.2*   CO2 26      BUN 7*   CREATININE 0.8   ALKPHOS 55   ALT 11   AST 12   BILITOT 0.5     CBC:   Recent Labs   Lab 09/08/19  1330   WBC 8.33   RBC 3.88*   HGB 12.5   HCT 35.6*      MCV 92   MCH 32.2*   MCHC 35.1     Lipid Panel:   Recent Labs   Lab 09/08/19  1330   CHOL 165   LDLCALC 111.6   HDL 42   TRIG 57     Coagulation:   Recent Labs   Lab 09/08/19  1330   INR 1.1     Hgb A1C: No results for input(s): HGBA1C in the last 168 hours.  TSH: No results for input(s): TSH in the last 168 hours.    Diagnostic Results:      Brain imaging:  MRI pending    Vessel Imaging:      No acute major vascular distribution infarction, significant mass effect, or intraparenchymal hemorrhage.    Moderate amount of scattered white matter disease, most consistent with chronic microvascular ischemic change.    No arterial occlusion identified.  Diminutive distal left vertebral artery and diminutive P1 PCA segments bilaterally.  Prominent posterior communicating arteries noted, normal variant.    50% stenosis of the left ICA  origin.    Cardiac Evaluation:   TTE 12/2019   · Normal left ventricular systolic function. The estimated ejection fraction is 65%  · No wall motion abnormalities.  · Grade I (mild) left ventricular diastolic dysfunction consistent with impaired relaxation.  · Normal right ventricular systolic function.

## 2019-09-08 NOTE — ED TRIAGE NOTES
Patient arrives EMS from [facility] after sudden onset new slurred speech at 0900 - no improvement over 4 hours, so family called EMS - patient also has numbness to left side of face - no droop noted - no drift noted   -Test BG q6h while NPO  -If hypoglycemic decrease Lantus dose to 10 units at 12noon.  -Decrease Humalog correction scale to low dose q6h  -Contact endo team with any changes on PO status.  -Start D5 or D10 infusion if pt hypoglycemic to maintain BG >100s (Hypoglycemia might be caused by lantus and will need to decrease dose to 10 units instead of 12units).   -Plan discussed with team.  Contact info: [phone] (24/7). pager [phone]

## 2019-09-08 NOTE — CHAPLAIN
Visited pt per Charge Nurse. She is doing well, but hungry; said she's had strokes before; prayed together.

## 2019-09-08 NOTE — ASSESSMENT & PLAN NOTE
66 y/o female with a medical history of prior CVAs (most recent stroke was in 12/2018) in the past, HTN and HLD was brought in by the EMS for slurred speech. Last known normal was 9 AM. Patient went to [employer] and came back home. When speaking to her daughter over the phone, she noticed that she was slurring her words. This prompted her to call EMS. Upon arrival CTA was done which did not show any acute major vascular distribution infarction, significant mass effect, or intraparenchymal hemorrhage. No LVO noted. There was chronic microvascular ischemic changes.  Diminutive distal left vertebral artery and diminutive P1 PCA segments bilaterally (which was also seen in her previous MRA in [facility]). Prominent posterior communicating arteries noted, normal variant. 50% stenosis of the left ICA origin. Patient stated that she took 10 mg of oxycodone for her back pain before symptoms started. She is also on tramadol and trazadone for pain as well.     Differential Diagnosis  Based on her symptoms, most likely the slurred speech could be medication induced aphasia (took oxycodone and is on tramadol and trazadone). Other reasons for her slurred speech can be a recrudescence of her old stroke due to another etiology, such as dehydration and hypokalemia (as seen in this patient). As patient only has apahsia, unclear in which territory the vascular infarct may be. Could possibly be in the MCA region however, we would also see other deficits as well which this patient does not have. Must obtain an MRI to rule out a vascular etiology.    Plan  1) Obtain MRI brain w/o contrast. If it does not show acute infarct will not admit to stroke service.   2) Vascular surgery consultation as an outpatient for symptomatic carotid stenosis.

## 2019-09-08 NOTE — ASSESSMENT & PLAN NOTE
Although patient has acute infarction in the left corona radiata, vascular etiology does not explain the symptoms. This could have been a combination of drug induced, and hypokalemia with an acute infarction.

## 2019-09-09 VITALS
RESPIRATION RATE: 20 BRPM | HEIGHT: 69 IN | OXYGEN SATURATION: 97 % | HEART RATE: 58 BPM | SYSTOLIC BLOOD PRESSURE: 152 MMHG | TEMPERATURE: 98 F | DIASTOLIC BLOOD PRESSURE: 85 MMHG | BODY MASS INDEX: 27.36 KG/M2 | WEIGHT: 184.75 LBS

## 2019-09-09 PROBLEM — R47.1 DYSARTHRIA: Status: ACTIVE | Noted: 2019-09-08

## 2019-09-09 PROBLEM — G93.6 CYTOTOXIC CEREBRAL EDEMA: Status: ACTIVE | Noted: 2019-09-09

## 2019-09-09 PROBLEM — E87.6 HYPOKALEMIA: Status: RESOLVED | Noted: 2019-09-08 | Resolved: 2019-09-09

## 2019-09-09 LAB
ALBUMIN SERPL BCP-MCNC: 3.7 G/DL (ref 3.5–5.2)
ALP SERPL-CCNC: 59 U/L (ref 55–135)
ALT SERPL W/O P-5'-P-CCNC: 10 U/L (ref 10–44)
ANION GAP SERPL CALC-SCNC: 13 MMOL/L (ref 8–16)
APTT BLDCRRT: 25.6 SEC (ref 21–32)
AST SERPL-CCNC: 14 U/L (ref 10–40)
AV INDEX (PROSTH): 0.84
AV MEAN GRADIENT: 4 MMHG
AV PEAK GRADIENT: 9 MMHG
AV VALVE AREA: 1.58 CM2
AV VELOCITY RATIO: 0.72
BASOPHILS # BLD AUTO: 0.03 K/UL (ref 0–0.2)
BASOPHILS NFR BLD: 0.4 % (ref 0–1.9)
BILIRUB SERPL-MCNC: 0.4 MG/DL (ref 0.1–1)
BSA FOR ECHO PROCEDURE: 2.02 M2
BUN SERPL-MCNC: 10 MG/DL (ref 8–23)
CALCIUM SERPL-MCNC: 9.7 MG/DL (ref 8.7–10.5)
CHLORIDE SERPL-SCNC: 104 MMOL/L (ref 95–110)
CK MB SERPL-MCNC: 0.7 NG/ML (ref 0.1–6.5)
CK MB SERPL-RTO: 1.8 % (ref 0–5)
CK SERPL-CCNC: 40 U/L (ref 20–180)
CO2 SERPL-SCNC: 25 MMOL/L (ref 23–29)
CREAT SERPL-MCNC: 0.8 MG/DL (ref 0.5–1.4)
CV ECHO LV RWT: 0.46 CM
DIFFERENTIAL METHOD: ABNORMAL
DOP CALC AO PEAK VEL: 1.48 M/S
DOP CALC AO VTI: 32.34 CM
DOP CALC LVOT AREA: 1.9 CM2
DOP CALC LVOT DIAMETER: 1.55 CM
DOP CALC LVOT PEAK VEL: 1.07 M/S
DOP CALC LVOT STROKE VOLUME: 51.24 CM3
DOP CALCLVOT PEAK VEL VTI: 27.17 CM
E WAVE DECELERATION TIME: 170.86 MSEC
E/A RATIO: 1.11
E/E' RATIO: 8.57 M/S
ECHO LV POSTERIOR WALL: 0.88 CM (ref 0.6–1.1)
EOSINOPHIL # BLD AUTO: 0.2 K/UL (ref 0–0.5)
EOSINOPHIL NFR BLD: 2.5 % (ref 0–8)
ERYTHROCYTE [DISTWIDTH] IN BLOOD BY AUTOMATED COUNT: 11.8 % (ref 11.5–14.5)
EST. GFR  (AFRICAN AMERICAN): >60 ML/MIN/1.73 M^2
EST. GFR  (NON AFRICAN AMERICAN): >60 ML/MIN/1.73 M^2
ESTIMATED AVG GLUCOSE: 94 MG/DL (ref 68–131)
FRACTIONAL SHORTENING: 32 % (ref 28–44)
GLUCOSE SERPL-MCNC: 107 MG/DL (ref 70–110)
HBA1C MFR BLD HPLC: 4.9 % (ref 4–5.6)
HCT VFR BLD AUTO: 39.1 % (ref 37–48.5)
HGB BLD-MCNC: 13.6 G/DL (ref 12–16)
IMM GRANULOCYTES # BLD AUTO: 0.01 K/UL (ref 0–0.04)
IMM GRANULOCYTES NFR BLD AUTO: 0.1 % (ref 0–0.5)
INR PPP: 1 (ref 0.8–1.2)
INTERVENTRICULAR SEPTUM: 0.87 CM (ref 0.6–1.1)
LA MAJOR: 5.32 CM
LA MINOR: 5.54 CM
LA WIDTH: 3.36 CM
LEFT ATRIUM SIZE: 2.57 CM
LEFT ATRIUM VOLUME INDEX: 20 ML/M2
LEFT ATRIUM VOLUME: 39.84 CM3
LEFT INTERNAL DIMENSION IN SYSTOLE: 2.58 CM (ref 2.1–4)
LEFT VENTRICLE DIASTOLIC VOLUME INDEX: 31.44 ML/M2
LEFT VENTRICLE DIASTOLIC VOLUME: 62.72 ML
LEFT VENTRICLE MASS INDEX: 49 G/M2
LEFT VENTRICLE SYSTOLIC VOLUME INDEX: 12.1 ML/M2
LEFT VENTRICLE SYSTOLIC VOLUME: 24.22 ML
LEFT VENTRICULAR INTERNAL DIMENSION IN DIASTOLE: 3.82 CM (ref 3.5–6)
LEFT VENTRICULAR MASS: 98 G
LV LATERAL E/E' RATIO: 7.5 M/S
LV SEPTAL E/E' RATIO: 10 M/S
LYMPHOCYTES # BLD AUTO: 3.2 K/UL (ref 1–4.8)
LYMPHOCYTES NFR BLD: 41.9 % (ref 18–48)
MAGNESIUM SERPL-MCNC: 1.9 MG/DL (ref 1.6–2.6)
MCH RBC QN AUTO: 32 PG (ref 27–31)
MCHC RBC AUTO-ENTMCNC: 34.8 G/DL (ref 32–36)
MCV RBC AUTO: 92 FL (ref 82–98)
MONOCYTES # BLD AUTO: 0.6 K/UL (ref 0.3–1)
MONOCYTES NFR BLD: 8.4 % (ref 4–15)
MV PEAK A VEL: 0.81 M/S
MV PEAK E VEL: 0.9 M/S
NEUTROPHILS # BLD AUTO: 3.6 K/UL (ref 1.8–7.7)
NEUTROPHILS NFR BLD: 46.7 % (ref 38–73)
NRBC BLD-RTO: 0 /100 WBC
PHOSPHATE SERPL-MCNC: 3.7 MG/DL (ref 2.7–4.5)
PLATELET # BLD AUTO: 244 K/UL (ref 150–350)
PMV BLD AUTO: 10.3 FL (ref 9.2–12.9)
POCT GLUCOSE: 120 MG/DL (ref 70–110)
POCT GLUCOSE: 145 MG/DL (ref 70–110)
POTASSIUM SERPL-SCNC: 3.6 MMOL/L (ref 3.5–5.1)
PROT SERPL-MCNC: 7.1 G/DL (ref 6–8.4)
PROTHROMBIN TIME: 10.6 SEC (ref 9–12.5)
PULM VEIN S/D RATIO: 1.62
PV PEAK D VEL: 0.42 M/S
PV PEAK S VEL: 0.68 M/S
RA MAJOR: 5.79 CM
RA PRESSURE: 3 MMHG
RA WIDTH: 3.16 CM
RBC # BLD AUTO: 4.25 M/UL (ref 4–5.4)
RIGHT VENTRICULAR END-DIASTOLIC DIMENSION: 2.55 CM
SINUS: 3.01 CM
SODIUM SERPL-SCNC: 142 MMOL/L (ref 136–145)
TDI LATERAL: 0.12 M/S
TDI SEPTAL: 0.09 M/S
TDI: 0.11 M/S
TRICUSPID ANNULAR PLANE SYSTOLIC EXCURSION: 2.15 CM
TROPONIN I SERPL DL<=0.01 NG/ML-MCNC: <0.006 NG/ML (ref 0–0.03)
WBC # BLD AUTO: 7.63 K/UL (ref 3.9–12.7)

## 2019-09-09 PROCEDURE — 82550 ASSAY OF CK (CPK): CPT

## 2019-09-09 PROCEDURE — 85730 THROMBOPLASTIN TIME PARTIAL: CPT

## 2019-09-09 PROCEDURE — 99233 SBSQ HOSP IP/OBS HIGH 50: CPT | Mod: GC,,, | Performed by: PSYCHIATRY & NEUROLOGY

## 2019-09-09 PROCEDURE — 36415 COLL VENOUS BLD VENIPUNCTURE: CPT

## 2019-09-09 PROCEDURE — 97161 PT EVAL LOW COMPLEX 20 MIN: CPT

## 2019-09-09 PROCEDURE — 92610 EVALUATE SWALLOWING FUNCTION: CPT

## 2019-09-09 PROCEDURE — 83735 ASSAY OF MAGNESIUM: CPT

## 2019-09-09 PROCEDURE — 97165 OT EVAL LOW COMPLEX 30 MIN: CPT

## 2019-09-09 PROCEDURE — 85610 PROTHROMBIN TIME: CPT

## 2019-09-09 PROCEDURE — 63600175 PHARM REV CODE 636 W HCPCS: Performed by: STUDENT IN AN ORGANIZED HEALTH CARE EDUCATION/TRAINING PROGRAM

## 2019-09-09 PROCEDURE — 83036 HEMOGLOBIN GLYCOSYLATED A1C: CPT

## 2019-09-09 PROCEDURE — 25000003 PHARM REV CODE 250: Performed by: STUDENT IN AN ORGANIZED HEALTH CARE EDUCATION/TRAINING PROGRAM

## 2019-09-09 PROCEDURE — 85025 COMPLETE CBC W/AUTO DIFF WBC: CPT

## 2019-09-09 PROCEDURE — 97530 THERAPEUTIC ACTIVITIES: CPT

## 2019-09-09 PROCEDURE — 99233 PR SUBSEQUENT HOSPITAL CARE,LEVL III: ICD-10-PCS | Mod: GC,,, | Performed by: PSYCHIATRY & NEUROLOGY

## 2019-09-09 PROCEDURE — 99222 PR INITIAL HOSPITAL CARE,LEVL II: ICD-10-PCS | Mod: ,,, | Performed by: NURSE PRACTITIONER

## 2019-09-09 PROCEDURE — 93880 EXTRACRANIAL BILAT STUDY: CPT | Performed by: SURGERY

## 2019-09-09 PROCEDURE — 25000003 PHARM REV CODE 250: Performed by: PHYSICIAN ASSISTANT

## 2019-09-09 PROCEDURE — 80053 COMPREHEN METABOLIC PANEL: CPT

## 2019-09-09 PROCEDURE — 84484 ASSAY OF TROPONIN QUANT: CPT

## 2019-09-09 PROCEDURE — 84100 ASSAY OF PHOSPHORUS: CPT

## 2019-09-09 PROCEDURE — 82553 CREATINE MB FRACTION: CPT

## 2019-09-09 PROCEDURE — 92523 SPEECH SOUND LANG COMPREHEN: CPT

## 2019-09-09 PROCEDURE — 99222 1ST HOSP IP/OBS MODERATE 55: CPT | Mod: ,,, | Performed by: NURSE PRACTITIONER

## 2019-09-09 RX ORDER — OXYBUTYNIN CHLORIDE 5 MG/1
5 TABLET, EXTENDED RELEASE ORAL DAILY
Status: DISCONTINUED | OUTPATIENT
Start: 2019-09-09 | End: 2019-09-09 | Stop reason: HOSPADM

## 2019-09-09 RX ORDER — CLOPIDOGREL BISULFATE 75 MG/1
75 TABLET ORAL DAILY
Qty: 30 TABLET | Refills: 0 | Status: SHIPPED | OUTPATIENT
Start: 2019-09-10 | End: 2020-09-09

## 2019-09-09 RX ORDER — ASPIRIN 81 MG/1
81 TABLET ORAL DAILY
Refills: 0 | COMMUNITY
Start: 2019-09-10 | End: 2020-09-09

## 2019-09-09 RX ORDER — OXYBUTYNIN CHLORIDE 5 MG/1
5 TABLET, EXTENDED RELEASE ORAL DAILY
Qty: 30 TABLET | Refills: 11
Start: 2019-09-10 | End: 2020-09-09

## 2019-09-09 RX ORDER — ATORVASTATIN CALCIUM 80 MG/1
80 TABLET, FILM COATED ORAL DAILY
Qty: 30 TABLET | Refills: 0 | Status: SHIPPED | OUTPATIENT
Start: 2019-09-09 | End: 2020-09-08

## 2019-09-09 RX ADMIN — HEPARIN SODIUM 5000 UNITS: 5000 INJECTION, SOLUTION INTRAVENOUS; SUBCUTANEOUS at 01:09

## 2019-09-09 RX ADMIN — CLOPIDOGREL BISULFATE 75 MG: 75 TABLET ORAL at 09:09

## 2019-09-09 RX ADMIN — ATORVASTATIN CALCIUM 80 MG: 20 TABLET, FILM COATED ORAL at 09:09

## 2019-09-09 RX ADMIN — HEPARIN SODIUM 5000 UNITS: 5000 INJECTION, SOLUTION INTRAVENOUS; SUBCUTANEOUS at 05:09

## 2019-09-09 RX ADMIN — ASPIRIN 81 MG: 81 TABLET, COATED ORAL at 09:09

## 2019-09-09 RX ADMIN — OXYBUTYNIN CHLORIDE 5 MG: 5 TABLET, EXTENDED RELEASE ORAL at 01:09

## 2019-09-09 NOTE — PT/OT/SLP EVAL
"Speech Language Pathology Evaluation  Cognitive/Bedside Swallow    Patient Name:  Kaila Scott   MRN:  [id number]  Admitting Diagnosis: Cerebral infarction due to thrombosis of left middle cerebral artery    Recommendations:                  General Recommendations:  Dysphagia therapy, Speech/language therapy and Cognitive-linguistic therapy  Diet recommendations:  Regular, Thin   Aspiration Precautions: Strict aspiration precautions   General Precautions: Standard, aspiration, fall  Communication strategies:  provide increased time to answer and go to room if call light pushed    History:     Past Medical History:   Diagnosis Date    Arthritis     Back pain with sciatica     Diabetes mellitus     Hypertension     Stroke        History reviewed. No pertinent surgical history.    Social History: Patient lives alone, retired, indpt pta, enjoys visiting with her neighbors and exercising daily.      Subjective     "It was really slurred yesterday."    Pain/Comfort:  · Pain Rating 1: 0/10  · Pain Rating Post-Intervention 1: 0/10    Objective:     Cognitive Status:    Orientation Oriented x4  Memory Immediate Recall 100% and Delayed0/3 unassociated words recalled after delay  Problem Solving Categories 100%, Sequencing 100%, Solutions 100% and Compare/contrast 50%      Receptive Language:   Comprehension:   WFL    Pragmatics:    WFL    Expressive Language:  Verbal:    fluent and appropriate at sentence level      Motor Speech:  Dysarthria mod    Voice:   loud intensity, clear    Visual-Spatial:  tba    Reading:   tba     Written Expression:   tba    Oral Musculature Evaluation  · Oral Musculature: general weakness  · Dentition: upper dentures  · Secretion Management: adequate  · Mucosal Quality: adequate  · Oral Labial Strength and Mobility: WFL  · Lingual Strength and Mobility: impaired strength(impaired coordination)  · Volitional Cough: WFL  · Volitional Swallow: mildly decreased hyolaryngeal rise palpated  · Voice " Prior to PO Intake: clear    Bedside Swallow Eval:   Consistencies Assessed:  · Thin liquids 4 oz via cup/straw  · Puree 1/2 container of pudding  · Solids 1 phyllis cracker     Oral Phase:   · Impaired rotational chew  · Pocketing   Left mild   · Liquid wash to clear    Pharyngeal Phase:   · no overt clinical signs/symptoms of aspiration    Compensatory Strategies  · None    Treatment: Education provided re: role of SLP, results of evaluation, diet recs, swallow precs, and POC.  Pt in agreement.     Assessment:     Kaila Scott is a 63 y.o. female with an SLP diagnosis of Dysarthria, mild oral dysphagia.     Goals:   Multidisciplinary Problems     SLP Goals        Problem: SLP Goal    Goal Priority Disciplines Outcome   SLP Goal     SLP Ongoing (interventions implemented as appropriate)   Description:  Speech Language Pathology Goals  Goals expected to be met by 9/16  1. Pt will tolerate regular diet with thin liquids with adequate oral clearance and without overt s/s aspiration.   2. Pt will utilize simple speech strategies at phrase level with 80% intelligibility given min cues.  3. Pt will complete further assessment of higher level cognition, reading, writing and visual spatial skills to determine need for tx.                         Plan:     · Patient to be seen:  4 x/week   · Plan of Care expires:  10/09/19  · Plan of Care reviewed with:  patient   · SLP Follow-Up:  Yes       Discharge recommendations:  Discharge Facility/Level of Care Needs: outpatient speech therapy, home health speech therapy(pending pt preference)   Barriers to Discharge:  Level of Skilled Assistance Needed      Time Tracking:     SLP Treatment Date:   09/09/19  Speech Start Time:  0821  Speech Stop Time:  0838     Speech Total Time (min):  17 min    Billable Minutes: Eval 9  and Eval Swallow and Oral Function 8    MARYANNE Dewey, CCC-SLP  09/09/2019

## 2019-09-09 NOTE — SUBJECTIVE & OBJECTIVE
Neurologic Chief Complaint: L MCA infarct    Subjective:     Interval History: Patient is seen for follow-up neurological assessment and treatment recommendations: JONATHAN, reports continued speech difficulty but improved when compared to yesterday    HPI, Past Medical, Family, and Social History remains the same as documented in the initial encounter.     Review of Systems   Constitutional: Negative for chills and fever.   Respiratory: Negative for cough.    Cardiovascular: Negative for chest pain.   Gastrointestinal: Negative for nausea and vomiting.   Neurological: Positive for facial asymmetry and speech difficulty. Negative for weakness and numbness.     Scheduled Meds:   aspirin  81 mg Oral Daily    atorvastatin  80 mg Oral Daily    clopidogrel  75 mg Oral Daily    heparin (porcine)  5,000 Units Subcutaneous Q8H    polyethylene glycol  17 g Oral Daily    senna-docusate 8.6-50 mg  1 tablet Oral BID     Continuous Infusions:   sodium chloride 0.9%       PRN Meds:acetaminophen, Dextrose 10% Bolus, Dextrose 10% Bolus, glucagon (human recombinant), glucose, glucose, insulin aspart U-100, labetalol, ondansetron, ondansetron, ramelteon, sodium chloride 0.9%, sodium chloride 0.9%    Objective:     Vital Signs (Most Recent):  Temp: 97 °F (36.1 °C) (09/09/19 0751)  Pulse: (!) 45 (09/09/19 0753)  Resp: 20 (09/09/19 0751)  BP: (!) 172/79 (09/09/19 0751)  SpO2: 98 % (09/09/19 0751)  BP Location: Left arm    Vital Signs Range (Last 24H):  Temp:  [97 °F (36.1 °C)-98.8 °F (37.1 °C)]   Pulse:  [45-68]   Resp:  [14-20]   BP: (135-172)/(62-90)   SpO2:  [95 %-100 %]   BP Location: Left arm    Physical Exam   Constitutional: She is oriented to person, place, and time. She appears well-developed and well-nourished. No distress.   HENT:   Head: Normocephalic and atraumatic.   Cardiovascular: Bradycardia present.   Pulmonary/Chest: Effort normal. No respiratory distress.   Neurological: She is alert and oriented to person,  place, and time.   Skin: Skin is warm and dry.   Vitals reviewed.      Neurological Exam:   LOC: alert  Attention Span: Good   Language: No aphasia  Articulation: Dysarthria  Orientation: Person, Place, Time   Visual Fields: Full  EOM (CN III, IV, VI): Full/intact  Facial Movement (CN VII): Lower facial weakness on the Right  Motor: Arm left  Normal 5/5  Leg left  Normal 5/5  Arm right  Normal 5/5  Leg right Normal 5/5  Cebellar: No evidence of appendicular or axial ataxia  Sensation: Intact to light touch, temperature and vibration  Tone: Normal tone throughout    Laboratory:  CMP:   Recent Labs   Lab 09/09/19  0346   CALCIUM 9.7   ALBUMIN 3.7   PROT 7.1      K 3.6   CO2 25      BUN 10   CREATININE 0.8   ALKPHOS 59   ALT 10   AST 14   BILITOT 0.4     CBC:   Recent Labs   Lab 09/09/19  0346   WBC 7.63   RBC 4.25   HGB 13.6   HCT 39.1      MCV 92   MCH 32.0*   MCHC 34.8     Lipid Panel:   Recent Labs   Lab 09/08/19  1330   CHOL 165   LDLCALC 111.6   HDL 42   TRIG 57     Coagulation:   Recent Labs   Lab 09/09/19  0346   INR 1.0   APTT 25.6     Platelet Aggregation Study: No results for input(s): PLTAGG, PLTAGINTERP, PLTAGREGLACO, ADPPLTAGGREG in the last 168 hours.  Hgb A1C:   Recent Labs   Lab 09/09/19  0346   HGBA1C 4.9     TSH:   Recent Labs   Lab 09/08/19  1330   TSH 0.747       Diagnostic Results     Brain Imaging   MRI Brain. Date: 09/08/19  Small focus of diffusion restriction in the left corona radiata, consistent with acute infarction.    Extensive lacunar type infarctions in the basal ganglia, thalami, and in the blayne.    Unchanged appearance of scattered foci of old microhemorrhages in the supratentorial brain and blayne.    Vessel Imaging   CTA Multiphase. Date: 09/08/19  No acute major vascular distribution infarction, significant mass effect, or intraparenchymal hemorrhage.    Moderate amount of scattered white matter disease, most consistent with chronic microvascular ischemic  change.    No arterial occlusion identified.  Diminutive distal left vertebral artery and diminutive P1 PCA segments bilaterally.  Prominent posterior communicating arteries noted, normal variant.    50% stenosis of the left ICA origin.    Cardiac Imaging   Echo pending

## 2019-09-09 NOTE — PT/OT/SLP EVAL
Physical Therapy Evaluation and Discharge Note    Patient Name:  Kaila Scott   MRN:  [id number]    Recommendations:     Discharge Recommendations:  outpatient PT   Discharge Equipment Recommendations: none   Barriers to discharge: None    Assessment:     Kaila Scott is a 63 y.o. female admitted with a medical diagnosis of Cerebral infarction due to thrombosis of left middle cerebral artery. Pt states that prior to admin that she was independent with all activities and did not require an AD. Pt demonstrated good functional strength and mobility throughout today's tx session. Pt required verbal cueing to maintain heel to toe gait pattern and maintain normal stance width. At this time, patient is functioning at their prior level of function and does not require further acute PT services.     Recent Surgery: * No surgery found *      Plan:     During this hospitalization, patient does not require further acute PT services.  Please re-consult if situation changes.      Subjective     Chief Complaint: Pt had no complaints during tx session  Pain/Comfort:  · Pain Rating 1: 0/10    Patients cultural, spiritual, Orthodox conflicts given the current situation: no    Living Environment:  Pt lives in [facility] with 4 FIGUEROA. Walk-in shower with shower chair and grab bars  Prior to admission, patients level of function was independent with all activities and did not require an AD.  Equipment used at home: none.  DME owned (not currently used): rolling walker.  Upon discharge, patient will have assistance from daughter and friends.    Objective:     Communicated with RN prior to session.  Patient found up in chair with peripheral IV, telemetry upon PT entry to room.    General Precautions: Standard, fall   Orthopedic Precautions:N/A   Braces: N/A     Exams:  · Cognitive Exam:  Patient is alert and oriented and able to follow commands promptly  · Sensation:    -       Intact  · RLE ROM: WFL  · RLE Strength: WFL  · LLE ROM: WFL  · LLE  Strength: WFL    Functional Mobility:    Bed Mobility:   · Not performed during tx session    Transfers:   · Sit <> Stand Transfer: Independent from chair without an AD     Standing Balance:   · Assistance level: Independent without an AD   · Postural deviation(s) noted: Pt had no LOB with narrow ENRIQUE eyes open and closed standing balance                 Gait:  · Distance: Pt ambulated 250ft with SBA without an AD. Pt demonstrated wide ENRIQUE gait pattern and required verbal cueing to initiate heel to toe gait pattern    AM-PAC 6 CLICK MOBILITY  Total Score:24       Therapeutic Activities and Exercises:   Education: Pt educated on proper technique during transfers. Pt educated on safety with mobility       AM-PAC 6 CLICK MOBILITY  Total Score:24     Patient left up in chair with all lines intact and call button in reach.    GOALS:   Multidisciplinary Problems     Physical Therapy Goals     Not on file          Multidisciplinary Problems (Resolved)        Problem: Physical Therapy Goal    Goal Priority Disciplines Outcome Goal Variances Interventions   Physical Therapy Goal   (Resolved)     PT, PT/OT Outcome(s) achieved                     History:     Past Medical History:   Diagnosis Date    Arthritis     Back pain with sciatica     Diabetes mellitus     Hypertension     Stroke        History reviewed. No pertinent surgical history.    Time Tracking:     PT Received On: 09/09/19  PT Start Time: 0837     PT Stop Time: 0854  PT Total Time (min): 17 min     Billable Minutes: Evaluation 17      JEN Mario  09/09/2019

## 2019-09-09 NOTE — PLAN OF CARE
[facility]    HOME HEALTH ORDERS  FACE TO FACE ENCOUNTER    Patient Name: Kaila Scott  YOB: 1955    PCP: [facility]   PCP Address: [address] / [place] [address]  PCP Phone Number: [phone]  PCP Fax: None    Encounter Date: 09/09/2019    Admit to Home Health    Diagnoses:  Active Hospital Problems    Diagnosis  POA    *Cerebral infarction due to thrombosis of left middle cerebral artery [I63.312]  Yes    Cytotoxic cerebral edema [G93.6]  Yes    Bilateral carotid artery stenosis [I65.23]  Unknown    Dysarthria [R47.1]  Yes    Essential hypertension [I10]  Yes    Type 2 diabetes mellitus without complication [E11.9]  Yes    Tobacco abuse [Z72.0]  Yes    Hyperlipidemia [E78.5]  Yes      Resolved Hospital Problems    Diagnosis Date Resolved POA    Hypokalemia [E87.6] 09/09/2019 Yes       No future appointments.  Follow-up Information     [facility].    Specialties:  Behavioral Health, Psychiatry  Why:  Outpatient Services  Contact information:  [address]  [facility] [address]  [phone]             [facility] VASCULAR NEUROLOGY In 4 weeks.    Specialty:  Vascular Neurology  Why:  Someone will contact you to schedule your stroke follow up appointment. Please contact phone number listed if you do not receive a phone call within 1 week  Contact information:  [address]  [facility] [address]  [phone]                   I have seen and examined this patient face to face today. My clinical findings that support the need for the home health skilled services and home bound status are the following:  Weakness/numbness causing balance and gait disturbance due to Stroke making it taxing to leave home.    Allergies:Review of patient's allergies indicates:  No Known Allergies    Diet: diabetic diet: 2000 calorie    Activities: activity as tolerated    Nursing:   SN to complete  comprehensive assessment including routine vital signs. Instruct on disease process and s/s of complications to report to MD. Review/verify medication list sent home with the patient at time of discharge  and instruct patient/caregiver as needed. Frequency may be adjusted depending on start of care date.    Notify MD if SBP > 160 or < 90; DBP > 90 or < 50; HR > 120 or < 50; Temp > 101; Other:         CONSULTS:    Speech Therapy  to evaluate and treat for  Language, Swallowing and Cognition.    MISCELLANEOUS CARE:  Diabetic Care:   Fingerstick blood sugar AC and HS and Report CBG < 60 or > 350 to physician.    WOUND CARE ORDERS  n/a      Medications: Review discharge medications with patient and family and provide education.      Current Discharge Medication List      START taking these medications    Details   aspirin (ECOTRIN) 81 MG EC tablet Take 1 tablet (81 mg total) by mouth once daily.  Refills: 0      clopidogrel (PLAVIX) 75 mg tablet Take 1 tablet (75 mg total) by mouth once daily.  Qty: 30 tablet, Refills: 0      oxybutynin (DITROPAN-XL) 5 MG TR24 Take 1 tablet (5 mg total) by mouth once daily.  Qty: 30 tablet, Refills: 11         CONTINUE these medications which have NOT CHANGED    Details   atorvastatin (LIPITOR) 80 MG tablet Take 1 tablet (80 mg total) by mouth once daily.  Qty: 90 tablet, Refills: 3      carvedilol (COREG) 25 MG tablet Take 25 mg by mouth 2 (two) times daily with meals.      lisinopril (PRINIVIL,ZESTRIL) 20 MG tablet Take 40 mg by mouth once daily.       loratadine (CLARITIN) 10 mg tablet Take 10 mg by mouth daily as needed for Allergies.      traMADol (ULTRAM) 50 mg tablet Take 1-2 tablets ( mg total) by mouth every 6 (six) hours as needed for Pain.  Qty: 16 tablet, Refills: 0      traZODone (DESYREL) 50 MG tablet Take 50 mg by mouth every evening.      metformin (GLUCOPHAGE) 500 MG tablet Take 1 tablet (500 mg total) by mouth 2 (two) times daily with meals.  Qty: 180 tablet,  Refills: 3      methylPREDNISolone (MEDROL DOSEPACK) 4 mg tablet use as directed  Qty: 1 Package, Refills: 0    Associated Diagnoses: Sacroiliitis             I certify that this patient is confined to her home and needs speech therapy.        Bren Smith PA-C  Vascular Neurology  [phone]

## 2019-09-09 NOTE — PLAN OF CARE
PCP [facility]  Pharmacy   [id number] - [place], [place] - [address]  [address]  [place] [address]  Phone: [phone] Fax: [phone]    [id number] - [place], [place] - [address] AT [facility] [address]  [address]  [place] [address]  Phone: [phone] Fax: [phone]    [id number] - [place], [place] - [address] AT [facility] [address] [place] [address]  [address]  [place] [address]  Phone: [phone] Fax: [phone]       09/09/19 1529   Discharge Assessment   Assessment Type Discharge Planning Assessment   Confirmed/corrected address and phone number on facesheet? Yes   Assessment information obtained from? Patient   Prior to hospitilization cognitive status: Alert/Oriented;No Deficits   Prior to hospitalization functional status: Independent   Current cognitive status: Alert/Oriented;No Deficits   Current Functional Status: Needs Assistance   Lives With alone   Able to Return to Prior Arrangements   (TBD)   Is patient able to care for self after discharge? Unable to determine at this time (comments)   Who are your caregiver(s) and their phone number(s)? Johnny [phone]    Patient's perception of discharge disposition home or selfcare   Readmission Within the Last 30 Days no previous admission in last 30 days   Patient currently being followed by outpatient case management? No   Patient currently receives any other outside agency services? No   Equipment Currently Used at Home none   Do you have any problems affording any of your prescribed medications? No   Is the patient taking medications as prescribed? yes   Does the patient have transportation home? Yes   Transportation Anticipated family or friend will provide   Dialysis Name and Scheduled days N/A   Does the patient receive services at the [facility]? No   Discharge Plan A Home   Discharge Plan B Home with family   DME Needed Upon Discharge  none

## 2019-09-09 NOTE — PLAN OF CARE
09/09/2019      Kaila Milan Herrick  [address] [place] [id number]          Vascular Neurology Dept.  [facility] [address] [place] [address] [phone] after hours   Diagnosis:  Cerebral infarction due to thrombosis of left middle cerebral artery                               Please evaluate and treat patient for her SLP needs.      __________________________  Bren Smith PA-C  09/09/2019

## 2019-09-09 NOTE — HPI
Kaila Scott is a 63-year-old female with PMHx of prior CVAs (most recent stroke was in 12/2018), HTN, and HLD. Patient presented to [facility] on 9/8/19 for slurred speech. CTA done and did not show any acute major vascular distribution infarction, significant mass effect, or intraparenchymal hemorrhage. No LVO noted. 50% L ICA stenosis at origin. MRI showed acute infarction of the left corona radiata. Etiology most likely small vessel disease.     Functional History: Patient lives alone in a 1 story home with 4 steps to enter.  Prior to admission, (I). DME: none.

## 2019-09-09 NOTE — PLAN OF CARE
09/09/19 1557   Post-Acute Status   Post-Acute Authorization Other   Other Status No Post-Acute Service Needs       No SW needs noted.  SW in contact with CM and Medical staff. Will continue to follow and offer support as needed.     Jono Granados LMSW  [facility]   Ext. [phone]

## 2019-09-09 NOTE — NURSING
At 1905, patient arrived to NSU in stable condition via stretcher per transportation x2 assist. AAOx4. Respirations even and unlabored. No s/s of distress noted. Dysarthria noted. Ambulates independently with standby assist. Continent of bowel and bladder. Oriented patient to room and call bell and was able to correctly demonstrate the use of the call bell. Bed locked in lowest position. Call bell within reach. Will continue to monitor.

## 2019-09-09 NOTE — PROGRESS NOTES
[facility]  Vascular Neurology  Comprehensive Stroke Center  Progress Note    Assessment/Plan:     * Cerebral infarction due to thrombosis of left middle cerebral artery  64 y/o female with a medical history of prior CVAs (most recent stroke was in 12/2018) in the past, HTN and HLD was brought in by the EMS for slurred speech. Upon arrival CTA was done which did not show any acute major vascular distribution infarction, significant mass effect, or intraparenchymal hemorrhage. No LVO noted. 50% L ICA stenosis at origin. MRI w/o contrast showed acute infarction of the left corona radiata. Etiology most likely small vessel disease vs KATHERIN,VAS carotid US pending    Antithrombotics for secondary stroke prevention: Antiplatelets: Aspirin: 81 mg daily  Clopidogrel: 75 mg daily    Statins for secondary stroke prevention and hyperlipidemia, if present:   Statins: Atorvastatin- 80 mg daily    Aggressive risk factor modification: HTN, DM, HLD     Rehab efforts: The patient has been evaluated by a stroke team provider and the therapy needs have been fully considered based off the presenting complaints and exam findings. The following therapy evaluations are needed: PT evaluate and treat, OT evaluate and treat, SLP evaluate and treat, PM&R evaluate for appropriate placement    Diagnostics ordered/pending: Carotid ultrasound to assess vasculature, TTE to assess cardiac function/status     VTE prophylaxis: Heparin 5000 units SQ every 8 hours    BP parameters: Infarct: No intervention, SBP <220        Cytotoxic cerebral edema  Area of cytotoxic cerebral edema identified when reviewing brain imaging in the territory of the L middle cerebral artery. There is no mass effect associated with it. We will continue to monitor the patients clinical exam for any worsening of symptoms which may indicate expansion of the stroke or the area of the edema resulting in the clinical change. The pattern is suggestive of small  vessel vs KATHERIN etiology.        Dysarthria  SLP to evaluate and treat    Essential hypertension  Stroke risk factor  SBP <220  Holding home BP meds  Prn labetalol    Type 2 diabetes mellitus without complication  Stroke risk factor  A1C 4.9, POCT and SSI not indicated  On metformin at home, holding    Tobacco abuse  Stroke risk factor   on cessation    Hyperlipidemia  Stroke risk factor  Continue home atorvastatin 80 mg qd         Admitted to stroke service for acute infarction in left corona radiata. Started on DAPT.  9/9 VAS carotid US ordered due to BL carotid disease on CTA, possible discharge today pending therapy recommendations    STROKE DOCUMENTATION   Acute Stroke Times   Last Known Normal Date: 09/08/19  Last Known Normal Time: 0900  Symptom Onset Date: 09/08/19  Symptom Onset Time: 0900  Stroke Team Called Date: 09/08/19  Stroke Team Called Time: 1301  Stroke Team Arrival Date: 09/08/19  Stroke Team Arrival Time: 1306    NIH Scale:  1a. Level of Consciousness: 0-->Alert, keenly responsive  1b. LOC Questions: 0-->Answers both questions correctly  1c. LOC Commands: 0-->Performs both tasks correctly  2. Best Gaze: 0-->Normal  3. Visual: 0-->No visual loss  4. Facial Palsy: 1-->Minor paralysis (flattened nasolabial fold, asymmetry on smiling)  5a. Motor Arm, Left: 0-->No drift, limb holds 90 (or 45) degrees for full 10 secs  5b. Motor Arm, Right: 0-->No drift, limb holds 90 (or 45) degrees for full 10 secs  6a. Motor Leg, Left: 0-->No drift, leg holds 30 degree position for full 5 secs  6b. Motor Leg, Right: 0-->No drift, leg holds 30 degree position for full 5 secs  7. Limb Ataxia: 0-->Absent  8. Sensory: 0-->Normal, no sensory loss  9. Best Language: 0-->No aphasia, normal  10. Dysarthria: 2-->Severe dysarthria, patients speech is so slurred as to be unintelligible in the absence of or out of proportion to any dysphasia, or is mute/anarthric  11. Extinction and Inattention (formerly Neglect): 0-->No  abnormality  Total (NIH Stroke Scale): 3       Modified Whiteclay    Griffith Coma Scale:    ABCD2 Score:    [id number] Score:   HAS -BLED Score:   ICH Score:   Hunt & Grayson Classification:      Hemorrhagic change of an Ischemic Stroke: Does this patient have an ischemic stroke with hemorrhagic changes? No     Neurologic Chief Complaint: L MCA infarct    Subjective:     Interval History: Patient is seen for follow-up neurological assessment and treatment recommendations: SHIRINDARCION, reports continued speech difficulty but improved when compared to yesterday    HPI, Past Medical, Family, and Social History remains the same as documented in the initial encounter.     Review of Systems   Constitutional: Negative for chills and fever.   Respiratory: Negative for cough.    Cardiovascular: Negative for chest pain.   Gastrointestinal: Negative for nausea and vomiting.   Neurological: Positive for facial asymmetry and speech difficulty. Negative for weakness and numbness.     Scheduled Meds:   aspirin  81 mg Oral Daily    atorvastatin  80 mg Oral Daily    clopidogrel  75 mg Oral Daily    heparin (porcine)  5,000 Units Subcutaneous Q8H    polyethylene glycol  17 g Oral Daily    senna-docusate 8.6-50 mg  1 tablet Oral BID     Continuous Infusions:   sodium chloride 0.9%       PRN Meds:acetaminophen, Dextrose 10% Bolus, Dextrose 10% Bolus, glucagon (human recombinant), glucose, glucose, insulin aspart U-100, labetalol, ondansetron, ondansetron, ramelteon, sodium chloride 0.9%, sodium chloride 0.9%    Objective:     Vital Signs (Most Recent):  Temp: 97 °F (36.1 °C) (09/09/19 0751)  Pulse: (!) 45 (09/09/19 0753)  Resp: 20 (09/09/19 0751)  BP: (!) 172/79 (09/09/19 0751)  SpO2: 98 % (09/09/19 0751)  BP Location: Left arm    Vital Signs Range (Last 24H):  Temp:  [97 °F (36.1 °C)-98.8 °F (37.1 °C)]   Pulse:  [45-68]   Resp:  [14-20]   BP: (135-172)/(62-90)   SpO2:  [95 %-100 %]   BP Location: Left arm    Physical Exam    Constitutional: She is oriented to person, place, and time. She appears well-developed and well-nourished. No distress.   HENT:   Head: Normocephalic and atraumatic.   Cardiovascular: Bradycardia present.   Pulmonary/Chest: Effort normal. No respiratory distress.   Neurological: She is alert and oriented to person, place, and time.   Skin: Skin is warm and dry.   Vitals reviewed.      Neurological Exam:   LOC: alert  Attention Span: Good   Language: No aphasia  Articulation: Dysarthria  Orientation: Person, Place, Time   Visual Fields: Full  EOM (CN III, IV, VI): Full/intact  Facial Movement (CN VII): Lower facial weakness on the Right  Motor: Arm left  Normal 5/5  Leg left  Normal 5/5  Arm right  Normal 5/5  Leg right Normal 5/5  Cebellar: No evidence of appendicular or axial ataxia  Sensation: Intact to light touch, temperature and vibration  Tone: Normal tone throughout    Laboratory:  CMP:   Recent Labs   Lab 09/09/19  0346   CALCIUM 9.7   ALBUMIN 3.7   PROT 7.1      K 3.6   CO2 25      BUN 10   CREATININE 0.8   ALKPHOS 59   ALT 10   AST 14   BILITOT 0.4     CBC:   Recent Labs   Lab 09/09/19  0346   WBC 7.63   RBC 4.25   HGB 13.6   HCT 39.1      MCV 92   MCH 32.0*   MCHC 34.8     Lipid Panel:   Recent Labs   Lab 09/08/19  1330   CHOL 165   LDLCALC 111.6   HDL 42   TRIG 57     Coagulation:   Recent Labs   Lab 09/09/19  0346   INR 1.0   APTT 25.6     Platelet Aggregation Study: No results for input(s): PLTAGG, PLTAGINTERP, PLTAGREGLACO, ADPPLTAGGREG in the last 168 hours.  Hgb A1C:   Recent Labs   Lab 09/09/19  0346   HGBA1C 4.9     TSH:   Recent Labs   Lab 09/08/19  1330   TSH 0.747       Diagnostic Results     Brain Imaging   MRI Brain. Date: 09/08/19  Small focus of diffusion restriction in the left corona radiata, consistent with acute infarction.    Extensive lacunar type infarctions in the basal ganglia, thalami, and in the blayne.    Unchanged appearance of scattered foci of old  microhemorrhages in the supratentorial brain and blayne.    Vessel Imaging   CTA Multiphase. Date: 09/08/19  No acute major vascular distribution infarction, significant mass effect, or intraparenchymal hemorrhage.    Moderate amount of scattered white matter disease, most consistent with chronic microvascular ischemic change.    No arterial occlusion identified.  Diminutive distal left vertebral artery and diminutive P1 PCA segments bilaterally.  Prominent posterior communicating arteries noted, normal variant.    50% stenosis of the left ICA origin.    Cardiac Imaging   Echo pending            Bren Smith PA-C  Comprehensive Stroke Center  Department of Vascular Neurology   [facility]

## 2019-09-09 NOTE — ASSESSMENT & PLAN NOTE
Area of cytotoxic cerebral edema identified when reviewing brain imaging in the territory of the L middle cerebral artery. There is no mass effect associated with it. We will continue to monitor the patients clinical exam for any worsening of symptoms which may indicate expansion of the stroke or the area of the edema resulting in the clinical change. The pattern is suggestive of small vessel vs KATHERIN etiology.

## 2019-09-09 NOTE — NURSING
DC information given to patient; verbalizes understanding. IVs DCed; patient tolerated well. Nurse rolled patient out in WC for DC; belongings with patient.

## 2019-09-09 NOTE — PLAN OF CARE
Problem: Occupational Therapy Goal  Goal: Occupational Therapy Goal  Goals not set 2* no further OT needed.  SARAH Nye  9/9/2019    Outcome: Outcome(s) achieved Date Met: 09/09/19  OT evaluation completed.  SARAH Nye  9/9/2019

## 2019-09-09 NOTE — CONSULTS
Inpatient consult to Physical Medicine Rehab  Consult performed by: Bren Gaytan NP  Consult ordered by: Inga Moser MD  Reason for consult: Assess rehab needs      Reviewed patient history and current admission.  Rehab team following.  Full consult to follow.    SARA Richmond, FNP-C  Physical Medicine & Rehabilitation   09/09/2019  Spectralink: [id number]

## 2019-09-09 NOTE — PLAN OF CARE
Problem: Adult Inpatient Plan of Care  Goal: Plan of Care Review  Outcome: Ongoing (interventions implemented as appropriate)  POC reviewed with patient. All questions and concerns reviewed. VSS throughout shift. Fall/safety precautions implemented and maintained. Blood glucose monitored. Refuses tylenol for pain. Stroke patient education guide reviewed with patient and this book remains at bedside. Stressed the importance of following these guidelines. Patient verbalized understanding. Bed locked in lowest position. Call bell within reach. Will continue to monitor.

## 2019-09-09 NOTE — PT/OT/SLP EVAL
"Occupational Therapy   Evaluation and Discharge Note    Name: Kaila cSott  MRN: [id number]  Admitting Diagnosis:  Aphasia      Recommendations:     Discharge Recommendations: home  Discharge Equipment Recommendations:  none  Barriers to discharge:  None    Assessment:     Kaila Scott is a 63 y.o. female with a medical diagnosis of Aphasia. At this time, patient is functioning at their prior level of function and does not require further acute OT services.     Plan:     During this hospitalization, patient does not require further acute OT services.  Please re-consult if situation changes.    · Plan of Care Reviewed with: patient    Subjective     Patient:  "I was at [employer] and they told me that I was talking with a slur. I felt wobbly on my feet.  It didn't pass so that scared me.  I just had a stroke in December.  In December, I was dragging my left foot and I was wearing diapers.  I don't want that to happen again.  I smoke occasionally and I am trying to stop."  Occupational Profile:  Patient resides in [place] alone in one story home with 4 steps to enter, rail on the left.  PTA patient independent with ADLs including driving.  Currently owns a walker and uses a grab bar in the bathroom.  Patient is right handed.  Unemployed.  Roles/Responsibilities:  Mother, household chores.    Pain/Comfort:  · Pain Rating 1: 0/10  · Pain Rating Post-Intervention 1: 0/10    Patients cultural, spiritual, Buddhism conflicts given the current situation: no    Objective:     Communicated with: Nurse prior to session.  Patient found supine with telemetry, bed alarm upon OT entry to room.    General Precautions: Standard, aspiration, fall   Orthopedic Precautions:N/A   Braces: N/A     Occupational Performance:    Bed Mobility:    · Patient completed Rolling/Turning to Left with  modified independence  · Patient completed Rolling/Turning to Right with modified independence  · Patient completed Scooting/Bridging with modified " independence  · Patient completed Supine to Sit with modified independence  · Patient completed Sit to Supine with modified independence    Functional Mobility/Transfers:  · Patient completed Sit <> Stand Transfer with modified independence  with  no assistive device   · Patient completed Bed <> Chair Transfer using Stand Pivot technique with modified independence with no assistive device    Activities of Daily Living:  · Grooming: modified independence while standing  · Upper Body Dressing: modified independence while standing  · Lower Body Dressing: modified independence while standing  · Toileting: modified independence with use of std commode    Cognitive/Visual Perceptual:  Cognitive/Psychosocial Skills:     -       Oriented to: Person, Place, Time and Situation   -       Follows Commands/attention:Follows two-step commands  -       Communication: dysarthria  -       Safety awareness/insight to disability: intact   -       Mood/Affect/Coping skills/emotional control: Appropriate to situation and Cooperative    Physical Exam:  Postural examination/scapula alignment:    -       Rounded shoulders  Skin integrity: Visible skin intact  Edema:  None noted  Sensation:    -       Intact  Upper Extremity Range of Motion:     -       Right Upper Extremity: WNL  -       Left Upper Extremity: WNL  Upper Extremity Strength:    -       Right Upper Extremity: WNL  -       Left Upper Extremity: WNL    AMPAC 6 Click ADL:  AMPAC Total Score: 24    Treatment & Education:  Patient education provided for stroke warning signs, prevention guidelines and personal risk factors.  Patient verbalizing understanding via teach back method.  Patient education provided on role of OT and need for no further OT upon discharge. Patient alert and oriented x 3; able to follow 4/4 one step commands.  Patient attentive and interactive throughout the session.  Patient able to identify 5/5 body parts.  Able to name 5/5 objects.  Able to sequence 7/7  days of the week and 12/12 months of the year.  Patient's functional status and disposition recommendation discussed with stroke team in daily rounds.  White board updated in patient's room.  OT asked if there were any other questions; patient/ family had no further questions.     The Barthel Index of Activities of Daily Living Score:  Bowels:  2 = continent  Bladder:  2 = continent (for over 7 days).  Groomin = independent face/hair/teeth/shaving (implements provided)  Toilet Use:  2 = independent (on and off, dressing, wiping)  Feeding : 2 = independent (food provided within reach).  Transfer: 3 = independent   Mobility: 3 = independent (but may use any aid, e.g., stick)  Dressin = independent (including buttons, zips, laces, etc.)  Stairs: 2 = independent up and down  Bathin = independent (or in shower)  Score: 20/20 (modified scoring for Barthel index)   lower scores indicating increased disability  Education:    Patient left supine with all lines intact, call button in reach and bed alarm on    GOALS:   Multidisciplinary Problems     Occupational Therapy Goals     Not on file          Multidisciplinary Problems (Resolved)        Problem: Occupational Therapy Goal    Goal Priority Disciplines Outcome Interventions   Occupational Therapy Goal   (Resolved)     OT, PT/OT Outcome(s) achieved    Description:  Goals not set 2* no further OT needed.  SARAH Nye  2019                      History:     Past Medical History:   Diagnosis Date    Arthritis     Back pain with sciatica     Diabetes mellitus     Hypertension     Stroke        History reviewed. No pertinent surgical history.    Time Tracking:     OT Date of Treatment: 19  OT Start Time: 05  OT Stop Time: 06  OT Total Time (min): 21 min    Billable Minutes:Evaluation 12  Therapeutic Activity 9    SARAH Nye  2019

## 2019-09-09 NOTE — CONSULTS
[facility]  Physical Medicine & Rehab  Consult Note    Patient Name: Kaila Scott  MRN: [id number]  Admission Date: 9/8/2019  Hospital Length of Stay: 1 days  Attending Physician: Ousmane Greer MD     Inpatient consult to Physical Medicine & Rehabilitation  Consult performed by: Bren Gaytan NP  Consult requested by:  Ousmane Greer MD    Collaborating Physician: Carola Duenas MD  Reason for Consult:  Assess rehabilitation needs     Consults  Subjective:     Principal Problem: Cerebral infarction due to thrombosis of left middle cerebral artery    HPI: Kaila Scott is a 63-year-old female with PMHx of prior CVAs (most recent stroke was in 12/2018), HTN, and HLD. Patient presented to [facility] on 9/8/19 for slurred speech. CTA done and did not show any acute major vascular distribution infarction, significant mass effect, or intraparenchymal hemorrhage. No LVO noted. 50% L ICA stenosis at origin. MRI showed acute infarction of the left corona radiata. Etiology most likely small vessel disease.     Functional History: Patient lives alone in a 1 story home with 4 steps to enter.  Prior to admission, (I). DME: none.     Hospital Course: PT and OT evals pending     Past Medical History:   Diagnosis Date    Arthritis     Back pain with sciatica     Diabetes mellitus     Hypertension     Stroke      History reviewed. No pertinent surgical history.  Review of patient's allergies indicates:  No Known Allergies    Scheduled Medications:    aspirin  81 mg Oral Daily    atorvastatin  80 mg Oral Daily    clopidogrel  75 mg Oral Daily    heparin (porcine)  5,000 Units Subcutaneous Q8H    polyethylene glycol  17 g Oral Daily    senna-docusate 8.6-50 mg  1 tablet Oral BID       PRN Medications: acetaminophen, labetalol, ondansetron, ondansetron, ramelteon, sodium chloride 0.9%, sodium chloride 0.9%    Family History     Problem Relation (Age of Onset)    Cancer Brother        Tobacco Use     Smoking status: Current Some Day Smoker     Packs/day: 0.50     Years: 40.00     Pack years: 20.00     Types: Cigarettes    Smokeless tobacco: Never Used   Substance and Sexual Activity    Alcohol use: No     Comment: Recently stopped    Drug use: No    Sexual activity: Not Currently     Review of Systems   Constitutional: Positive for activity change. Negative for fatigue and fever.   HENT: Negative for sore throat and trouble swallowing.    Eyes: Negative for visual disturbance.   Respiratory: Negative for cough and shortness of breath.    Cardiovascular: Negative for chest pain and leg swelling.   Gastrointestinal: Negative for abdominal distention and abdominal pain.   Genitourinary: Negative for difficulty urinating.   Musculoskeletal: Negative for back pain and gait problem.   Skin: Negative for color change.   Neurological: Positive for speech difficulty. Negative for light-headedness and headaches.   Psychiatric/Behavioral: Negative for agitation, behavioral problems and confusion.     Objective:     Vital Signs (Most Recent):  Temp: 98.1 °F (36.7 °C) (09/09/19 1121)  Pulse: 60 (09/09/19 1121)  Resp: 20 (09/09/19 0751)  BP: (!) 144/67 (09/09/19 1121)  SpO2: 98 % (09/09/19 1121)    Vital Signs (24h Range):  Temp:  [97 °F (36.1 °C)-98.8 °F (37.1 °C)] 98.1 °F (36.7 °C)  Pulse:  [45-68] 60  Resp:  [14-20] 20  SpO2:  [95 %-100 %] 98 %  BP: (135-172)/(62-90) 144/67     Body mass index is 27.36 kg/m².    Physical Exam   Constitutional: She is oriented to person, place, and time. She appears well-developed and well-nourished.   HENT:   Head: Normocephalic and atraumatic.   Eyes: Pupils are equal, round, and reactive to light. EOM are normal.   Neck: Normal range of motion. Neck supple.   Pulmonary/Chest: Effort normal. No respiratory distress.   Abdominal: Normal appearance.   Musculoskeletal: Normal range of motion.   Neurological: She is alert and oriented to person, place, and time. No sensory deficit. She  exhibits normal muscle tone.   - Speech deficit noted   Skin: Skin is warm and dry.   Psychiatric: She has a normal mood and affect. Her behavior is normal. Thought content normal.   Nursing note and vitals reviewed.    Diagnostic Results:   Labs: Reviewed  ECG: Reviewed  MRI: Reviewed    Assessment/Plan:     * Cerebral infarction due to thrombosis of left middle cerebral artery  - MRI showed acute infarction of the left corona radiata.   - Etiology most likely small vessel disease.     See hospital course for functional, cognitive/speech/language, and nutrition/swallow status.      Recommendations  -  Encourage mobility, OOB in chair at least 3 hours per day, and early ambulation as appropriate   -  PT/OT evaluate and treat  -  SLP speech and cognitive evaluate and treat  -  Monitor sleep disturbances and establish consistent sleep-wake cycle  -  Monitor for bowel and bladder dysfunction  -  Monitor for shoulder pain and subluxation  -  Monitor for spasticity  -  Monitor for and prevent skin breakdown and pressure ulcers  · Early mobility, repositioning/weight shifting every 20-30 minutes when sitting, turn patient every 2 hours, proper mattress/overlay and chair cushioning, pressure relief/heel protector boots  -  DVT prophylaxis  -  Reviewed discharge options (IP rehab, SNF, HH therapy, and OP therapy)    Dysarthria  - SLP eval and treat      Therapy evals pending. Will follow progress and discuss with rehab team for post acute care/rehab recommendation.    Thank you for your consult.     Bren Gaytan NP  Department of Physical Medicine & Rehab  [facility]

## 2019-09-09 NOTE — SUBJECTIVE & OBJECTIVE
Past Medical History:   Diagnosis Date    Arthritis     Back pain with sciatica     Diabetes mellitus     Hypertension     Stroke      History reviewed. No pertinent surgical history.  Review of patient's allergies indicates:  No Known Allergies    Scheduled Medications:    aspirin  81 mg Oral Daily    atorvastatin  80 mg Oral Daily    clopidogrel  75 mg Oral Daily    heparin (porcine)  5,000 Units Subcutaneous Q8H    polyethylene glycol  17 g Oral Daily    senna-docusate 8.6-50 mg  1 tablet Oral BID       PRN Medications: acetaminophen, labetalol, ondansetron, ondansetron, ramelteon, sodium chloride 0.9%, sodium chloride 0.9%    Family History     Problem Relation (Age of Onset)    Cancer Brother        Tobacco Use    Smoking status: Current Some Day Smoker     Packs/day: 0.50     Years: 40.00     Pack years: 20.00     Types: Cigarettes    Smokeless tobacco: Never Used   Substance and Sexual Activity    Alcohol use: No     Comment: Recently stopped    Drug use: No    Sexual activity: Not Currently     Review of Systems   Constitutional: Positive for activity change. Negative for fatigue and fever.   HENT: Negative for sore throat and trouble swallowing.    Eyes: Negative for visual disturbance.   Respiratory: Negative for cough and shortness of breath.    Cardiovascular: Negative for chest pain and leg swelling.   Gastrointestinal: Negative for abdominal distention and abdominal pain.   Genitourinary: Negative for difficulty urinating.   Musculoskeletal: Negative for back pain and gait problem.   Skin: Negative for color change.   Neurological: Positive for speech difficulty. Negative for light-headedness and headaches.   Psychiatric/Behavioral: Negative for agitation, behavioral problems and confusion.     Objective:     Vital Signs (Most Recent):  Temp: 98.1 °F (36.7 °C) (09/09/19 1121)  Pulse: 60 (09/09/19 1121)  Resp: 20 (09/09/19 0751)  BP: (!) 144/67 (09/09/19 1121)  SpO2: 98 % (09/09/19  1121)    Vital Signs (24h Range):  Temp:  [97 °F (36.1 °C)-98.8 °F (37.1 °C)] 98.1 °F (36.7 °C)  Pulse:  [45-68] 60  Resp:  [14-20] 20  SpO2:  [95 %-100 %] 98 %  BP: (135-172)/(62-90) 144/67     Body mass index is 27.36 kg/m².    Physical Exam   Constitutional: She is oriented to person, place, and time. She appears well-developed and well-nourished.   HENT:   Head: Normocephalic and atraumatic.   Eyes: Pupils are equal, round, and reactive to light. EOM are normal.   Neck: Normal range of motion. Neck supple.   Pulmonary/Chest: Effort normal. No respiratory distress.   Abdominal: Normal appearance.   Musculoskeletal: Normal range of motion.   Neurological: She is alert and oriented to person, place, and time. No sensory deficit. She exhibits normal muscle tone.   - Speech deficit noted   Skin: Skin is warm and dry.   Psychiatric: She has a normal mood and affect. Her behavior is normal. Thought content normal.   Nursing note and vitals reviewed.    NEUROLOGICAL EXAMINATION:     MENTAL STATUS   Oriented to person, place, and time.     CRANIAL NERVES     CN III, IV, VI   Pupils are equal, round, and reactive to light.  Extraocular motions are normal.       Diagnostic Results:   Labs: Reviewed  ECG: Reviewed  MRI: Reviewed

## 2019-09-09 NOTE — ASSESSMENT & PLAN NOTE
- MRI showed acute infarction of the left corona radiata.   - Etiology most likely small vessel disease.     See hospital course for functional, cognitive/speech/language, and nutrition/swallow status.      Recommendations  -  Encourage mobility, OOB in chair at least 3 hours per day, and early ambulation as appropriate   -  PT/OT evaluate and treat  -  SLP speech and cognitive evaluate and treat  -  Monitor sleep disturbances and establish consistent sleep-wake cycle  -  Monitor for bowel and bladder dysfunction  -  Monitor for shoulder pain and subluxation  -  Monitor for spasticity  -  Monitor for and prevent skin breakdown and pressure ulcers  · Early mobility, repositioning/weight shifting every 20-30 minutes when sitting, turn patient every 2 hours, proper mattress/overlay and chair cushioning, pressure relief/heel protector boots  -  DVT prophylaxis  -  Reviewed discharge options (IP rehab, SNF, HH therapy, and OP therapy)

## 2019-09-09 NOTE — DISCHARGE SUMMARY
"[facility]  Vascular Neurology  Comprehensive Stroke Center  Discharge Summary     Summary:     Admit Date: 9/8/2019  1:05 PM    Discharge Date and Time:  09/09/2019 4:24 PM    Attending Physician: Ousmane Greer MD     Discharge Provider: Bren Smith PA-C    History of Present Illness: 64 y/o with a medical history of prior CVAs in the past (most recent in 2018 showing lacunar infarcts in the right corona radiata, b/l BG and right thalamus), type 2 diabetes and HTN brought into the ED today by EMS with complaints of aphasia. Last known normal was around 9 AM today when she went to [place]. As per patient, her daughter forced her to call 911 as she was slurring her words. Upon arrival to the ED, NIHSS 2 (aphasia and dysarthia). No drift in the arms or legs, 5/5 strength upper and lower extremities. CTA multiphase done in the ED which so far does not show any acute abnormalities. Awaiting MRI. Patient potassium is 3.2.     Patient previously admitted to [facility] for further stroke workup after being admitted for unsteady gait, dizziness and vomiting. On admit to [facility] patient had remote lacunar infarcts in the right corona radiata, bilateral basal ganglia and right thalamus (that appear to be chronic appearing). MRI brain showed acute infarct left parietal lobe with tiny focus of "susceptibility changes" right caudate nucleus and lentiform nucleus and left thalamus.  MRA Head/Neck showed irregularity along the the P2 and P3 segments of the posterior cerebral arteries bilaterally, likely from stenotic disease. Transthoracic echo with EF 65% and mild left ventricular diastolic dysfunction. Patient was discharged to IP rehab with follow up with neurology (patient was suppose to see Dr Menon).     Home medications include liptor 80 mg, coreg 25 mg BID, lisinopril 40 mg daily, claritine 10 mg, tramadol 50 mg daily, trazadone 50 mg daily, and metformin 500 mg daily. Patient mentioned " "that she took 10 mg oxycodone for her back pain. Unclear when she took it but mentions "might have been last night". She also takes other pain medications for her back pain as well.     Hospital Course (synopsis of major diagnoses, care, treatment, and services provided during the course of the hospital stay): Kaila Scott is a 63 y.o. female with PMHx of stroke(most recent stroke in 12/2018), HTN and HLD who was admitted for dysarthria and facial droop 2/2 L MCA stroke. MRI confirmed infarct in L corona radiata. CTA without LVO and ~50% L ICA stenosis at origin. VAS carotid US with 1-39% stenosis bilaterally. Etiology likely small vessel disease.    Patient evaluated by therapy who recommended outpatient SLP. Patient requesting home health, will follow up with case management about arranging home health. She was also given outpatient SLP orders if needed. For secondary stroke prevention, she will continue DAPT for 30 days then asa 81 mg monotherapy. She will also continue atorvastatin 80 mg. She was counseled on smoking cessation prior to discharge. Patient will resume all other home medications at discharge. Patient will be discharged home with outpatient SLP vs home health SLP. She will follow up with her PCP and stroke clinic.          Stroke Etiology: Evident Small Artery Occlusion (ACE)    STROKE DOCUMENTATION   Acute Stroke Times   Last Known Normal Date: 09/08/19  Last Known Normal Time: 0900  Symptom Onset Date: 09/08/19  Symptom Onset Time: 0900  Stroke Team Called Date: 09/08/19  Stroke Team Called Time: 1301  Stroke Team Arrival Date: 09/08/19  Stroke Team Arrival Time: 1306     NIH Scale:  Interval: 7 days or at discharge (whichever comes first)  1a. Level of Consciousness: 0-->Alert, keenly responsive  1b. LOC Questions: 0-->Answers both questions correctly  1c. LOC Commands: 0-->Performs both tasks correctly  2. Best Gaze: 0-->Normal  3. Visual: 0-->No visual loss  4. Facial Palsy: 1-->Minor " paralysis (flattened nasolabial fold, asymmetry on smiling)  5a. Motor Arm, Left: 0-->No drift, limb holds 90 (or 45) degrees for full 10 secs  5b. Motor Arm, Right: 0-->No drift, limb holds 90 (or 45) degrees for full 10 secs  6a. Motor Leg, Left: 0-->No drift, leg holds 30 degree position for full 5 secs  6b. Motor Leg, Right: 0-->No drift, leg holds 30 degree position for full 5 secs  7. Limb Ataxia: 0-->Absent  8. Sensory: 0-->Normal, no sensory loss  9. Best Language: 0-->No aphasia, normal  10. Dysarthria: 2-->Severe dysarthria, patients speech is so slurred as to be unintelligible in the absence of or out of proportion to any dysphasia, or is mute/anarthric  11. Extinction and Inattention (formerly Neglect): 0-->No abnormality  Total (NIH Stroke Scale): 3        Modified Dmitry Score: 1  [place] Coma Scale:    ABCD2 Score:    [id number] Score:   HAS -BLED Score:   ICH Score:   Hunt & Grayson Classification:       Assessment/Plan:     Diagnostic Results:      BBrain Imaging   MRI Brain. Date: 09/08/19  Small focus of diffusion restriction in the left corona radiata, consistent with acute infarction.    Extensive lacunar type infarctions in the basal ganglia, thalami, and in the blayne.    Unchanged appearance of scattered foci of old microhemorrhages in the supratentorial brain and blayne.     Vessel Imaging   CTA Multiphase. Date: 09/08/19  No acute major vascular distribution infarction, significant mass effect, or intraparenchymal hemorrhage.    Moderate amount of scattered white matter disease, most consistent with chronic microvascular ischemic change.    No arterial occlusion identified.  Diminutive distal left vertebral artery and diminutive P1 PCA segments bilaterally.  Prominent posterior communicating arteries noted, normal variant.    50% stenosis of the left ICA origin.     Cardiac Imaging   Echo. Date: 09/09/19  · Concentric left ventricular remodeling.  · Normal left ventricular systolic function. The  estimated ejection fraction is 55%  · Normal LV diastolic function.  · Normal right ventricular systolic function.  · Normal central venous pressure (3 mm Hg).          Interventions: None    Complications: None    Disposition: Home-Health Care [facility]    Final Active Diagnoses:    Diagnosis Date Noted POA    PRINCIPAL PROBLEM:  Cerebral infarction due to thrombosis of left middle cerebral artery [I63.312] 09/08/2019 Yes    Cytotoxic cerebral edema [G93.6] 09/09/2019 Yes    Bilateral carotid artery stenosis [I65.23]  Unknown    Dysarthria [R47.1] 09/08/2019 Yes    Essential hypertension [I10]  Yes    Type 2 diabetes mellitus without complication [E11.9]  Yes    Tobacco abuse [Z72.0] 12/19/2014 Yes    Hyperlipidemia [E78.5] 12/18/2014 Yes      Problems Resolved During this Admission:    Diagnosis Date Noted Date Resolved POA    Hypokalemia [E87.6] 09/08/2019 09/09/2019 Yes     No new Assessment & Plan notes have been filed under this hospital service since the last note was generated.  Service: Vascular Neurology      Recommendations:     Post-discharge complication risks: None    Stroke Education given to: patient    Follow-up in Stroke Clinic in 4-6 weeks.     Discharge Plan:  Antithrombotics: Aspirin 81mg, Clopidogrel 75mg  Statin: Atorvastatin 80mg  Smoking Cessation  Aggresive risk factor modification:  Hypertension  Smoking  Diabetes  High Cholesterol    Follow Up:  Follow-up Information     [facility].    Specialties:  Behavioral Health, Psychiatry  Why:  Outpatient Services  Contact information:  [address]  [facility] [address]  [phone]             [facility] VASCULAR NEUROLOGY In 4 weeks.    Specialty:  Vascular Neurology  Why:  Someone will contact you to schedule your stroke follow up appointment. Please contact phone number listed if you do not receive a phone call within 1 week  Contact information:  [address]  [facility]  [address]  [phone]                 Patient Instructions:      Diet diabetic     Call 911 for any of the following:   Order Comments: Call 911  right away if any of the following warning signs come on suddenly, even if the symptoms only last for a few minutes. With stroke, timing is very important.   - Warning Signs of Stroke:  - Weakness: You may feel a sudden weakness, tingling or loss of feeling on one side of your face or body.  - Vision Problems: You may have sudden double vision or trouble seeing in one or both eyes.  - Speech Problems: You may have sudden trouble talking, slured speech, or problems understanding others.  - Headache: You may have sudden, severe headache.  - Movement Problems: You may experience dizziness, a feeling of spinning, a loss of balance, a feeling of falling or blackouts.     Activity as tolerated       Medications:  Reconciled Home Medications:      Medication List      START taking these medications    aspirin 81 MG EC tablet  Commonly known as:  ECOTRIN  Take 1 tablet (81 mg total) by mouth once daily.  Start taking on:  9/10/2019     clopidogrel 75 mg tablet  Commonly known as:  PLAVIX  Take 1 tablet (75 mg total) by mouth once daily.  Start taking on:  9/10/2019     oxybutynin 5 MG Tr24  Commonly known as:  DITROPAN-XL  Take 1 tablet (5 mg total) by mouth once daily.  Start taking on:  9/10/2019        CHANGE how you take these medications    carvedilol 25 MG tablet  Commonly known as:  COREG  Take 25 mg by mouth 2 (two) times daily with meals.  What changed:  Another medication with the same name was removed. Continue taking this medication, and follow the directions you see here.     metFORMIN 500 MG tablet  Commonly known as:  GLUCOPHAGE  Take 1 tablet (500 mg total) by mouth 2 (two) times daily with meals.  What changed:  when to take this        CONTINUE taking these medications    atorvastatin 80 MG tablet  Commonly known as:  LIPITOR  Take 1 tablet (80 mg total) by mouth  once daily.     lisinopril 20 MG tablet  Commonly known as:  PRINIVIL,ZESTRIL  Take 40 mg by mouth once daily.     loratadine 10 mg tablet  Commonly known as:  CLARITIN  Take 10 mg by mouth daily as needed for Allergies.     methylPREDNISolone 4 mg tablet  Commonly known as:  MEDROL DOSEPACK  use as directed     traMADol 50 mg tablet  Commonly known as:  ULTRAM  Take 1-2 tablets ( mg total) by mouth every 6 (six) hours as needed for Pain.     traZODone 50 MG tablet  Commonly known as:  DESYREL  Take 50 mg by mouth every evening.                  Bren Smith PA-C  Comprehensive Stroke Center  Department of Vascular Neurology   [facility]

## 2019-09-09 NOTE — ASSESSMENT & PLAN NOTE
66 y/o female with a medical history of prior CVAs (most recent stroke was in 12/2018) in the past, HTN and HLD was brought in by the EMS for slurred speech. Upon arrival CTA was done which did not show any acute major vascular distribution infarction, significant mass effect, or intraparenchymal hemorrhage. No LVO noted. 50% L ICA stenosis at origin. MRI w/o contrast showed acute infarction of the left corona radiata. Etiology most likely small vessel disease vs KATHERIN,VAS carotid US pending    Antithrombotics for secondary stroke prevention: Antiplatelets: Aspirin: 81 mg daily  Clopidogrel: 75 mg daily    Statins for secondary stroke prevention and hyperlipidemia, if present:   Statins: Atorvastatin- 80 mg daily    Aggressive risk factor modification: HTN, DM, HLD     Rehab efforts: The patient has been evaluated by a stroke team provider and the therapy needs have been fully considered based off the presenting complaints and exam findings. The following therapy evaluations are needed: PT evaluate and treat, OT evaluate and treat, SLP evaluate and treat, PM&R evaluate for appropriate placement    Diagnostics ordered/pending: Carotid ultrasound to assess vasculature, TTE to assess cardiac function/status     VTE prophylaxis: Heparin 5000 units SQ every 8 hours    BP parameters: Infarct: No intervention, SBP <220

## 2019-09-09 NOTE — PLAN OF CARE
Problem: Physical Therapy Goal  Goal: Physical Therapy Goal  Outcome: Outcome(s) achieved Date Met: 09/09/19  PT evaluation completed - see note for details. D/C from acute PT     JEN Mario  9/9/2019

## 2019-09-09 NOTE — PLAN OF CARE
Problem: SLP Goal  Goal: SLP Goal  Speech Language Pathology Goals  Goals expected to be met by 9/16  1. Pt will tolerate regular diet with thin liquids with adequate oral clearance and without overt s/s aspiration.   2. Pt will utilize simple speech strategies at phrase level with 80% intelligibility given min cues.  3. Pt will complete further assessment of higher level cognition, reading, writing and visual spatial skills to determine need for tx.       Outcome: Ongoing (interventions implemented as appropriate)  Evaluation completed.  Rec regular diet with thin liquids with strict aspiration precautions.  Pt requesting home health SLP services upon discharge. MARYANNE Dewey, CCC/SLP  9/9/2019

## 2019-09-10 NOTE — PLAN OF CARE
Pt wanted HH. Referral made to [facility].  SW in contact with CM and Medical staff. Will continue to follow and offer support as needed.     Jono Granados, LASHA  [facility]   Ext. [phone]

## 2019-09-11 ENCOUNTER — PATIENT OUTREACH (OUTPATIENT)
Dept: ADMINISTRATIVE | Facility: CLINIC | Age: 64
End: 2019-09-11

## 2019-09-11 NOTE — PATIENT INSTRUCTIONS
Discharge Instructions for Stroke  You have been diagnosed with a stroke, or with a TIA (transient ischemic attack). Or you have been identified as having a high risk for stroke. During a stroke, blood stops flowing to part of your brain. This can damage areas in the brain that control other parts of the body. Symptoms after a stroke depend on which part of the brain has been affected.  Stroke risk factors  Once youve had a stroke, youre at greater risk for another one. Listed below are some other factors that can increase your risk for a stroke:  · High blood pressure  · High cholesterol  · Cigarette or cigar smoking  · Diabetes  · Carotid or other artery disease  · Atrial fibrillation, atrial flutter, or other heart disease  · Not being physically active  · Obesity  · Certain blood disorders (such as sickle cell anemia)  · Excessive alcohol use  · Abuse of street drugs  · Race  · Gender  · Family history of stroke  · Diet high in salty, fried, or greasy foods  Changes in daily living  Doing your regular tasks may be difficult after youve had a stroke, but you can learn new ways to manage your daily activities. In fact, doing daily activities may help you to regain muscle strength and bring back function to affected limbs. Be patient, give yourself time to adjust, and appreciate the progress you make.  Daily activities  You may be at risk of falling. Make changes to your home to help you walk more easily. A therapist will decide if you need an assistive device to walk safely.  You may need to see an occupational therapist or physical therapist to learn new ways of doing things. For example, you may need to make adjustments when bathing or dressing:  Tips for showering or bathing  · Test the water temperature with a hand or foot that was not affected by the stroke.  · Use grab bars, a shower seat, a hand-held showerhead, and a long-handled brush.  Tips for getting dressed  · Dress while sitting, starting with  the affected side or limb.  · Wear shirts that pull easily over your head. Wear pants or skirts with elastic waistbands.  · Use zippers with loops attached to the pull tabs.  Lifestyle changes  · Take your medicines exactly as directed. Dont skip doses.  · Begin an exercise program. Ask your provider how to get started. Also ask how much activity you should try to get on a daily or weekly basis. You can benefit from simple activities such as walking or gardening.  · Limit alcohol intake. Men should have no more than 2 alcoholic drinks a day. Women should limit themselves to 1 alcoholic drink per day.  · Know your cholesterol level. Follow your providers recommendations about how to keep cholesterol under control.  · If you are a smoker, quit now. Join a stop-smoking program to improve your chances of success. Ask your provider about medicines or other methods to help you quit.  · Learn stress management techniques to help you deal with stress in your home and work life.  Diet  Your healthcare provider will give you information on dietary changes that you may need to make, based on your situation. Your provider may recommend that you see a registered dietitian for help with diet changes. Changes may include:  · Reducing fat and cholesterol intake  · Reducing salt (sodium) intake, especially if you have high blood pressure  · Eating more fresh vegetables and fruits  · Eating more lean proteins, such as fish, poultry, and beans and peas (legumes)  · Eating less red meat and processed meats  · Using low-fat dairy products  · Limiting vegetable oils and nut oils  · Limiting sweets and processed foods such as chips, cookies, and baked goods  Follow-up care  · Keep your medical appointments. Close follow-up is important to stroke rehabilitation and recovery.  · Some medicines require blood tests to check for progress or problems. Keep follow-up appointments for any blood tests ordered by your providers.  When to call  911  Call 911 right away if you have any of the following symptoms of stroke:  · Weakness, tingling, or loss of feeling on one side of your face or body  · Sudden double vision or trouble seeing in one or both eyes  · Sudden trouble talking or slurred speech  · Trouble understanding others  · Sudden, severe headache  · Dizziness, loss of balance, or a sense of falling  · Blackouts or seizures      F.A.S.T. is an easy way to remember the signs of stroke. When you see these signs, you know that you need to call 911 fast.  F.A.S.T. stands for:  · F is for face drooping. One side of the face is drooping or numb. When the person smiles, the smile is uneven.  · A is for arm weakness. One arm is weak or numb. When the person lifts both arms at the same time, one arm may drift downward.  · S is for speech difficulty. You may notice slurred speech or trouble speaking. The person can't repeat a simple sentence correctly when asked.  · T is for time to call 911. If someone shows any of these symptoms, even if they go away, call 911 immediately. Make note of the time the symptoms first appeared.  Date Last Reviewed: 8/26/2015 © 2000-2019 [employer]. [address] [place] [address], [place] [address]. All rights reserved. This information is not intended as a substitute for professional medical care. Always follow your healthcare professional's instructions.

## 2019-09-11 NOTE — PROGRESS NOTES
Pt states that she did not receive Oxybutynin 5 mg. Spoke with [facility] pharmacy on file, states that they did not receive order.Message sent to d/c provider, ANDREW Castañeda. Patient also states that she is forgetful and confused. Spoke with [facility], , Charlene. Advised that patient had not received medication and asked to f/u. Verbalized understanding, stating that a full med rec will be performed when patient is admitted tomorrow.

## 2019-09-11 NOTE — PLAN OF CARE
09/11/19 0847   Final Note   Assessment Type Final Discharge Note   Anticipated Discharge Disposition Home-Health   Right Care Referral Info   Post Acute Recommendation Home-care   Facility Name [facility]

## 2019-09-12 ENCOUNTER — TELEPHONE (OUTPATIENT)
Dept: NEUROLOGY | Facility: CLINIC | Age: 64
End: 2019-09-12

## 2019-09-12 PROCEDURE — G0180 MD CERTIFICATION HHA PATIENT: HCPCS | Mod: ,,, | Performed by: PSYCHIATRY & NEUROLOGY

## 2019-09-12 PROCEDURE — G0180 PR HOME HEALTH MD CERTIFICATION: ICD-10-PCS | Mod: ,,, | Performed by: PSYCHIATRY & NEUROLOGY

## 2019-09-12 NOTE — TELEPHONE ENCOUNTER
----- Message from Nolan Bunn sent at 9/12/2019 10:31 AM CDT -----  Contact: Nadir trejo/ [facility] @ [phone]  Nadir is calling to add home health aid,OT/PT, and

## 2019-09-12 NOTE — TELEPHONE ENCOUNTER
Verbal given for PT/OT, home aid and SW referrals. The POC will be faxed over or dropped over when she has submitted the necessary paperwork. No Epic orders placed during this telephone encounter.

## 2019-09-12 NOTE — TELEPHONE ENCOUNTER
----- Message from Nolan Bunn sent at 9/12/2019 10:31 AM CDT -----  Contact: Nadir trejo/ [facility] @ [phone]  Nadir is calling to add home health aid,OT/PT, and

## 2019-09-13 ENCOUNTER — TELEPHONE (OUTPATIENT)
Dept: NEUROLOGY | Facility: HOSPITAL | Age: 64
End: 2019-09-13

## 2019-09-13 DIAGNOSIS — I63.312 CEREBRAL INFARCTION DUE TO THROMBOSIS OF LEFT MIDDLE CEREBRAL ARTERY: Primary | ICD-10-CM

## 2019-09-13 NOTE — TELEPHONE ENCOUNTER
Spoke with patient. Pt stated she received the wrong AVS and would like to have the correct one mailed to her. I offered to go over the AVS with while on phone but she stated she will read the one mailed to her. Follow up appointment discussed.

## 2019-09-17 ENCOUNTER — TELEPHONE (OUTPATIENT)
Dept: NEUROLOGY | Facility: CLINIC | Age: 64
End: 2019-09-17

## 2019-09-17 DIAGNOSIS — R26.89 ABNORMALITY OF GAIT DUE TO IMPAIRMENT OF BALANCE: Primary | ICD-10-CM

## 2019-09-17 NOTE — TELEPHONE ENCOUNTER
----- Message from Akbar Wagoner sent at 9/17/2019 12:47 PM CDT -----  Contact: Krystyna ([facility]) [phone]  Krystyna called to request an order for small base quad cane. Please send the order to [facility]. The fax number is [phone].

## 2019-09-26 ENCOUNTER — HOME CARE VISIT (OUTPATIENT)
Dept: NEUROLOGY | Facility: HOSPITAL | Age: 64
End: 2019-09-26

## 2019-10-24 VITALS
RESPIRATION RATE: 20 BRPM | OXYGEN SATURATION: 96 % | DIASTOLIC BLOOD PRESSURE: 70 MMHG | SYSTOLIC BLOOD PRESSURE: 140 MMHG | HEART RATE: 78 BPM

## 2019-10-24 NOTE — PROGRESS NOTES
AAOx3 but forgetful.  Lives alone but daughter in town visiting to assist w/ recovery.  NIHSS-2; continues to smoke; compliant w/ meds; no consistent form of exercise noted but does attend outpatient ST; refrains from adding salt, but does have salty and fast food occasionally.  Extremely anxious almost appearing angry.   Education provided on goal of stroke mobile, s/s of stroke, diet, exercise, medications.  Understanding verbalized.  Encouraged to utilize stroke team for any concern.

## 2019-10-28 ENCOUNTER — TELEPHONE (OUTPATIENT)
Dept: HOME HEALTH SERVICES | Facility: HOSPITAL | Age: 64
End: 2019-10-28

## 2019-10-29 ENCOUNTER — EXTERNAL HOME HEALTH (OUTPATIENT)
Dept: HOME HEALTH SERVICES | Facility: HOSPITAL | Age: 64
End: 2019-10-29
Payer: MEDICARE

## 2019-12-02 ENCOUNTER — HOSPITAL ENCOUNTER (EMERGENCY)
Facility: OTHER | Age: 64
Discharge: HOME | End: 2019-12-02
Attending: EMERGENCY MEDICINE
Payer: MEDICARE

## 2019-12-02 VITALS
BODY MASS INDEX: 26.36 KG/M2 | TEMPERATURE: 98 F | DIASTOLIC BLOOD PRESSURE: 74 MMHG | HEART RATE: 62 BPM | HEIGHT: 69 IN | WEIGHT: 178 LBS | OXYGEN SATURATION: 97 % | RESPIRATION RATE: 17 BRPM | SYSTOLIC BLOOD PRESSURE: 154 MMHG

## 2019-12-02 DIAGNOSIS — M54.42 ACUTE LEFT-SIDED LOW BACK PAIN WITH LEFT-SIDED SCIATICA: Primary | ICD-10-CM

## 2019-12-02 PROCEDURE — 96372 THER/PROPH/DIAG INJ SC/IM: CPT

## 2019-12-02 PROCEDURE — 99284 EMERGENCY DEPT VISIT MOD MDM: CPT | Mod: 25

## 2019-12-02 PROCEDURE — 63600175 PHARM REV CODE 636 W HCPCS: Performed by: PHYSICIAN ASSISTANT

## 2019-12-02 PROCEDURE — 25000003 PHARM REV CODE 250: Performed by: PHYSICIAN ASSISTANT

## 2019-12-02 RX ORDER — LIDOCAINE 50 MG/G
1 PATCH TOPICAL DAILY
Qty: 15 PATCH | Refills: 0 | Status: SHIPPED | OUTPATIENT
Start: 2019-12-02 | End: 2023-01-21

## 2019-12-02 RX ORDER — NAPROXEN 500 MG/1
500 TABLET ORAL 2 TIMES DAILY WITH MEALS
Qty: 14 TABLET | Refills: 0 | Status: SHIPPED | OUTPATIENT
Start: 2019-12-02 | End: 2019-12-09

## 2019-12-02 RX ORDER — ORPHENADRINE CITRATE 30 MG/ML
60 INJECTION INTRAMUSCULAR; INTRAVENOUS
Status: COMPLETED | OUTPATIENT
Start: 2019-12-02 | End: 2019-12-02

## 2019-12-02 RX ORDER — TIZANIDINE 2 MG/1
4 TABLET ORAL EVERY 8 HOURS PRN
Qty: 12 TABLET | Refills: 0 | Status: SHIPPED | OUTPATIENT
Start: 2019-12-02

## 2019-12-02 RX ORDER — LIDOCAINE 50 MG/G
1 PATCH TOPICAL
Status: DISCONTINUED | OUTPATIENT
Start: 2019-12-02 | End: 2019-12-02 | Stop reason: HOSPADM

## 2019-12-02 RX ORDER — KETOROLAC TROMETHAMINE 30 MG/ML
30 INJECTION, SOLUTION INTRAMUSCULAR; INTRAVENOUS
Status: COMPLETED | OUTPATIENT
Start: 2019-12-02 | End: 2019-12-02

## 2019-12-02 RX ADMIN — KETOROLAC TROMETHAMINE 30 MG: 30 INJECTION, SOLUTION INTRAMUSCULAR; INTRAVENOUS at 02:12

## 2019-12-02 RX ADMIN — ORPHENADRINE CITRATE 60 MG: 30 INJECTION INTRAMUSCULAR; INTRAVENOUS at 02:12

## 2019-12-02 RX ADMIN — LIDOCAINE 1 PATCH: 50 PATCH TOPICAL at 02:12

## 2019-12-02 NOTE — ED PROVIDER NOTES
"Encounter Date: 12/2/2019       History     Chief Complaint   Patient presents with    Back Pain     + bilateral lower back pains x three days " I lifted up a whole bunch of stuff on Black Friday shopping". Denies recent injury or falls. Denies numbness/tingling. Pt reports taking OTC Corrine, Neurontin and Tylenol this AM w/ no relief     Patient is a 64-year-old female with arthritis, history of lumbar back pain with sciatica, diabetes, hypertension, and history of CVA who presents to the emergency department with left lower back pain.  Patient reports onset of symptoms 3 days ago.  She reports heavy lifting when shopping for a Black Friday.  She states the pain radiates into her left buttocks  And into the superior portion of her left thigh.  She denies leg weakness or numbness.  States the pain is worse for prolonged sitting or standing.  She denies saddle anesthesia.  She denies bowel or bladder incontinence.    The history is provided by the patient.     Review of patient's allergies indicates:  No Known Allergies  Past Medical History:   Diagnosis Date    Arthritis     Back pain with sciatica     Diabetes mellitus     Hypertension     Stroke      No past surgical history on file.  Family History   Problem Relation Age of Onset    Cancer Brother      Social History     Tobacco Use    Smoking status: Current Some Day Smoker     Packs/day: 0.50     Years: 40.00     Pack years: 20.00     Types: Cigarettes    Smokeless tobacco: Never Used   Substance Use Topics    Alcohol use: No     Comment: Recently stopped    Drug use: No     Review of Systems   Constitutional: Negative for chills and fever.   HENT: Negative for congestion and sore throat.    Eyes: Negative for visual disturbance.   Respiratory: Negative for shortness of breath.    Cardiovascular: Negative for chest pain.   Gastrointestinal: Negative for abdominal pain, diarrhea, nausea and vomiting.   Genitourinary: Negative for difficulty urinating. "   Musculoskeletal: Positive for back pain and myalgias.   Skin: Negative for rash.   Allergic/Immunologic: Negative for immunocompromised state.   Neurological: Negative for headaches.       Physical Exam     Initial Vitals [12/02/19 1301]   BP Pulse Resp Temp SpO2   121/80 77 12 98.1 °F (36.7 °C) 99 %      MAP       --         Physical Exam    Constitutional: Vital signs are normal. She is cooperative.   HENT:   Head: Normocephalic and atraumatic.   Eyes: Conjunctivae and EOM are normal.   Neck: Normal range of motion. Neck supple.   Cardiovascular: Normal rate, regular rhythm and intact distal pulses.   Pulmonary/Chest: Breath sounds normal. She has no wheezes. She has no rhonchi. She has no rales.   Musculoskeletal:   No CT L midline tenderness, crepitus, masses, step-offs or deformities.  Muscular tenderness to palpation to the paraspinal muscles in the left lumbar region.  Modified straight leg raise is  Negative but low back pain is reproduced with this.   Neurological: She is alert and oriented to person, place, and time. GCS eye subscore is 4. GCS verbal subscore is 5. GCS motor subscore is 6.   Strength 5/5 to bilateral lower extremities.  Normal sensation to light touch.Ambulatory with steady gait.   Skin: Skin is warm and dry. No rash noted.         ED Course   Procedures  Labs Reviewed - No data to display       Imaging Results    None          Medical Decision Making:   Initial Assessment:   Urgent evaluation of a 64 y.o. female presenting to the emergency department complaining of back pain after heavy lifting.. Patient is afebrile, nontoxic appearing and hemodynamically stable.  The patient's back pain is likely a musculoskeletal strain with sciatica.  There are no signs of saddle anesthesia, incontinence, neurologic deficits, fevers, trauma or midline tenderness on history or physical to suggest cauda equina, infectious process, fracture or subluxation.  I will treat with anti-inflammatories and  muscle relaxers for relief.   Patient encouraged to follow up with primary care or Orthopedics.  She had no other complaints today and was stable at discharge.                                 Clinical Impression:     1. Acute left-sided low back pain with left-sided sciatica                            Tyrone Ramirez PA-C  12/02/19 0372

## 2019-12-02 NOTE — ED TRIAGE NOTES
"Patient presents to ER with c/o lower back pain since Friday.  Pain 10/10.  Patient states the pain started after going shopping.  Patient states 'I've had sciatica pain but not like this."  Patient denies lifting or moving anything heavy.  "

## 2020-01-28 ENCOUNTER — HOSPITAL ENCOUNTER (INPATIENT)
Facility: OTHER | Age: 65
LOS: 2 days | Discharge: HOME | DRG: 065 | End: 2020-01-30
Attending: EMERGENCY MEDICINE | Admitting: EMERGENCY MEDICINE
Payer: MEDICARE

## 2020-01-28 DIAGNOSIS — I10 ESSENTIAL HYPERTENSION: ICD-10-CM

## 2020-01-28 DIAGNOSIS — E11.9 TYPE 2 DIABETES MELLITUS WITHOUT COMPLICATION, WITHOUT LONG-TERM CURRENT USE OF INSULIN: ICD-10-CM

## 2020-01-28 DIAGNOSIS — R53.1 LEFT-SIDED WEAKNESS: Primary | ICD-10-CM

## 2020-01-28 DIAGNOSIS — R29.90 STROKE-LIKE SYMPTOMS: ICD-10-CM

## 2020-01-28 DIAGNOSIS — I63.9 STROKE: ICD-10-CM

## 2020-01-28 DIAGNOSIS — I63.9 CVA (CEREBRAL VASCULAR ACCIDENT): ICD-10-CM

## 2020-01-28 LAB
BASOPHILS # BLD AUTO: 0.04 K/UL (ref 0–0.2)
BASOPHILS NFR BLD: 0.5 % (ref 0–1.9)
CREAT SERPL-MCNC: 1 MG/DL (ref 0.5–1.4)
DIFFERENTIAL METHOD: NORMAL
EOSINOPHIL # BLD AUTO: 0.2 K/UL (ref 0–0.5)
EOSINOPHIL NFR BLD: 2.5 % (ref 0–8)
ERYTHROCYTE [DISTWIDTH] IN BLOOD BY AUTOMATED COUNT: 11.9 % (ref 11.5–14.5)
HCT VFR BLD AUTO: 39.9 % (ref 37–48.5)
HGB BLD-MCNC: 13.5 G/DL (ref 12–16)
IMM GRANULOCYTES # BLD AUTO: 0.02 K/UL (ref 0–0.04)
IMM GRANULOCYTES NFR BLD AUTO: 0.2 % (ref 0–0.5)
INR PPP: 1 (ref 0.8–1.2)
LYMPHOCYTES # BLD AUTO: 3.8 K/UL (ref 1–4.8)
LYMPHOCYTES NFR BLD: 45.4 % (ref 18–48)
MCH RBC QN AUTO: 30.8 PG (ref 27–31)
MCHC RBC AUTO-ENTMCNC: 33.8 G/DL (ref 32–36)
MCV RBC AUTO: 91 FL (ref 82–98)
MONOCYTES # BLD AUTO: 0.5 K/UL (ref 0.3–1)
MONOCYTES NFR BLD: 6.5 % (ref 4–15)
NEUTROPHILS # BLD AUTO: 3.7 K/UL (ref 1.8–7.7)
NEUTROPHILS NFR BLD: 44.9 % (ref 38–73)
NRBC BLD-RTO: 0 /100 WBC
PLATELET # BLD AUTO: 213 K/UL (ref 150–350)
PMV BLD AUTO: 9.3 FL (ref 9.2–12.9)
POC PTINR: 1.1 (ref 0.9–1.2)
POC PTWBT: 13 SEC (ref 9.7–14.3)
POCT GLUCOSE: 197 MG/DL (ref 70–110)
PROTHROMBIN TIME: 10.7 SEC (ref 9–12.5)
RBC # BLD AUTO: 4.38 M/UL (ref 4–5.4)
SAMPLE: NORMAL
SAMPLE: NORMAL
WBC # BLD AUTO: 8.26 K/UL (ref 3.9–12.7)

## 2020-01-28 PROCEDURE — 80061 LIPID PANEL: CPT

## 2020-01-28 PROCEDURE — 80053 COMPREHEN METABOLIC PANEL: CPT

## 2020-01-28 PROCEDURE — 93010 EKG 12-LEAD: ICD-10-PCS | Mod: ,,, | Performed by: INTERNAL MEDICINE

## 2020-01-28 PROCEDURE — 93010 ELECTROCARDIOGRAM REPORT: CPT | Mod: ,,, | Performed by: INTERNAL MEDICINE

## 2020-01-28 PROCEDURE — 84443 ASSAY THYROID STIM HORMONE: CPT

## 2020-01-28 PROCEDURE — 12000002 HC ACUTE/MED SURGE SEMI-PRIVATE ROOM

## 2020-01-28 PROCEDURE — 99900035 HC TECH TIME PER 15 MIN (STAT)

## 2020-01-28 PROCEDURE — 85025 COMPLETE CBC W/AUTO DIFF WBC: CPT

## 2020-01-28 PROCEDURE — 82565 ASSAY OF CREATININE: CPT

## 2020-01-28 PROCEDURE — 36415 COLL VENOUS BLD VENIPUNCTURE: CPT

## 2020-01-28 PROCEDURE — 93005 ELECTROCARDIOGRAM TRACING: CPT

## 2020-01-28 PROCEDURE — 85610 PROTHROMBIN TIME: CPT

## 2020-01-28 PROCEDURE — 99285 EMERGENCY DEPT VISIT HI MDM: CPT | Mod: 25

## 2020-01-29 PROBLEM — R29.90 STROKE-LIKE SYMPTOMS: Status: ACTIVE | Noted: 2020-01-29

## 2020-01-29 PROBLEM — R53.1 LEFT-SIDED WEAKNESS: Status: ACTIVE | Noted: 2020-01-29

## 2020-01-29 LAB
ALBUMIN SERPL BCP-MCNC: 3.6 G/DL (ref 3.5–5.2)
ALBUMIN SERPL BCP-MCNC: 3.8 G/DL (ref 3.5–5.2)
ALP SERPL-CCNC: 69 U/L (ref 55–135)
ALP SERPL-CCNC: 71 U/L (ref 55–135)
ALT SERPL W/O P-5'-P-CCNC: 18 U/L (ref 10–44)
ALT SERPL W/O P-5'-P-CCNC: 18 U/L (ref 10–44)
ANION GAP SERPL CALC-SCNC: 10 MMOL/L (ref 8–16)
ANION GAP SERPL CALC-SCNC: 10 MMOL/L (ref 8–16)
APTT BLDCRRT: 29.9 SEC (ref 21–32)
AST SERPL-CCNC: 14 U/L (ref 10–40)
AST SERPL-CCNC: 15 U/L (ref 10–40)
BILIRUB SERPL-MCNC: 0.4 MG/DL (ref 0.1–1)
BILIRUB SERPL-MCNC: 0.4 MG/DL (ref 0.1–1)
BILIRUB UR QL STRIP: NEGATIVE
BUN SERPL-MCNC: 11 MG/DL (ref 8–23)
BUN SERPL-MCNC: 12 MG/DL (ref 8–23)
CALCIUM SERPL-MCNC: 9.4 MG/DL (ref 8.7–10.5)
CALCIUM SERPL-MCNC: 9.4 MG/DL (ref 8.7–10.5)
CHLORIDE SERPL-SCNC: 103 MMOL/L (ref 95–110)
CHLORIDE SERPL-SCNC: 105 MMOL/L (ref 95–110)
CHOLEST SERPL-MCNC: 119 MG/DL (ref 120–199)
CHOLEST/HDLC SERPL: 2.8 {RATIO} (ref 2–5)
CK MB SERPL-MCNC: 0.7 NG/ML (ref 0.1–6.5)
CK MB SERPL-RTO: 1.9 % (ref 0–5)
CK SERPL-CCNC: 37 U/L (ref 20–180)
CLARITY UR: CLEAR
CO2 SERPL-SCNC: 27 MMOL/L (ref 23–29)
CO2 SERPL-SCNC: 27 MMOL/L (ref 23–29)
COLOR UR: YELLOW
CREAT SERPL-MCNC: 0.9 MG/DL (ref 0.5–1.4)
CREAT SERPL-MCNC: 1.1 MG/DL (ref 0.5–1.4)
EST. GFR  (AFRICAN AMERICAN): >60 ML/MIN/1.73 M^2
EST. GFR  (AFRICAN AMERICAN): >60 ML/MIN/1.73 M^2
EST. GFR  (NON AFRICAN AMERICAN): 53 ML/MIN/1.73 M^2
EST. GFR  (NON AFRICAN AMERICAN): >60 ML/MIN/1.73 M^2
ESTIMATED AVG GLUCOSE: 97 MG/DL (ref 68–131)
GLUCOSE SERPL-MCNC: 120 MG/DL (ref 70–110)
GLUCOSE SERPL-MCNC: 183 MG/DL (ref 70–110)
GLUCOSE UR QL STRIP: NEGATIVE
HBA1C MFR BLD HPLC: 5 % (ref 4–5.6)
HDLC SERPL-MCNC: 42 MG/DL (ref 40–75)
HDLC SERPL: 35.3 % (ref 20–50)
HGB UR QL STRIP: NEGATIVE
INR PPP: 1 (ref 0.8–1.2)
KETONES UR QL STRIP: NEGATIVE
LDLC SERPL CALC-MCNC: 63.4 MG/DL (ref 63–159)
LEUKOCYTE ESTERASE UR QL STRIP: NEGATIVE
MAGNESIUM SERPL-MCNC: 2 MG/DL (ref 1.6–2.6)
NITRITE UR QL STRIP: NEGATIVE
NONHDLC SERPL-MCNC: 77 MG/DL
PH UR STRIP: 6 [PH] (ref 5–8)
PHOSPHATE SERPL-MCNC: 3.7 MG/DL (ref 2.7–4.5)
POCT GLUCOSE: 113 MG/DL (ref 70–110)
POCT GLUCOSE: 137 MG/DL (ref 70–110)
POTASSIUM SERPL-SCNC: 3.2 MMOL/L (ref 3.5–5.1)
POTASSIUM SERPL-SCNC: 3.3 MMOL/L (ref 3.5–5.1)
PROT SERPL-MCNC: 6.9 G/DL (ref 6–8.4)
PROT SERPL-MCNC: 7.3 G/DL (ref 6–8.4)
PROT UR QL STRIP: NEGATIVE
PROTHROMBIN TIME: 10.7 SEC (ref 9–12.5)
SODIUM SERPL-SCNC: 140 MMOL/L (ref 136–145)
SODIUM SERPL-SCNC: 142 MMOL/L (ref 136–145)
SP GR UR STRIP: <=1.005 (ref 1–1.03)
TRIGL SERPL-MCNC: 68 MG/DL (ref 30–150)
TROPONIN I SERPL DL<=0.01 NG/ML-MCNC: <0.006 NG/ML (ref 0–0.03)
TSH SERPL DL<=0.005 MIU/L-ACNC: 1.7 UIU/ML (ref 0.4–4)
URN SPEC COLLECT METH UR: ABNORMAL
UROBILINOGEN UR STRIP-ACNC: NEGATIVE EU/DL

## 2020-01-29 PROCEDURE — 92610 EVALUATE SWALLOWING FUNCTION: CPT

## 2020-01-29 PROCEDURE — 84100 ASSAY OF PHOSPHORUS: CPT

## 2020-01-29 PROCEDURE — 25000003 PHARM REV CODE 250: Performed by: EMERGENCY MEDICINE

## 2020-01-29 PROCEDURE — 82553 CREATINE MB FRACTION: CPT

## 2020-01-29 PROCEDURE — 80053 COMPREHEN METABOLIC PANEL: CPT

## 2020-01-29 PROCEDURE — 83735 ASSAY OF MAGNESIUM: CPT

## 2020-01-29 PROCEDURE — 85610 PROTHROMBIN TIME: CPT

## 2020-01-29 PROCEDURE — 99223 PR INITIAL HOSPITAL CARE,LEVL III: ICD-10-PCS | Mod: ,,, | Performed by: NURSE PRACTITIONER

## 2020-01-29 PROCEDURE — 99223 1ST HOSP IP/OBS HIGH 75: CPT | Mod: ,,, | Performed by: NURSE PRACTITIONER

## 2020-01-29 PROCEDURE — 97162 PT EVAL MOD COMPLEX 30 MIN: CPT

## 2020-01-29 PROCEDURE — 97802 MEDICAL NUTRITION INDIV IN: CPT

## 2020-01-29 PROCEDURE — 36415 COLL VENOUS BLD VENIPUNCTURE: CPT

## 2020-01-29 PROCEDURE — 83036 HEMOGLOBIN GLYCOSYLATED A1C: CPT

## 2020-01-29 PROCEDURE — A9585 GADOBUTROL INJECTION: HCPCS | Performed by: HOSPITALIST

## 2020-01-29 PROCEDURE — 25500020 PHARM REV CODE 255: Performed by: HOSPITALIST

## 2020-01-29 PROCEDURE — 11000001 HC ACUTE MED/SURG PRIVATE ROOM

## 2020-01-29 PROCEDURE — 97165 OT EVAL LOW COMPLEX 30 MIN: CPT

## 2020-01-29 PROCEDURE — 85730 THROMBOPLASTIN TIME PARTIAL: CPT

## 2020-01-29 PROCEDURE — 25000003 PHARM REV CODE 250: Performed by: HOSPITALIST

## 2020-01-29 PROCEDURE — 94761 N-INVAS EAR/PLS OXIMETRY MLT: CPT

## 2020-01-29 PROCEDURE — 82550 ASSAY OF CK (CPK): CPT

## 2020-01-29 PROCEDURE — 81003 URINALYSIS AUTO W/O SCOPE: CPT

## 2020-01-29 PROCEDURE — 25000003 PHARM REV CODE 250: Performed by: NURSE PRACTITIONER

## 2020-01-29 PROCEDURE — 84484 ASSAY OF TROPONIN QUANT: CPT

## 2020-01-29 RX ORDER — GADOBUTROL 604.72 MG/ML
10 INJECTION INTRAVENOUS
Status: COMPLETED | OUTPATIENT
Start: 2020-01-29 | End: 2020-01-29

## 2020-01-29 RX ORDER — IBUPROFEN 200 MG
24 TABLET ORAL
Status: DISCONTINUED | OUTPATIENT
Start: 2020-01-29 | End: 2020-01-30 | Stop reason: HOSPADM

## 2020-01-29 RX ORDER — ASPIRIN 81 MG/1
81 TABLET ORAL DAILY
Status: DISCONTINUED | OUTPATIENT
Start: 2020-01-29 | End: 2020-01-30 | Stop reason: HOSPADM

## 2020-01-29 RX ORDER — ATORVASTATIN CALCIUM 20 MG/1
40 TABLET, FILM COATED ORAL DAILY
Status: DISCONTINUED | OUTPATIENT
Start: 2020-01-29 | End: 2020-01-29

## 2020-01-29 RX ORDER — ATORVASTATIN CALCIUM 20 MG/1
80 TABLET, FILM COATED ORAL DAILY
Status: DISCONTINUED | OUTPATIENT
Start: 2020-01-29 | End: 2020-01-30 | Stop reason: HOSPADM

## 2020-01-29 RX ORDER — OXYBUTYNIN CHLORIDE 5 MG/1
5 TABLET, EXTENDED RELEASE ORAL DAILY
Status: DISCONTINUED | OUTPATIENT
Start: 2020-01-29 | End: 2020-01-30 | Stop reason: HOSPADM

## 2020-01-29 RX ORDER — TRAZODONE HYDROCHLORIDE 50 MG/1
50 TABLET ORAL NIGHTLY
Status: DISCONTINUED | OUTPATIENT
Start: 2020-01-29 | End: 2020-01-30 | Stop reason: HOSPADM

## 2020-01-29 RX ORDER — CLOPIDOGREL BISULFATE 75 MG/1
75 TABLET ORAL DAILY
Status: DISCONTINUED | OUTPATIENT
Start: 2020-01-29 | End: 2020-01-30 | Stop reason: HOSPADM

## 2020-01-29 RX ORDER — TIZANIDINE 4 MG/1
4 TABLET ORAL EVERY 8 HOURS PRN
Status: DISCONTINUED | OUTPATIENT
Start: 2020-01-29 | End: 2020-01-30 | Stop reason: HOSPADM

## 2020-01-29 RX ORDER — IBUPROFEN 200 MG
16 TABLET ORAL
Status: DISCONTINUED | OUTPATIENT
Start: 2020-01-29 | End: 2020-01-30 | Stop reason: HOSPADM

## 2020-01-29 RX ORDER — GLUCAGON 1 MG
1 KIT INJECTION
Status: DISCONTINUED | OUTPATIENT
Start: 2020-01-29 | End: 2020-01-30 | Stop reason: HOSPADM

## 2020-01-29 RX ORDER — POTASSIUM CHLORIDE 20 MEQ/1
40 TABLET, EXTENDED RELEASE ORAL
Status: COMPLETED | OUTPATIENT
Start: 2020-01-29 | End: 2020-01-29

## 2020-01-29 RX ORDER — SODIUM CHLORIDE 0.9 % (FLUSH) 0.9 %
10 SYRINGE (ML) INJECTION
Status: DISCONTINUED | OUTPATIENT
Start: 2020-01-29 | End: 2020-01-30 | Stop reason: HOSPADM

## 2020-01-29 RX ORDER — LABETALOL HYDROCHLORIDE 5 MG/ML
10 INJECTION, SOLUTION INTRAVENOUS
Status: DISCONTINUED | OUTPATIENT
Start: 2020-01-29 | End: 2020-01-30 | Stop reason: HOSPADM

## 2020-01-29 RX ORDER — ONDANSETRON 8 MG/1
8 TABLET, ORALLY DISINTEGRATING ORAL EVERY 8 HOURS PRN
Status: DISCONTINUED | OUTPATIENT
Start: 2020-01-29 | End: 2020-01-30 | Stop reason: HOSPADM

## 2020-01-29 RX ORDER — LISINOPRIL 20 MG/1
40 TABLET ORAL DAILY
Status: DISCONTINUED | OUTPATIENT
Start: 2020-01-30 | End: 2020-01-30 | Stop reason: HOSPADM

## 2020-01-29 RX ORDER — ASPIRIN 325 MG
325 TABLET, DELAYED RELEASE (ENTERIC COATED) ORAL
Status: COMPLETED | OUTPATIENT
Start: 2020-01-29 | End: 2020-01-29

## 2020-01-29 RX ORDER — OXYCODONE AND ACETAMINOPHEN 10; 325 MG/1; MG/1
1 TABLET ORAL ONCE
Status: COMPLETED | OUTPATIENT
Start: 2020-01-29 | End: 2020-01-29

## 2020-01-29 RX ORDER — ASPIRIN 81 MG/1
81 TABLET ORAL DAILY
Status: DISCONTINUED | OUTPATIENT
Start: 2020-01-29 | End: 2020-01-29

## 2020-01-29 RX ADMIN — TRAZODONE HYDROCHLORIDE 50 MG: 50 TABLET ORAL at 09:01

## 2020-01-29 RX ADMIN — ATORVASTATIN CALCIUM 80 MG: 20 TABLET, FILM COATED ORAL at 08:01

## 2020-01-29 RX ADMIN — ASPIRIN 81 MG: 81 TABLET ORAL at 08:01

## 2020-01-29 RX ADMIN — OXYBUTYNIN CHLORIDE 5 MG: 5 TABLET, EXTENDED RELEASE ORAL at 08:01

## 2020-01-29 RX ADMIN — OXYCODONE HYDROCHLORIDE AND ACETAMINOPHEN 1 TABLET: 10; 325 TABLET ORAL at 05:01

## 2020-01-29 RX ADMIN — GADOBUTROL 10 ML: 604.72 INJECTION INTRAVENOUS at 06:01

## 2020-01-29 RX ADMIN — CLOPIDOGREL BISULFATE 75 MG: 75 TABLET ORAL at 08:01

## 2020-01-29 RX ADMIN — POTASSIUM CHLORIDE 40 MEQ: 1500 TABLET, EXTENDED RELEASE ORAL at 12:01

## 2020-01-29 RX ADMIN — ASPIRIN 325 MG: 325 TABLET, COATED ORAL at 01:01

## 2020-01-29 NOTE — PLAN OF CARE
Problem: Malnutrition  Goal: Improved Nutritional Intake  Outcome: Ongoing, Progressing     Intervention: Sodium/CHO modified diet and nutrition supplement therapy-commercial beverage    Recommendations    1. Recommend Cardiac/ Diabetic diet.   2. Recommend Boost glucose TID.   3. Encourage PO intake of meals and commercial beverage.     Goals: 1. >75% of EEN, EP by RD follow up.   Nutrition Goal Status: new

## 2020-01-29 NOTE — PLAN OF CARE
OT evaluation completed with no hospital based OT treatment needs identified.  Recommend Home Health or Out-Pt OT for Left hand rehabilitation.  Recommend an Out-Pt OT driving evaluation to ensure safety.  DME: None.  Estefania Cool, Terry, LOTR 1/29/2020

## 2020-01-29 NOTE — PLAN OF CARE
POC reviewed with patient. Patient resting in NAD. VSS on RA. NSR on telemetry. Neuro checks performed Q4H. Safety measures maintained. Pt instructed to use call light for assistance. Will continue to monitor.

## 2020-01-29 NOTE — CONSULTS
"  [facility]  Adult Nutrition  Consult Note    SUMMARY     Intervention: Sodium/CHO modified diet and nutrition supplement therapy-commercial beverage    Recommendations    1. Recommend Cardiac/ Diabetic diet.   2. Recommend Boost glucose TID.   3. Encourage PO intake of meals and commercial beverage.     Goals: 1. >75% of EEN, EP by RD follow up.   Nutrition Goal Status: new    Reason for Assessment    Reason For Assessment: consult  Diagnosis: stroke/CVA  Relevant Medical History: HTN, Stroke, DM  Interdisciplinary Rounds: did not attend  General Information Comments: Pt is a 64 y.o. female who presents with complaint of left sided extremity weakness. Pt had 2 meyer in 2019. Pt  lbs. -7.78% weight loss x 4 months. Pt states she had a decreased appetite but she is hungry again. PO intake 50% of meals.  NFPE 1.29.20- mild depletion of Orbital Region, Upper Arm Region, Temple Region,Clavicle Bone Region and lower extremities.  Nutrition Discharge Planning: Adequate nutrition to meet needs via cardiac diet.     Nutrition Risk Screen    Nutrition Risk Screen: no indicators present    Nutrition/Diet History    Food Preferences: Pt would like turkey sausage at breakfast meal.   Spiritual, Cultural Beliefs, Scientology Practices, Values that Affect Care: no    Anthropometrics    Height Method: Measured  Height: 5' 9" (175.3 cm)  Height (inches): 69 in  Weight Method: Bed Scale  Weight: 77.1 kg (169 lb 15.6 oz)  Weight (lb): 169.98 lb  Ideal Body Weight (IBW), Female: 145 lb  % Ideal Body Weight, Female (lb): 117.23 %  BMI (Calculated): 25.1  BMI Grade: 25 - 29.9 - overweight  Usual Body Weight (UBW), k.6 kg  % Usual Body Weight: 92.42  % Weight Change From Usual Weight: -7.78 %     Lab/Procedures/Meds    Pertinent Labs Reviewed: reviewed  Pertinent Labs Comments: K 3.3, Glucose 120  Pertinent Medications Reviewed: reviewed  Pertinent Medications Comments: Atorvastatin    Estimated/Assessed " Needs    Weight Used For Calorie Calculations: 77.1 kg (169 lb 15.6 oz)  Energy Calorie Requirements (kcal): 1662 kcals/day(x 1.2)  Energy Need Method: [place]-St Jeor  Protein Requirements: 61.81-77.26 g/day(0.8-1.0 g/kg)  Weight Used For Protein Calculations: 77.1 kg (169 lb 15.6 oz)  Fluid Requirements (mL): 1mL/kcal or per MD  Estimated Fluid Requirement Method: RDA Method  RDA Method (mL): 1662  CHO Requirement: 207 g CHO    Nutrition Prescription Ordered    Current Diet Order: Cardaic    Evaluation of Received Nutrient/Fluid Intake    Energy Calories Required: not meeting needs  Protein Required: not meeting needs  Fluid Required: meeting needs  Comments: LBM 1.28.20  % Intake of Estimated Energy Needs:  50 %  % Meal Intake: 50 %    Nutrition Risk    Level of Risk/Frequency of Follow-up: moderate     Assessment and Plan    Severe malnutrition  Malnutrition in the context of Acute Illness/Injury     Related to (etiology):  Inadequate energy intake      Signs and Symptoms (as evidenced by):  Energy Intake: <75% of estimated energy requirement for 1 week       Weight Loss: greater than 7.5% in 3 months  Body Fat Depletion: mild depletion of orbitals and Upper Arm Region  Muscle Mass Depletion: mild depletion of temples, clavicle region, and lower extremities      Interventions/Recommendations (treatment strategy):  Collaboration with other providers  Oral supplements    Nutrition Diagnosis Status:  New    Monitor and Evaluation    Food and Nutrient Intake: food and beverage intake  Food and Nutrient Adminstration: diet order  Knowledge/Beliefs/Attitudes: food and nutrition knowledge/skill  Physical Activity and Function: nutrition-related ADLs and IADLs  Anthropometric Measurements: weight  Biochemical Data, Medical Tests and Procedures: glucose/endocrine profile, lipid profile  Nutrition-Focused Physical Findings: overall appearance     Malnutrition Assessment    Chas Score: 20  Malnutrition Type: acute  illness or injury  Weight Loss (Malnutrition): greater than 7.5% in 3 months  Energy Intake (Malnutrition): less than 75% for greater than 7 days   Orbital Region (Subcutaneous Fat Loss): mild depletion  Upper Arm Region (Subcutaneous Fat Loss): mild depletion  Thoracic and Lumbar Region: well nourished   [place] Region (Muscle Loss): mild depletion  Clavicle Bone Region (Muscle Loss): mild depletion  Clavicle and Acromion Bone Region (Muscle Loss): mild depletion  Scapular Bone Region (Muscle Loss): mild depletion  Dorsal Hand (Muscle Loss): well nourished  Patellar Region (Muscle Loss): mild depletion  Anterior Thigh Region (Muscle Loss): mild depletion  Posterior Calf Region (Muscle Loss): mild depletion   Edema (Fluid Accumulation): 0-->no edema present        Nutrition Follow-Up    RD Follow-up?: Yes

## 2020-01-29 NOTE — PLAN OF CARE
Problem: Physical Therapy Goal  Goal: Physical Therapy Goal  Description  Goals to be met by: 2/29/20    Patient will perform the following to increase strength, improve mobility, and return to prior level of function:    1. Supine <> sit with mod I.  2. Sit<>stand with independence with least restrictive assistive device.  3. Gait x 400 feet with independence with least restrictive assistive device.  4. Ascend/descend 4 step(s) with left handrail and mod I.    Outcome: Ongoing, Progressing     PT orders received. PT evaluation completed and goals/POC established. Pt tolerated evaluation well with no adverse reactions. Pt performed bed mobility, transfers, and ambulation x 150 ft with CGA. Pt will benefit from skilled PT services to address impairments and functional limitations. Recommend discharge to home.

## 2020-01-29 NOTE — SUBJECTIVE & OBJECTIVE
Past Medical History:   Diagnosis Date    Arthritis     Back pain with sciatica     Diabetes mellitus     Hypertension     Stroke        No past surgical history on file.    Review of patient's allergies indicates:  No Known Allergies    No current facility-administered medications on file prior to encounter.      Current Outpatient Medications on File Prior to Encounter   Medication Sig    aspirin (ECOTRIN) 81 MG EC tablet Take 1 tablet (81 mg total) by mouth once daily.    carvedilol (COREG) 25 MG tablet Take 25 mg by mouth 2 (two) times daily with meals.    clopidogrel (PLAVIX) 75 mg tablet Take 1 tablet (75 mg total) by mouth once daily.    lisinopril (PRINIVIL,ZESTRIL) 20 MG tablet Take 40 mg by mouth once daily.     atorvastatin (LIPITOR) 80 MG tablet Take 1 tablet (80 mg total) by mouth once daily.    lidocaine (LIDODERM) 5 % Place 1 patch onto the skin once daily. Up to 3 patches may be applied in a single application. Patch(es) may remain in place for up to 12 hours in any 24-hour period.    metformin (GLUCOPHAGE) 500 MG tablet Take 1 tablet (500 mg total) by mouth 2 (two) times daily with meals. (Patient taking differently: Take 500 mg by mouth daily with breakfast. )    oxybutynin (DITROPAN-XL) 5 MG TR24 Take 1 tablet (5 mg total) by mouth once daily.    tiZANidine (ZANAFLEX) 2 MG tablet Take 2 tablets (4 mg total) by mouth every 8 (eight) hours as needed (muscle aches).    traZODone (DESYREL) 50 MG tablet Take 50 mg by mouth every evening.     Family History     Problem Relation (Age of Onset)    Cancer Brother        Tobacco Use    Smoking status: Current Some Day Smoker     Packs/day: 0.50     Years: 40.00     Pack years: 20.00     Types: Cigarettes    Smokeless tobacco: Never Used   Substance and Sexual Activity    Alcohol use: No     Comment: Recently stopped    Drug use: No    Sexual activity: Not Currently     Review of Systems   Constitutional: Negative for activity change,  appetite change and fever.   HENT: Negative for congestion, ear pain, rhinorrhea and sinus pressure.    Eyes: Negative for pain and discharge.   Respiratory: Negative for cough, chest tightness, shortness of breath and wheezing.    Cardiovascular: Negative for chest pain and leg swelling.   Gastrointestinal: Negative for abdominal distention, abdominal pain, diarrhea, nausea and vomiting.   Endocrine: Negative for cold intolerance and heat intolerance.   Genitourinary: Negative for difficulty urinating, flank pain, frequency, hematuria and urgency.   Musculoskeletal: Positive for myalgias. Negative for arthralgias and joint swelling.   Allergic/Immunologic: Negative for environmental allergies and food allergies.   Neurological: Positive for weakness (left sided). Negative for dizziness, light-headedness and headaches.   Hematological: Does not bruise/bleed easily.   Psychiatric/Behavioral: Negative for agitation, behavioral problems and decreased concentration.     Objective:     Vital Signs (Most Recent):  Temp: 97.9 °F (36.6 °C) (01/29/20 0204)  Pulse: 69 (01/29/20 0241)  Resp: 18 (01/29/20 0204)  BP: (!) 141/69 (01/29/20 0204)  SpO2: 96 % (01/29/20 0204) Vital Signs (24h Range):  Temp:  [97.7 °F (36.5 °C)-97.9 °F (36.6 °C)] 97.9 °F (36.6 °C)  Pulse:  [56-69] 69  Resp:  [15-18] 18  SpO2:  [95 %-98 %] 96 %  BP: (139-169)/(67-93) 141/69     Weight: 77.1 kg (169 lb 15.6 oz)  Body mass index is 25.1 kg/m².    Physical Exam   Constitutional: She is oriented to person, place, and time. She appears well-developed and well-nourished.   HENT:   Head: Normocephalic.   Eyes: Conjunctivae are normal. Right eye exhibits no discharge. Left eye exhibits no discharge.   Neck: Normal range of motion. Neck supple.   Cardiovascular: Normal rate, regular rhythm and normal heart sounds.   Pulses:       Radial pulses are 2+ on the right side, and 2+ on the left side.   Pulmonary/Chest: Effort normal. Tachypnea noted. No respiratory  distress. She has decreased breath sounds in the right lower field and the left lower field.   Abdominal: Soft. She exhibits no distension. Bowel sounds are decreased. There is no tenderness.   Musculoskeletal: Normal range of motion.   Neurological: She is alert and oriented to person, place, and time. She has normal strength. GCS eye subscore is 4. GCS verbal subscore is 5. GCS motor subscore is 6.   Skin: Skin is warm and dry.   Psychiatric: She has a normal mood and affect. Her speech is normal and behavior is normal.           Significant Labs:   CBC:   Recent Labs   Lab 01/28/20  2336   WBC 8.26   HGB 13.5   HCT 39.9        CMP:   Recent Labs   Lab 01/28/20  2336      K 3.2*      CO2 27   *   BUN 11   CREATININE 1.1   CALCIUM 9.4   PROT 7.3   ALBUMIN 3.8   BILITOT 0.4   ALKPHOS 71   AST 14   ALT 18   ANIONGAP 10   EGFRNONAA 53*       Significant Imaging: I have reviewed all pertinent imaging results/findings within the past 24 hours.

## 2020-01-29 NOTE — PT/OT/SLP EVAL
Physical Therapy Evaluation    Patient Name:  Kaila Scott   MRN:  [id number]    Recommendations:     Discharge Recommendations:  home   Discharge Equipment Recommendations: none   Barriers to discharge: Decreased caregiver support    Assessment:     Kaila Scott is a 64 y.o. female admitted with a medical diagnosis of Stroke-like symptoms.  She presents with the following impairments/functional limitations:  gait instability, impaired balance, decreased coordination.    PT orders received. PT evaluation completed and goals/POC established. Pt tolerated evaluation well with no adverse reactions. Pt performed bed mobility, transfers, and ambulation x 150 ft with CGA. Pt will benefit from skilled PT services to address impairments and functional limitations. Recommend discharge to home.     Rehab Prognosis: Good; patient would benefit from acute skilled PT services to address these deficits and reach maximum level of function.    Recent Surgery: * No surgery found *      Plan:     During this hospitalization, patient to be seen 5 x/week to address the identified rehab impairments via gait training, therapeutic activities, therapeutic exercises, neuromuscular re-education and progress toward the following goals:    · Plan of Care Expires:  02/29/20    Subjective     Chief Complaint: None stated  Patient/Family Comments/goals: None stated  Pain/Comfort:  · Pain Rating 1: 0/10  · Pain Rating Post-Intervention 1: 0/10    Patients cultural, spiritual, Taoism conflicts given the current situation: no(None stated)    Living Environment:  · Pt lives alone in a single story house with 4 steps to enter and left handrail(s).   · Pt has a tub shower with grab bars.   · DME owned: Shower chair and Rollator  · Upon discharge, patient will not have assistance at home.  Prior level of function:  · Ambulation: Independent  · ADL's: Independent  · IADLs: Independent  · Falls: Reports fall last Thursday, but no other falls  reported    Objective:     Communicated with RN (Shabnam) prior to session.  Patient found supine with peripheral IV  upon PT entry to room.    General Precautions: Standard, fall   Orthopedic Precautions:N/A   Braces: N/A     Exams:  · Cognition:   · Patient is oriented to person, place, time, and situation.  · Pt follows approximately 100% of multiple step commands.    · Mood: Pleasant and cooperative.   · Musculoskeletal:  · Posture:  Forward head, rounded shoulders  · LE ROM/Strength:   · R ROM: WNL  · L ROM: WNL  · R Strength:   · Hip flexion: 5/5  · Knee extension: 5/5  · Dorsiflexion: 5/5   · L Strength:   · Hip flexion: 5/5  · Knee extension: 5/5  · Dorsiflexion: 5/5   · Neuromuscular:  · Sensation: Intact to light touch bilateral LEs.   · Tone/Reflexes/Coordination: No impairments identified with functional mobility. No formal testing performed.   · Balance:   · Static sitting: SBA  · Dynamic sitting: SBA  · Static standing: SBA  · Dynamic standing: CGA  · Visual-vestibular: No impairments identified with functional mobility. No formal testing performed.  · Integument: Visible skin intact    Functional Mobility:  · Bed Mobility:     · Supine to Sit: stand by assistance  · Sit to Supine: stand by assistance  · Transfers:     · Sit to Stand:  contact guard assistance with no AD  · Gait: 150 ft with contact guard assistance and no AD.   · Demonstrated decreased zulma and slight unsteadiness with ambulation, but no loss of balance.    Therapeutic Activities and Exercises:   Bed mobility, transfer, and gait training as described above.    AM-PAC 6 CLICK MOBILITY  Total Score:22     Patient left supine with all lines intact and call button in reach.    GOALS:   Multidisciplinary Problems     Physical Therapy Goals        Problem: Physical Therapy Goal    Goal Priority Disciplines Outcome Goal Variances Interventions   Physical Therapy Goal     PT, PT/OT Ongoing, Progressing     Description:  Goals to be met by:  2/29/20    Patient will perform the following to increase strength, improve mobility, and return to prior level of function:    1. Supine <> sit with mod I.  2. Sit<>stand with independence with least restrictive assistive device.  3. Gait x 400 feet with independence with least restrictive assistive device.  4. Ascend/descend 4 step(s) with left handrail and mod I.                     History:     Past Medical History:   Diagnosis Date    Arthritis     Back pain with sciatica     Diabetes mellitus     Hypertension     Stroke        No past surgical history on file.    Time Tracking:     PT Received On: 01/29/20  PT Start Time: 1050     PT Stop Time: 1119  PT Total Time (min): 29 min     Billable Minutes: Evaluation 29      Rhonda Gayle, PT  01/29/2020

## 2020-01-29 NOTE — HPI
Ms. Scott is a 64 year old woman who presented with new left sided weakness, especially of her left arm and hand.  Symptoms began 5 days previously when she got up to use the bathroom and fell because her legs felt weak, and then she noticed her arm was weak as well.  Patient has had 2 previous strokes, in December 2018 and again in September 2019.  The strokes caused weakness of her left side and dysarthria.  She takes aspirin and Plavix and is compliant with both of them. Patient did not want to come to the hospital but says her daughter found out she was having new weakness and forced her to come.  Her other complaints include sciatica and lightheadedness.  Patient's labs were normal on presentation.  Vascular neurology was called from the ED and because her symptoms were already 5 days old she was not a candidate for tPA.  She was admitted for stroke workup.    In addition to the strokes listed above patient has type II diabetes for which she takes metformin, and she has hypertension.  She smokes cigarettes.  She has had multiple lacunar infarctions and she has extensive small vessels disease.

## 2020-01-29 NOTE — PT/OT/SLP EVAL
"Speech Language Pathology Evaluation  Bedside Swallow    Patient Name:  Kaila Scott   MRN:  [id number]  Admitting Diagnosis: Stroke-like symptoms    Recommendations:                 General Recommendations:     1. Skilled speech intervention for diet tolerance x1-2   2. Assessment of Cognitive Communication status    Diet recommendations:    1. Regular Solids  2. Thin liquids     Aspiration Precautions: Check for pocketing/oral residue, HOB to 90 degrees and Remain upright 30 minutes post meal     General Precautions: Standard, fall    Communication strategies:  provide verbal cues to slow rate of speech     History:     Past Medical History:   Diagnosis Date    Arthritis     Back pain with sciatica     Diabetes mellitus     Hypertension     Stroke        No past surgical history on file.    Social History: Patient lives alone, reports " no one helps me".  Daughter and patient report that she had received therapy services in past - unsure about specifics of therapies but confirmed home health speech therapy. Then stated patient lost services. Pt reports that she was independent with ADLs prior to this admit. Daughter reports residual slurred speech from previous stroke in September.     Modified Barium Swallow: denies h/x not indicated in medical chart    Imaging: Pending MRA neck, MRI Brain, MRA Brain    Prior diet: regular solids and thin liquids.    This is a 64 y.o. female who presents with complaint of left sided extremity weakness. Pt states the weakness initially began last Thursday when she got up to use the bathroom and fell. She states today, her left side feels completely weak. She states she had 2 strokes in 2019, September and December, but denies left sided weakness after last stroke. She reports back pain and lightheadedness. She denies chills, dysuria, or fever. She states she is compliant with her Plavix and ASA.     Subjective   Pt presents sitting in chair by bathroom, patient stating loudly " that she is upset that she went for testing and was not able to eat and would now like to shower. Daughter is present. Patient reports that slurred speech is residual from prior stroke in September. Daughter reports that slurred speech is worse than baseline and that her speech intelligibility has decreased. Both patient and daughter report that patient received home health speech therapy and other (unspecified therapies) following her stroke and that speech improved.     Patient goals: I want to take a shower now     Pain/Comfort:  · Pain Rating 1: 0/10  · Pain Rating Post-Intervention 1: 0/10    Objective:     Oral Musculature Evaluation  · Oral Musculature: WFL  · Dentition: upper dentures, present and adequate  · Secretion Management: adequate  · Mucosal Quality: adequate  · Mandibular Strength and Mobility: WFL  · Oral Labial Strength and Mobility: WFL  · Lingual Strength and Mobility: (impaired accuracy of movements and dcr rate of movements)  · Velar Elevation: WFL  · Buccal Strength and Mobility: WFL  · Volitional Cough: adequate for ejection of material out of airway  · Volitional Swallow: dry swallow elicited  · Voice Prior to PO Intake: clear with adequate intensity    Bedside Swallow Eval:   Consistencies Assessed:  · Thin liquids single and consecutive straw sips 3 ounces  · Puree 2 1/2 tsp of pudding (pt reported it was too sweet)  · Soft solids x1 bite  · Solids x1 phyllis cracker     Oral Phase:   · Lingual residue with regular solids - cleared with verbally cued secondary swallow and liquid wash  · Adequate labial seal, no anterior loss of liquids or solids  · Timely oral prep and mastication  · Timely oral transit      Pharyngeal Phase:   · no overt clinical signs/symptoms of aspiration  · no overt clinical signs/symptoms of pharyngeal dysphagia   · Timely swallow trigger  · No changes in vocal quality, throat clearing, or coughing  · No overt s/s of pharyngeal residuals     Compensatory  Strategies  · secondary swallow or liquid wash to clear lingual residuals with regular solids    Cognitive communication status:  Orientation: patient is oriented to person, day, month, year, at first is incorrect about date but self corrects. Able to express reason for hospitalization but speech intelligibility is decreased for unfamiliar listener - Verbal cues provided to slow rate of speech. Pt is responsive to verbal cues to increase intelligibility.   Follow 1 part commands:  related to oral mechanism evaluation 100% acc with timely responses    Daughter and patient report conflicting information regarding dysarthria and slurred speech. Patient reports it is residual from stroke in September. Daughter reports it has worsened from prior stroke.     Did not complete comprehensive cognitive communication or motor speech evaluation secondary to patient requesting to take shower, shower water already running. Recommend further assessment to determine current level of function.     Assessment:     Kaila Scott is a 64 y.o. female with an SLP diagnosis of dysarthria and mild oral dysphagia s/p stroke like symptoms - pending imaging. 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 Recommend initiation of regular solids and thin liquids with safe swallow strategies including: secondary swallow or liquid wash to clear lingual residuals. Skilled speech intervention indicated for diet tolerance and ongoing assessment of dysphagia. Skilled speech intervention for ongoing assessment of cognitive communication status.     Goals:   Multidisciplinary Problems     SLP Goals        Problem: SLP Goal    Goal Priority Disciplines Outcome   SLP Goal    Medium SLP Ongoing, Progressing   Description:  1. Pt will be able to consume regular solids and thin liquids independently with no oral or lingual residuals and overt s/s of airway threat or  aspiration  2. Ongoing assessment of cognitive communication function                     Plan:     · Patient to be seen:  4 x/week, 5 x/week   · Plan of Care expires:     · Plan of Care reviewed with:  patient, daughter   · SLP Follow-Up:  Yes       Discharge recommendations:  other (see comments)(to be determined)       Time Tracking:     SLP Treatment Date:   01/29/20  Speech Start Time:  1000  Speech Stop Time:  1011     Speech Total Time (min):  11 min    Billable Minutes: Eval Swallow and Oral Function 11 min    Cely Chiang CCC-SLP  01/29/2020

## 2020-01-29 NOTE — PLAN OF CARE
Problem: SLP Goal  Goal: SLP Goal  Description  1. Pt will be able to consume regular solids and thin liquids independently with no oral or lingual residuals and overt s/s of airway threat or aspiration  2. Ongoing assessment of cognitive communication function    Outcome: Ongoing, Progressing   Pt was seen on this date for Bedside swallow evaluation

## 2020-01-29 NOTE — ED PROVIDER NOTES
Encounter Date: 1/28/2020    SCRIBE #1 NOTE: Sachi PATTERSON, am scribing for, and in the presence of, Dr. Mckeon.       History     Chief Complaint   Patient presents with    Extremity Weakness     left sided that started today- hx stroke affecting this side, but feels she is more weak today than normal.      Time seen by provider: 11:23 PM    This is a 64 y.o. female who presents with complaint of left sided extremity weakness. Pt states the weakness initially began last Thursday when she got up to use the bathroom and fell. She states today, her left side feels completely weak. She states she had 2 strokes in 2019, September and December, but denies left sided weakness after last stroke. She reports back pain and lightheadedness. She denies chills, dysuria, or fever. She states she is compliant with her Plavix and ASA.    The history is provided by the patient and medical records.     Review of patient's allergies indicates:  No Known Allergies  Past Medical History:   Diagnosis Date    Arthritis     Back pain with sciatica     Diabetes mellitus     Hypertension     Stroke      No past surgical history on file.  Family History   Problem Relation Age of Onset    Cancer Brother      Social History     Tobacco Use    Smoking status: Current Some Day Smoker     Packs/day: 0.50     Years: 40.00     Pack years: 20.00     Types: Cigarettes    Smokeless tobacco: Never Used   Substance Use Topics    Alcohol use: No     Comment: Recently stopped    Drug use: No     Review of Systems   Constitutional: Negative for chills and fever.   HENT: Negative for congestion, rhinorrhea and sore throat.    Eyes: Negative for visual disturbance.   Respiratory: Negative for cough and shortness of breath.    Cardiovascular: Negative for chest pain.   Gastrointestinal: Negative for abdominal pain, diarrhea, nausea and vomiting.   Genitourinary: Negative for dysuria.   Musculoskeletal: Positive for back pain.   Skin: Negative  for rash.   Neurological: Positive for weakness (left sided extremities) and light-headedness (woozy). Negative for dizziness.   Psychiatric/Behavioral: Negative for confusion.       Physical Exam     Initial Vitals [01/28/20 2307]   BP Pulse Resp Temp SpO2   (!) 169/93 (!) 56 18 97.7 °F (36.5 °C) 95 %      MAP       --         Physical Exam    Nursing note and vitals reviewed.  Constitutional: She appears well-developed and well-nourished. She is not diaphoretic. No distress.   HENT:   Head: Normocephalic and atraumatic.   Loss of nasal fold on the left side.   Eyes: Conjunctivae and EOM are normal. Pupils are equal, round, and reactive to light. No scleral icterus.   Neck: Normal range of motion. Neck supple.   Cardiovascular: Normal rate, regular rhythm and normal heart sounds. Exam reveals no gallop and no friction rub.    No murmur heard.  Pulmonary/Chest: Breath sounds normal. No respiratory distress. She has no wheezes. She has no rhonchi. She has no rales.   Abdominal: Soft. Bowel sounds are normal. She exhibits no distension. There is no tenderness. There is no rebound and no guarding.   Musculoskeletal: Normal range of motion. She exhibits no edema or tenderness.   Neurological: She is alert and oriented to person, place, and time.   Cranial nerves 3-12 grossly intact. Strength 4/5 on left upper and lower extremities. Strength 5/5 on right upper and lower extremities. Good finger to nose task ability. Good heel to shin.    Skin: Skin is warm and dry.   Slurred speech.          ED Course   Procedures  Labs Reviewed   COMPREHENSIVE METABOLIC PANEL - Abnormal; Notable for the following components:       Result Value    Potassium 3.2 (*)     Glucose 183 (*)     eGFR if non  53 (*)     All other components within normal limits   URINALYSIS, REFLEX TO URINE CULTURE - Abnormal; Notable for the following components:    Specific Gravity, UA <=1.005 (*)     All other components within normal limits     Narrative:     Preferred Collection Type->Urine, Clean Catch   POCT GLUCOSE - Abnormal; Notable for the following components:    POCT Glucose 197 (*)     All other components within normal limits   CBC W/ AUTO DIFFERENTIAL   PROTIME-INR   TSH   LIPID PANEL   ISTAT PROCEDURE   ISTAT CREATININE     EKG Readings: (Independently Interpreted)   Normal Sinus Rhythm at a rate of 63. First degree block. No ST-T wave changes compared to 9/2019.        Imaging Results          CT Head Without Contrast (Final result)  Result time 01/28/20 23:31:22    Final result by Mahesh Mann MD (01/28/20 23:31:22)                 Impression:      1. No acute intracranial process.  2. Involutional changes with chronic microvascular ischemic changes and small remote lacunar infarcts in the bilateral lateral basal ganglia and caudate heads.      Electronically signed by: Mahesh Mann  Date:    01/28/2020  Time:    23:31             Narrative:    EXAMINATION:  CT HEAD WITHOUT CONTRAST    CLINICAL HISTORY:  Stroke;    TECHNIQUE:  Low dose axial CT images obtained throughout the head without intravenous contrast. Sagittal and coronal reconstructions were performed.    COMPARISON:  12/18/2018    FINDINGS:  Intracranial compartment:    Ventricles and sulci are normal in size for age without evidence of hydrocephalus. No extra-axial blood or fluid collections.    Moderate involutional changes and chronic microvascular ischemic changes in the periventricular and subcortical white matter.    Small remote lacunar infarcts of the lateral basal ganglia bilaterally and bilateral caudate heads.    No evidence of acute major vascular infarction.  No mass effect or edema.    No parenchymal mass, hemorrhage, edema or major vascular distribution infarct.    Skull/extracranial contents (limited evaluation): No fracture. Mastoid air cells and paranasal sinuses are essentially clear.                                 Medical Decision Making:   History:    Old Medical Records: I decided to obtain old medical records.  Initial Assessment:   64-year-old female with history of 2 previous CVAs presents with report of left-sided weakness and slurred speech which has been present for 5 days.  Patient reports her previous CVAs resulted in left-sided weakness and slurred speech but both times the symptoms resolved after rehab.  She denies any new symptoms today.  On exam she did have very mild weakness of the left upper and lower extremities compared to the right.  She did also minimal slurred speech and loss of the nasal labial fold on the left. There were no other significant findings on exam.  Independently Interpreted Test(s):   I have ordered and independently interpreted EKG Reading(s) - see prior notes  Clinical Tests:   Lab Tests: Ordered and Reviewed  Radiological Study: Ordered and Reviewed  Medical Tests: Ordered and Reviewed  ED Management:  A stroke code was activated in triage.  Patient received a CT of the head as well as lab work.  CT showed no evidence of acute CVA.  Labs were remarkable only for a potassium of 3.2 for which she received oral replacement.  An interventional neurology consult was not initially obtained given that her symptoms have been present for 5 days.  However, at the request of the hospitalist I did discuss the case with Stroke Neurology on-call.  He did not directly evaluate the patient but based on her presentation and workup thus far recommended an MRI, vascular imaging, and further risk stratification.  This was conveyed to the moonlighter and the patient was admitted to the hospitalist service.            Scribe Attestation:   Scribe #1: I performed the above scribed service and the documentation accurately describes the services I performed. I attest to the accuracy of the note.    Attending Attestation:           Physician Attestation for Scribe:  Physician Attestation Statement for Scribe #1: I, Dr. Mckeon, reviewed  documentation, as scribed by Sachi Hayward in my presence, and it is both accurate and complete.                 ED Course as of Jan 29 0127   Wed Jan 29, 2020   0105 Will admit to Dr. Galvin.    [DM]      ED Course User Index  [DM] Neena Hayward                Clinical Impression:     1. Left-sided weakness    2. Stroke                                Kayli Mckeon MD  01/29/20 0127

## 2020-01-29 NOTE — PLAN OF CARE
"Met with patient at bedside to complete discharge planning assessment - patient is current with [facility] for primary care - pharmacy of choice is [facility] on  [address] but patient would like to utilize bedside delivery - patient lives alone & reports independence in ADLs - daughter will provide transportation home - patient denies a living will or POA - discussed insurance being out of network with [facility] but patient declined assistance with initiating transfer stating "I always get all my care here & this is where I want to be"      01/29/20 1153   Discharge Assessment   Assessment Type Discharge Planning Assessment   Confirmed/corrected address and phone number on facesheet? Yes   Assessment information obtained from? Patient   Communicated expected length of stay with patient/caregiver no   Prior to hospitilization cognitive status: Alert/Oriented   Prior to hospitalization functional status: Independent   Current cognitive status: Alert/Oriented   Current Functional Status: Independent   Lives With alone   Able to Return to Prior Arrangements yes   Is patient able to care for self after discharge? Yes   Patient's perception of discharge disposition home or selfcare   Readmission Within the Last 30 Days no previous admission in last 30 days   Patient currently being followed by outpatient case management? No   Patient currently receives any other outside agency services? No   Equipment Currently Used at Home none   Do you have any problems affording any of your prescribed medications? No   Is the patient taking medications as prescribed? yes   Does the patient have transportation home? Yes   Transportation Anticipated family or friend will provide   Discharge Plan A   (TBD)   Patient/Family in Agreement with Plan yes     "

## 2020-01-29 NOTE — ASSESSMENT & PLAN NOTE
CT- No acute intracranial process.  Involutional changes with chronic microvascular ischemic changes and small remote lacunar infarcts in the bilateral lateral basal ganglia and caudate heads    MRI/MRA Brain/Neck  Echo  Lipid Panel  ASA 81  Lipitor  SLP/PT/OT consult  Consult Neuro

## 2020-01-29 NOTE — H&P
[facility] Medicine  History & Physical    Patient Name: Kaila Scott  MRN: [id number]  Admission Date: 1/28/2020  Attending Physician: Tamara Rainey MD   Primary Care Provider: [facility]         Patient information was obtained from patient, past medical records and ER records.     Subjective:     Principal Problem:Stroke-like symptoms    Chief Complaint:   Chief Complaint   Patient presents with    Extremity Weakness     left sided that started today- hx stroke affecting this side, but feels she is more weak today than normal.         HPI: The patient is a 64 y.o. female who presents with complaint of left sided extremity weakness. Pt states the weakness initially began last Thursday when she got up to use the bathroom and fell. She states today, her left side feels completely weak. She states she had 2 strokes in 2019, September and December, but denies left sided weakness after last stroke. She reports back pain and lightheadedness. She denies chills, dysuria, or fever. She states she is compliant with her Plavix and ASA.    Past Medical History:   Diagnosis Date    Arthritis     Back pain with sciatica     Diabetes mellitus     Hypertension     Stroke        No past surgical history on file.    Review of patient's allergies indicates:  No Known Allergies    No current facility-administered medications on file prior to encounter.      Current Outpatient Medications on File Prior to Encounter   Medication Sig    aspirin (ECOTRIN) 81 MG EC tablet Take 1 tablet (81 mg total) by mouth once daily.    carvedilol (COREG) 25 MG tablet Take 25 mg by mouth 2 (two) times daily with meals.    clopidogrel (PLAVIX) 75 mg tablet Take 1 tablet (75 mg total) by mouth once daily.    lisinopril (PRINIVIL,ZESTRIL) 20 MG tablet Take 40 mg by mouth once daily.     atorvastatin (LIPITOR) 80 MG tablet Take 1 tablet (80 mg total) by mouth once daily.    lidocaine  (LIDODERM) 5 % Place 1 patch onto the skin once daily. Up to 3 patches may be applied in a single application. Patch(es) may remain in place for up to 12 hours in any 24-hour period.    metformin (GLUCOPHAGE) 500 MG tablet Take 1 tablet (500 mg total) by mouth 2 (two) times daily with meals. (Patient taking differently: Take 500 mg by mouth daily with breakfast. )    oxybutynin (DITROPAN-XL) 5 MG TR24 Take 1 tablet (5 mg total) by mouth once daily.    tiZANidine (ZANAFLEX) 2 MG tablet Take 2 tablets (4 mg total) by mouth every 8 (eight) hours as needed (muscle aches).    traZODone (DESYREL) 50 MG tablet Take 50 mg by mouth every evening.     Family History     Problem Relation (Age of Onset)    Cancer Brother        Tobacco Use    Smoking status: Current Some Day Smoker     Packs/day: 0.50     Years: 40.00     Pack years: 20.00     Types: Cigarettes    Smokeless tobacco: Never Used   Substance and Sexual Activity    Alcohol use: No     Comment: Recently stopped    Drug use: No    Sexual activity: Not Currently     Review of Systems   Constitutional: Negative for activity change, appetite change and fever.   HENT: Negative for congestion, ear pain, rhinorrhea and sinus pressure.    Eyes: Negative for pain and discharge.   Respiratory: Negative for cough, chest tightness, shortness of breath and wheezing.    Cardiovascular: Negative for chest pain and leg swelling.   Gastrointestinal: Negative for abdominal distention, abdominal pain, diarrhea, nausea and vomiting.   Endocrine: Negative for cold intolerance and heat intolerance.   Genitourinary: Negative for difficulty urinating, flank pain, frequency, hematuria and urgency.   Musculoskeletal: Positive for myalgias. Negative for arthralgias and joint swelling.   Allergic/Immunologic: Negative for environmental allergies and food allergies.   Neurological: Positive for weakness (left sided). Negative for dizziness, light-headedness and headaches.    Hematological: Does not bruise/bleed easily.   Psychiatric/Behavioral: Negative for agitation, behavioral problems and decreased concentration.     Objective:     Vital Signs (Most Recent):  Temp: 97.9 °F (36.6 °C) (01/29/20 0204)  Pulse: 69 (01/29/20 0241)  Resp: 18 (01/29/20 0204)  BP: (!) 141/69 (01/29/20 0204)  SpO2: 96 % (01/29/20 0204) Vital Signs (24h Range):  Temp:  [97.7 °F (36.5 °C)-97.9 °F (36.6 °C)] 97.9 °F (36.6 °C)  Pulse:  [56-69] 69  Resp:  [15-18] 18  SpO2:  [95 %-98 %] 96 %  BP: (139-169)/(67-93) 141/69     Weight: 77.1 kg (169 lb 15.6 oz)  Body mass index is 25.1 kg/m².    Physical Exam   Constitutional: She is oriented to person, place, and time. She appears well-developed and well-nourished.   HENT:   Head: Normocephalic.   Eyes: Conjunctivae are normal. Right eye exhibits no discharge. Left eye exhibits no discharge.   Neck: Normal range of motion. Neck supple.   Cardiovascular: Normal rate, regular rhythm and normal heart sounds.   Pulses:       Radial pulses are 2+ on the right side, and 2+ on the left side.   Pulmonary/Chest: Effort normal. Tachypnea noted. No respiratory distress. She has decreased breath sounds in the right lower field and the left lower field.   Abdominal: Soft. She exhibits no distension. Bowel sounds are decreased. There is no tenderness.   Musculoskeletal: Normal range of motion.   Neurological: She is alert and oriented to person, place, and time. She has normal strength. GCS eye subscore is 4. GCS verbal subscore is 5. GCS motor subscore is 6.   Skin: Skin is warm and dry.   Psychiatric: She has a normal mood and affect. Her speech is normal and behavior is normal.           Significant Labs:   CBC:   Recent Labs   Lab 01/28/20  2336   WBC 8.26   HGB 13.5   HCT 39.9        CMP:   Recent Labs   Lab 01/28/20  2336      K 3.2*      CO2 27   *   BUN 11   CREATININE 1.1   CALCIUM 9.4   PROT 7.3   ALBUMIN 3.8   BILITOT 0.4   ALKPHOS 71   AST 14    ALT 18   ANIONGAP 10   EGFRNONAA 53*       Significant Imaging: I have reviewed all pertinent imaging results/findings within the past 24 hours.    Assessment/Plan:     * Stroke-like symptoms  CT- No acute intracranial process.  Involutional changes with chronic microvascular ischemic changes and small remote lacunar infarcts in the bilateral lateral basal ganglia and caudate heads    MRI/MRA Brain/Neck  Echo  Lipid Panel  ASA 81  Lipitor  SLP/PT/OT consult  Consult Neuro        Essential hypertension  Normotensive currently    Hold BP meds       Hyperlipidemia  Lipid Panel pending    Lipitor 80 mg continued        VTE Risk Mitigation (From admission, onward)         Ordered     IP VTE LOW RISK PATIENT  Once      01/29/20 0207     Place sequential compression device  Until discontinued      01/29/20 0207                   Josemanuel Conrad NP  Department of Hospital Medicine   [facility]

## 2020-01-29 NOTE — PT/OT/SLP EVAL
Occupational Therapy   Evaluation/Discharge    Name: Kaila Scott  MRN: [id number]  Admitting Diagnosis:  Stroke-like symptoms      Recommendations:     Discharge Recommendations: home health OT(Home Health or Out-Pt OT for hand rehabilitation), Out-Pt OT driving evaluation  Discharge Equipment Recommendations:     Barriers to discharge:       Assessment:     Kaila Scott is a 64 y.o. female with a medical diagnosis of Stroke-like symptoms.  She presents with concern about loss of Left hand function.. Performance deficits affecting function:  She presented with mild impairment in LUE timing and coordination with new onset impairment of Left hand dexterity.  She was MI bed mobility, sit><stand, toilet transfer, ambulation for self-care and MI basic self-care (G/H at sink, UBD, and LBD).  No hospital based OT treatment needs identified.  D/C OT    Rehab Prognosis: Good; patient would benefit from acute skilled OT services to address these deficits and reach maximum level of function.       Plan:     Patient to be seen   to address the above listed problems via    · Plan of Care Expires:    · Plan of Care Reviewed with:      Subjective     Chief Complaint: loss of Left hand function  Patient/Family Comments/goals: return to PLOF    Occupational Profile:  Living Environment: Lives alone in a 2 story apartment with no steps at entry (stays on first floor)  Previous level of function: ambulatory without AD, drives a car, MI ADLs and IADLs, including household tasks  Roles and Routines: Shinto, shopping  Equipment Used at Home: owns a rollator (not in use) and shower chair    Assistance upon Discharge: she has a sitter 4 hours a day    Pain/Comfort:  · Pain Rating 1: 0/10  · Pain Rating Post-Intervention 1: 0/10    Patients cultural, spiritual, Yarsanism conflicts given the current situation: no    Objective:     Communicated with: patient and her nurse prior to session.  Patient found supine with peripheral IV,  telemetry upon OT entry to room.  Pt was seen on the second attempt.    General Precautions: Standard, diabetic, aspiration, fall   Orthopedic Precautions:N/A   Braces: N/A     Occupational Performance:    Bed Mobility:    · MI supine>sit EOB  · MI sit>supine    Functional Mobility/Transfers:  · Independent sit><stand,   · MI toilet transfer  ·   · Functional Mobility: ambulated bed>bathroom>sink> linen bin to discard wash cloth>bed independent with mild gait instability (no LOB)    Activities of Daily Living:  · MI G/H (wash hands, declined other G/H tasks ) standing at sink  · MI UBD (don robe) standing EOB  · MI LBD (doff/don pajama bottoms and socks) sitting and standing EOB  · Declined toilet use    Cognitive/Visual Perceptual:  Cognitive/Psychosocial Skills:     -       Oriented to: Person, Place, Time and Situation   -       Follows Commands/attention:Follows two-step commands  -       Communication: dysarthria  -       Memory: No Deficits noted  -       Safety awareness/insight to disability: intact and for tasks performed   -       Mood/Affect/Coping skills/emotional control: Appropriate to situation  Visual/Perceptual:      -Impaired  tracking, visual field and glasses, impairement in Right eye (deficits did not appear to be of new onset) dentures at home    Physical Exam:  Balance:    -       sitting balance WFL, mild LE balance and gait instability  Postural examination/scapula alignment:    -       No postural abnormalities identified  Skin integrity: Visible skin intact  Edema:  None noted  Sensation:    -       Intact Right / Impaired Left hand for light touch  Muscle Tone: RUE grossly intact / Left arm grossly intact with mild hypotonia Left hand  Motor Planning:    -       WFL  Dominant hand:    -       Right  UE ROM: WFL BUE  UE strength and coordination: RUE 4/5, LUE: strength 4-/5 with mild timing and coordination deficits compared to RUE  Hand Function: Right Hand WFL for  strength and  dexterity                            Left Hand: gross grasp-release and pinch present with impaired                                              Coordination, in-hand manipulation poor  AMPAC 6 Click ADL:  AMPAC Total Score: 24    Patient left supine with all lines intact, call button in reach, chair alarm on and nurse notified    GOALS:   Multidisciplinary Problems     Occupational Therapy Goals        Problem: Occupational Therapy Goal    Goal Priority Disciplines Outcome Interventions   Occupational Therapy Goal     OT, PT/OT     Description:  No goals                    History:     Past Medical History:   Diagnosis Date    Arthritis     Back pain with sciatica     Diabetes mellitus     Hypertension     Stroke        No past surgical history on file.    Time Tracking:     OT Date of Treatment: 01/29/20  OT Start Time: 1538  OT Stop Time: 1602  OT Total Time (min): 24 min    Billable Minutes:Evaluation 24    Estefania Cool, Terry, LOTR  1/29/2020     decreased strength/decreased ROM/impaired balance

## 2020-01-29 NOTE — ED TRIAGE NOTES
Hx of September 2018. Pt has residual slurred speech and weakness in her left leg from her previous stroke. Pt is reporting a fall after feeling weak on Thursday, and has been feeling weak ever since. Pt also says her speech has been more slurred since Thursday as well. Pt denies any other s/s.

## 2020-01-30 VITALS
WEIGHT: 170 LBS | RESPIRATION RATE: 18 BRPM | OXYGEN SATURATION: 97 % | TEMPERATURE: 98 F | HEIGHT: 69 IN | BODY MASS INDEX: 25.18 KG/M2 | HEART RATE: 64 BPM | DIASTOLIC BLOOD PRESSURE: 88 MMHG | SYSTOLIC BLOOD PRESSURE: 187 MMHG

## 2020-01-30 PROBLEM — I69.30 LATE EFFECT OF STROKE: Status: ACTIVE | Noted: 2020-01-30

## 2020-01-30 PROBLEM — I63.9 ISCHEMIC STROKE OF FRONTAL LOBE: Status: ACTIVE | Noted: 2020-01-29

## 2020-01-30 LAB
ALBUMIN SERPL BCP-MCNC: 3.7 G/DL (ref 3.5–5.2)
ALP SERPL-CCNC: 64 U/L (ref 55–135)
ALT SERPL W/O P-5'-P-CCNC: 15 U/L (ref 10–44)
ANION GAP SERPL CALC-SCNC: 10 MMOL/L (ref 8–16)
AORTIC ROOT ANNULUS: 2.38 CM
AORTIC VALVE CUSP SEPERATION: 1.49 CM
ASCENDING AORTA: 2.4 CM
AST SERPL-CCNC: 15 U/L (ref 10–40)
AV INDEX (PROSTH): 0.9
AV MEAN GRADIENT: 3 MMHG
AV PEAK GRADIENT: 6 MMHG
AV VALVE AREA: 2.68 CM2
AV VELOCITY RATIO: 0.88
BASOPHILS # BLD AUTO: 0.04 K/UL (ref 0–0.2)
BASOPHILS NFR BLD: 0.6 % (ref 0–1.9)
BILIRUB SERPL-MCNC: 0.6 MG/DL (ref 0.1–1)
BSA FOR ECHO PROCEDURE: 1.94 M2
BUN SERPL-MCNC: 14 MG/DL (ref 8–23)
CALCIUM SERPL-MCNC: 9.6 MG/DL (ref 8.7–10.5)
CHLORIDE SERPL-SCNC: 108 MMOL/L (ref 95–110)
CO2 SERPL-SCNC: 25 MMOL/L (ref 23–29)
CREAT SERPL-MCNC: 0.8 MG/DL (ref 0.5–1.4)
CV ECHO LV RWT: 0.38 CM
DIFFERENTIAL METHOD: NORMAL
DOP CALC AO PEAK VEL: 1.2 M/S
DOP CALC AO VTI: 29.58 CM
DOP CALC LVOT AREA: 3 CM2
DOP CALC LVOT DIAMETER: 1.95 CM
DOP CALC LVOT PEAK VEL: 1.05 M/S
DOP CALC LVOT STROKE VOLUME: 79.28 CM3
DOP CALCLVOT PEAK VEL VTI: 26.56 CM
E WAVE DECELERATION TIME: 143.58 MSEC
E/A RATIO: 0.87
E/E' RATIO: 8.71 M/S
ECHO LV POSTERIOR WALL: 0.85 CM (ref 0.6–1.1)
EOSINOPHIL # BLD AUTO: 0.2 K/UL (ref 0–0.5)
EOSINOPHIL NFR BLD: 3 % (ref 0–8)
ERYTHROCYTE [DISTWIDTH] IN BLOOD BY AUTOMATED COUNT: 11.9 % (ref 11.5–14.5)
EST. GFR  (AFRICAN AMERICAN): >60 ML/MIN/1.73 M^2
EST. GFR  (NON AFRICAN AMERICAN): >60 ML/MIN/1.73 M^2
FRACTIONAL SHORTENING: 26 % (ref 28–44)
GLUCOSE SERPL-MCNC: 98 MG/DL (ref 70–110)
HCT VFR BLD AUTO: 40.2 % (ref 37–48.5)
HGB BLD-MCNC: 13.2 G/DL (ref 12–16)
IMM GRANULOCYTES # BLD AUTO: 0.01 K/UL (ref 0–0.04)
IMM GRANULOCYTES NFR BLD AUTO: 0.1 % (ref 0–0.5)
INTERVENTRICULAR SEPTUM: 0.77 CM (ref 0.6–1.1)
LA MAJOR: 4.97 CM
LA MINOR: 5.89 CM
LA WIDTH: 3.82 CM
LEFT ATRIUM SIZE: 3.41 CM
LEFT ATRIUM VOLUME INDEX: 31 ML/M2
LEFT ATRIUM VOLUME: 59.69 CM3
LEFT INTERNAL DIMENSION IN SYSTOLE: 3.3 CM (ref 2.1–4)
LEFT VENTRICLE DIASTOLIC VOLUME INDEX: 46.96 ML/M2
LEFT VENTRICLE DIASTOLIC VOLUME: 90.52 ML
LEFT VENTRICLE MASS INDEX: 59 G/M2
LEFT VENTRICLE SYSTOLIC VOLUME INDEX: 22.9 ML/M2
LEFT VENTRICLE SYSTOLIC VOLUME: 44.09 ML
LEFT VENTRICULAR INTERNAL DIMENSION IN DIASTOLE: 4.46 CM (ref 3.5–6)
LEFT VENTRICULAR MASS: 113.78 G
LV LATERAL E/E' RATIO: 8.22 M/S
LV SEPTAL E/E' RATIO: 9.25 M/S
LYMPHOCYTES # BLD AUTO: 2.4 K/UL (ref 1–4.8)
LYMPHOCYTES NFR BLD: 35.7 % (ref 18–48)
MAGNESIUM SERPL-MCNC: 2.1 MG/DL (ref 1.6–2.6)
MCH RBC QN AUTO: 30.8 PG (ref 27–31)
MCHC RBC AUTO-ENTMCNC: 32.8 G/DL (ref 32–36)
MCV RBC AUTO: 94 FL (ref 82–98)
MONOCYTES # BLD AUTO: 0.7 K/UL (ref 0.3–1)
MONOCYTES NFR BLD: 10.5 % (ref 4–15)
MV PEAK A VEL: 0.85 M/S
MV PEAK E VEL: 0.74 M/S
MV STENOSIS PRESSURE HALF TIME: 42 MS
MV VALVE AREA P 1/2 METHOD: 5.24 CM2
NEUTROPHILS # BLD AUTO: 3.4 K/UL (ref 1.8–7.7)
NEUTROPHILS NFR BLD: 50.1 % (ref 38–73)
NRBC BLD-RTO: 0 /100 WBC
PHOSPHATE SERPL-MCNC: 3.9 MG/DL (ref 2.7–4.5)
PLATELET # BLD AUTO: 210 K/UL (ref 150–350)
PMV BLD AUTO: 9.7 FL (ref 9.2–12.9)
POCT GLUCOSE: 109 MG/DL (ref 70–110)
POTASSIUM SERPL-SCNC: 3.5 MMOL/L (ref 3.5–5.1)
PROT SERPL-MCNC: 7.1 G/DL (ref 6–8.4)
RA MAJOR: 5.11 CM
RA WIDTH: 3.41 CM
RBC # BLD AUTO: 4.29 M/UL (ref 4–5.4)
SINUS: 2.47 CM
SODIUM SERPL-SCNC: 143 MMOL/L (ref 136–145)
STJ: 2.47 CM
TDI LATERAL: 0.09 M/S
TDI SEPTAL: 0.08 M/S
TDI: 0.09 M/S
TRICUSPID ANNULAR PLANE SYSTOLIC EXCURSION: 2.17 CM
WBC # BLD AUTO: 6.73 K/UL (ref 3.9–12.7)

## 2020-01-30 PROCEDURE — 99238 HOSP IP/OBS DSCHRG MGMT 30/<: CPT | Mod: ,,, | Performed by: HOSPITALIST

## 2020-01-30 PROCEDURE — 80053 COMPREHEN METABOLIC PANEL: CPT

## 2020-01-30 PROCEDURE — 25000003 PHARM REV CODE 250: Performed by: HOSPITALIST

## 2020-01-30 PROCEDURE — 36415 COLL VENOUS BLD VENIPUNCTURE: CPT

## 2020-01-30 PROCEDURE — 83735 ASSAY OF MAGNESIUM: CPT

## 2020-01-30 PROCEDURE — 99238 PR HOSPITAL DISCHARGE DAY,<30 MIN: ICD-10-PCS | Mod: ,,, | Performed by: HOSPITALIST

## 2020-01-30 PROCEDURE — 85025 COMPLETE CBC W/AUTO DIFF WBC: CPT

## 2020-01-30 PROCEDURE — 84100 ASSAY OF PHOSPHORUS: CPT

## 2020-01-30 PROCEDURE — 25000003 PHARM REV CODE 250: Performed by: NURSE PRACTITIONER

## 2020-01-30 PROCEDURE — 94761 N-INVAS EAR/PLS OXIMETRY MLT: CPT

## 2020-01-30 RX ORDER — OXYCODONE AND ACETAMINOPHEN 7.5; 325 MG/1; MG/1
1 TABLET ORAL ONCE
Status: COMPLETED | OUTPATIENT
Start: 2020-01-30 | End: 2020-01-30

## 2020-01-30 RX ADMIN — ASPIRIN 81 MG: 81 TABLET ORAL at 08:01

## 2020-01-30 RX ADMIN — OXYCODONE HYDROCHLORIDE AND ACETAMINOPHEN 1 TABLET: 7.5; 325 TABLET ORAL at 08:01

## 2020-01-30 RX ADMIN — OXYBUTYNIN CHLORIDE 5 MG: 5 TABLET, EXTENDED RELEASE ORAL at 08:01

## 2020-01-30 RX ADMIN — LISINOPRIL 40 MG: 20 TABLET ORAL at 08:01

## 2020-01-30 RX ADMIN — CLOPIDOGREL BISULFATE 75 MG: 75 TABLET ORAL at 08:01

## 2020-01-30 RX ADMIN — TIZANIDINE 4 MG: 4 TABLET ORAL at 05:01

## 2020-01-30 RX ADMIN — ATORVASTATIN CALCIUM 80 MG: 20 TABLET, FILM COATED ORAL at 08:01

## 2020-01-30 NOTE — PROGRESS NOTES
Pt cont to state she is leaving [facility], and asking for the papers to sign. Charge nurse and JORGE Conrad np notified. Counseled per staff. Contt to refuse.

## 2020-01-30 NOTE — PLAN OF CARE
01/30/20 0904   Final Note   Assessment Type Final Discharge Note   Anticipated Discharge Disposition Home   Hospital Follow Up  Appt(s) scheduled? Yes   Discharge plans and expectations educations in teach back method with documentation complete? Yes   Right Care Referral Info   Post Acute Recommendation No Care

## 2020-01-30 NOTE — DISCHARGE SUMMARY
[facility] Hospital Medicine  Discharge Summary      Patient Name: Kaila Scott  MRN: [id number]  Admission Date: 1/28/2020  Hospital Length of Stay: 1 days  Discharge Date and Time: 1/30/2020  8:44 AM  Attending Physician: Maryjane att. providers found   Discharging Provider: Tamara Sewell MD  Primary Care Provider: [facility]      HPI:   Ms. Scott is a 64 year old woman who presented with new left sided weakness, especially of her left arm and hand.  Symptoms began 5 days previously when she got up to use the bathroom and fell because her legs felt weak, and then she noticed her arm was weak as well.  Patient has had 2 previous strokes, in December 2018 and again in September 2019.  The strokes caused weakness of her left side and dysarthria.  She takes aspirin and Plavix and is compliant with both of them. Patient did not want to come to the hospital but says her daughter found out she was having new weakness and forced her to come.  Her other complaints include sciatica and lightheadedness.  Patient's labs were normal on presentation.  Vascular neurology was called from the ED and because her symptoms were already 5 days old she was not a candidate for tPA.  She was admitted for stroke workup.    In addition to the strokes listed above patient has type II diabetes for which she takes metformin, and she has hypertension.  She smokes cigarettes.  She has had multiple lacunar infarctions and she has extensive small vessels disease.          Hospital Course:   Patient was admitted for stroke workup.  2D echo and MRI/MRA of the brain & neck were ordered.  She was seen by PT, OT and Speech, and outpatient therapy was recommended for her hand, which seemed to be the primary area that was affected.  We also noticed she was exhibiting a degree of disinhibition, making inappropriate (personal) remarks and swearing a lot.  This raised the possibility that she had a frontal lobe  stroke.    In the meantime case management was informed that patient was now out of network for this hospital and patient was told that she would have to be transferred to another hospital in network.  Patient has always come to [facility] for her care and she was naturally upset about being told this.  Out of fear that she would receive a large bill she requested to be discharged and I could not dissuade her to wait for her MRI results and neurology consultation.  Because I did not feel she should have to sign out AMA I formally discharged her with a referral for outpatient OT and told her to ask her PCP to refer her to a vascular neurologist for follow up.  She was continued on her aspirin and Plavix.  After discharge her MRI/MRA results became available and she did indeed have a subacute stroke in the posterior right frontal lobe in addition to multiple old lacunar infarctions.  At the time of writing I have been unable to reach the patient or her daughter by phone to inform her of the results.       Consults:   Consults (From admission, onward)        Status Ordering Provider     Inpatient consult to Registered Dietitian/Nutritionist  Once     Provider:  (Not yet assigned)    Completed JENNIFER SWAIN     IP consult to case management/social work  Once     Provider:  (Not yet assigned)    Completed JENNIFER SWAIN            Final Active Diagnoses:    Diagnosis Date Noted POA    PRINCIPAL PROBLEM:  Ischemic stroke of frontal lobe [I63.9] 01/29/2020 Yes    Late effect of stroke [I69.30] 01/30/2020 Not Applicable    Dysarthria [R47.1] 09/08/2019 Yes    Essential hypertension [I10]  Yes    Type 2 diabetes mellitus without complication, without long-term current use of insulin [E11.9]  Yes    Tobacco abuse [Z72.0] 12/19/2014 Yes    Hyperlipidemia [E78.5] 12/18/2014 Yes      Problems Resolved During this Admission:       Discharged Condition: stable    Disposition: Home or Self Care    Follow  Up:  Follow-up Information     Flavio Joy MD.    Specialty:  Internal Medicine  Why:  Follow up Monday 2/3/20 as scheduled  Contact information:  [facility] [facility] [id number]  [phone]                 Patient Instructions:      Ambulatory Referral to Physical/Occupational Therapy   Referral Priority: Routine Referral Type: Physical Medicine   Referral Reason: Specialty Services Required   Requested Specialty: Occupational Therapy   Number of Visits Requested: 1     Diet diabetic     Activity as tolerated       Significant Diagnostic Studies:  MRI Brain  FINDINGS:  There is a small 7 mm focus of restricted diffusion in the right posterior frontal lobe, suggestive of a small infarct.  No significant mass effect or intracranial hemorrhage.    There is moderate amount of increased T2/FLAIR signal in the periventricular white matter, most consistent with chronic microvascular ischemic change.  Multiple remote small lacunar type infarcts with scattered hemosiderin deposition/remote microhemorrhage noted corona radiata, basal ganglia, and blayne, similar to the prior exam.  No hydrocephalus.  No extra-axial fluid collections.  No areas of abnormal enhancement.  Dural venous sinuses are patent.  IAC's grossly unremarkable.  Mild thinning of the corpus callosum.  The paranasal sinuses and mastoid air cells are clear.      Impression     Small (7 mm) focus of restricted diffusion in the right posterior frontal lobe, suggestive of acute/subacute infarct.  No significant mass effect or acute intracranial hemorrhage.    Moderate amount of scattered chronic microvascular ischemic change and multiple remote lacunar type infarcts, similar to the 09/08/2019 exam.      Electronically signed by: Danny Day MD  Date: 01/30/2020       Pending Diagnostic Studies:  2D Echocardiogram     Medications:  Reconciled Home Medications:      Medication List      CHANGE how you take these medications    metFORMIN 500 MG  tablet  Commonly known as:  GLUCOPHAGE  Take 1 tablet (500 mg total) by mouth 2 (two) times daily with meals.  What changed:  when to take this        CONTINUE taking these medications    aspirin 81 MG EC tablet  Commonly known as:  ECOTRIN  Take 1 tablet (81 mg total) by mouth once daily.     atorvastatin 80 MG tablet  Commonly known as:  LIPITOR  Take 1 tablet (80 mg total) by mouth once daily.     carvedilol 25 MG tablet  Commonly known as:  COREG  Take 25 mg by mouth 2 (two) times daily with meals.     clopidogrel 75 mg tablet  Commonly known as:  PLAVIX  Take 1 tablet (75 mg total) by mouth once daily.     lidocaine 5 %  Commonly known as:  LIDODERM  Place 1 patch onto the skin once daily. Up to 3 patches may be applied in a single application. Patch(es) may remain in place for up to 12 hours in any 24-hour period.     lisinopril 20 MG tablet  Commonly known as:  PRINIVIL,ZESTRIL  Take 40 mg by mouth once daily.     oxybutynin 5 MG Tr24  Commonly known as:  DITROPAN-XL  Take 1 tablet (5 mg total) by mouth once daily.     tiZANidine 2 MG tablet  Commonly known as:  ZANAFLEX  Take 2 tablets (4 mg total) by mouth every 8 (eight) hours as needed (muscle aches).     traZODone 50 MG tablet  Commonly known as:  DESYREL  Take 50 mg by mouth every evening.            Time spent on the discharge of patient: <30 minutes  Patient was seen and examined on the date of discharge and determined to be suitable for discharge.         Tamara Sewell MD  Department of Hospital Medicine  [facility]

## 2020-01-30 NOTE — PLAN OF CARE
No significant events overnight. Remains free from fall, injury, and skin breakdown. Voiding spontaneously. Instructed to call for assist to get up oob. Voices understanding. VSS on RA throughout the night. Positions self. Denies pain att. Cont to refuse tele and scd's after being counseled per nurse. Neuro checks performed per md order and wnl.. Acucheck 137 at hs. Plan of care reviewed with patient and all questions answered. Bed low, locked .Call light within reach. Purposeful rounding performed. Resting comfortably in bed, no other complaints at this time.

## 2020-01-30 NOTE — NURSING
"Pt's /88. Pt refused to allow RN to do an assessment. MD notified. Pt given morning meds.  Pt attempted to cut her pills in half and save the other halves for later. RN informed pt she had to take all of her medications now and could not save them. Pt got really upset shouting, "that is rude and I'm tired of this [facility]" RN witnessed Pt swallow all of her medication.     Discharge paperwork given and reviewed with pt at bedside. Pt verbalized understanding but voiced that she is in a hurry and "doesn't have time to deal with this paperwork." Pt's does not have have an IV. Pt's daughter in room stating they need to hurry up so she can make an appointment at 9am. PT refused to wait for transportation. Pt walked down the hospital arguing with her daughter.   "

## 2020-01-30 NOTE — PROGRESS NOTES
Pt refusing Iv att. Counseled per nurse and cont to refuse. Pt also stating she is leaving at  even if she is not discharged due to insurance refusal for this facility.

## 2020-01-30 NOTE — PLAN OF CARE
POC reviewed with patient. AAOx4. VS stable on room air. Complaints of pain treated with pain medication as ordered. No difficulty voiding; up to toilet. Positions self independently. Instructed to call for help ambulating. Neuro checks performed Q4. Pt swallowing without difficulty; also seen by speech therapy. Pt had echo and MRI done today. No injuries, falls, or trauma occurred during shift. Purposeful rounding completed. Bed low and locked with side rails up x 2 and call light within reach. Will continue to monitor.

## 2020-01-30 NOTE — HOSPITAL COURSE
Patient was admitted for stroke workup.  2D echo and MRI/MRA of the brain & neck were ordered.  She was seen by PT, OT and Speech, and outpatient therapy was recommended for her hand, which seemed to be the primary area that was affected.  We also noticed she was exhibiting a degree of disinhibition, making inappropriate (personal) remarks and swearing a lot.  This raised the possibility that she had a frontal lobe stroke.    In the meantime case management was informed that patient was now out of network for this hospital and patient was told that she would have to be transferred to another hospital in network.  Patient has always come to [facility] for her care and she was naturally upset about being told this.  Out of fear that she would receive a large bill she requested to be discharged and I could not dissuade her to wait for her MRI results and neurology consultation.  Because I did not feel she should have to sign out AMA I formally discharged her with a referral for outpatient OT and told her to ask her PCP to refer her to a vascular neurologist for follow up.  She was continued on her aspirin and Plavix.  After discharge her MRI/MRA results became available and she did indeed have a subacute stroke in the posterior right frontal lobe in addition to multiple old lacunar infarctions.  At the time of writing I have been unable to reach the patient or her daughter by phone to inform her of the results.

## 2020-01-30 NOTE — PT/OT/SLP PROGRESS
Physical Therapy      Patient Name:  Kaila Scott   MRN:  [id number]    Patient not seen today secondary to patient already discharged.    Jose Hernandez, PTA

## 2020-02-03 ENCOUNTER — PATIENT OUTREACH (OUTPATIENT)
Dept: ADMINISTRATIVE | Facility: CLINIC | Age: 65
End: 2020-02-03

## 2020-02-03 NOTE — PROGRESS NOTES
C3 nurse attempted to contact patient. No answer. The following message was left for the patient to return the call:  Good Morning I am a nurse calling on behalf of [facility] from the [facility].  This is a Transitional Care Call for Kaila Scott . When you have a moment please contact us at [phone] or [phone] Monday through Friday, between the hours of 8 am to 4 pm. We look forward to speaking with you. On behalf of [facility] have a nice day.    The patient does not have a scheduled HOSFU appointment within 7-14 days post hospital discharge date 01/30/2020 Non [facility] PCP  HOSFU appointment scheduling.

## 2020-03-10 ENCOUNTER — OFFICE VISIT (OUTPATIENT)
Dept: NEUROLOGY | Facility: CLINIC | Age: 65
End: 2020-03-10
Payer: MEDICARE

## 2020-03-10 VITALS
SYSTOLIC BLOOD PRESSURE: 121 MMHG | DIASTOLIC BLOOD PRESSURE: 76 MMHG | WEIGHT: 169 LBS | HEIGHT: 69 IN | BODY MASS INDEX: 25.03 KG/M2 | HEART RATE: 64 BPM

## 2020-03-10 DIAGNOSIS — I63.411 CEREBROVASCULAR ACCIDENT (CVA) DUE TO EMBOLISM OF RIGHT MIDDLE CEREBRAL ARTERY: Primary | ICD-10-CM

## 2020-03-10 PROCEDURE — 99214 PR OFFICE/OUTPT VISIT, EST, LEVL IV, 30-39 MIN: ICD-10-PCS | Mod: S$GLB,,, | Performed by: PSYCHIATRY & NEUROLOGY

## 2020-03-10 PROCEDURE — 99999 PR PBB SHADOW E&M-EST. PATIENT-LVL IV: ICD-10-PCS | Mod: PBBFAC,,, | Performed by: PSYCHIATRY & NEUROLOGY

## 2020-03-10 PROCEDURE — 3078F PR MOST RECENT DIASTOLIC BLOOD PRESSURE < 80 MM HG: ICD-10-PCS | Mod: CPTII,S$GLB,, | Performed by: PSYCHIATRY & NEUROLOGY

## 2020-03-10 PROCEDURE — 3008F BODY MASS INDEX DOCD: CPT | Mod: CPTII,S$GLB,, | Performed by: PSYCHIATRY & NEUROLOGY

## 2020-03-10 PROCEDURE — 3074F SYST BP LT 130 MM HG: CPT | Mod: CPTII,S$GLB,, | Performed by: PSYCHIATRY & NEUROLOGY

## 2020-03-10 PROCEDURE — 3074F PR MOST RECENT SYSTOLIC BLOOD PRESSURE < 130 MM HG: ICD-10-PCS | Mod: CPTII,S$GLB,, | Performed by: PSYCHIATRY & NEUROLOGY

## 2020-03-10 PROCEDURE — 99999 PR PBB SHADOW E&M-EST. PATIENT-LVL IV: CPT | Mod: PBBFAC,,, | Performed by: PSYCHIATRY & NEUROLOGY

## 2020-03-10 PROCEDURE — 99214 OFFICE O/P EST MOD 30 MIN: CPT | Mod: S$GLB,,, | Performed by: PSYCHIATRY & NEUROLOGY

## 2020-03-10 PROCEDURE — 3008F PR BODY MASS INDEX (BMI) DOCUMENTED: ICD-10-PCS | Mod: CPTII,S$GLB,, | Performed by: PSYCHIATRY & NEUROLOGY

## 2020-03-10 PROCEDURE — 3078F DIAST BP <80 MM HG: CPT | Mod: CPTII,S$GLB,, | Performed by: PSYCHIATRY & NEUROLOGY

## 2020-03-10 RX ORDER — TIZANIDINE 4 MG/1
4 TABLET ORAL EVERY 8 HOURS
Qty: 30 TABLET | Refills: 0 | Status: SHIPPED | OUTPATIENT
Start: 2020-03-10 | End: 2020-03-20

## 2020-03-10 NOTE — PROGRESS NOTES
"  Kaila Scott is a 64 y.o. year old female that  presents for stroke evaluation.  Chief Complaint   Patient presents with    Stroke    Extremity Weakness    Pain    Headache    Aphasia    Dysphagia   .     HPI:  Mrs Scott has R sciatica, HTN, DM, HLD, and history of lacunar infarcts in the right corona radiata- b/l BG and right thalamus (2018), L corona radiata (2019), and R frontal lobe (1/2020).  Patient was admitted to [facility] on 1/30 due to a fall and increased L sided weakness and had MRI brain that showed a small acute infarct R frontal region.  Review of vascular imaging done at that time ( MRA head and neck) showed no evidence of LVO, high grade intracranial arterial disease, or hemodynamically significant carotid disease, TTE demonstrated EF 60%, no wall motion abnormality, normal LA size, and lipid profile with total cholesterol 119, HDL 42, LDL 63.  She said that her main issue is her slurred speech no getting any better.  Denies recurrence of stroke like symptoms, no post stroke cognitive impairment, spells concerning for seizures, no mood changes.  Mrs Scott indicated that she is doing better " but had never totally recovered from these strokes".  Although she has to put more effort to do things, she is able to perform all her activities of daily living without assistance.  Complains of worsening R sciatica and has a n upcoming appointment to see the pain medicine team.  No HA, difficulty swallowing, vertigo, double vision, or visual disturbances.  On DAPT and atorvastatin.    Past Medical History:   Diagnosis Date    Arthritis     Back pain with sciatica     Diabetes mellitus     Hypertension     Stroke      Social History     Socioeconomic History    Marital status: Single     Spouse name: Not on file    Number of children: Not on file    Years of education: Not on file    Highest education level: Not on file   Occupational History    Not on file   Social Needs    Financial " "resource strain: Not on file    Food insecurity:     Worry: Not on file     Inability: Not on file    Transportation needs:     Medical: Not on file     Non-medical: Not on file   Tobacco Use    Smoking status: Current Some Day Smoker     Packs/day: 0.50     Years: 40.00     Pack years: 20.00     Types: Cigarettes    Smokeless tobacco: Never Used   Substance and Sexual Activity    Alcohol use: No     Comment: Recently stopped    Drug use: No    Sexual activity: Not Currently   Lifestyle    Physical activity:     Days per week: Not on file     Minutes per session: Not on file    Stress: Not on file   Relationships    Social connections:     Talks on phone: Not on file     Gets together: Not on file     Attends Spiritism service: Not on file     Active member of club or organization: Not on file     Attends meetings of clubs or organizations: Not on file     Relationship status: Not on file   Other Topics Concern    Not on file   Social History Narrative    Not on file     No past surgical history on file.  Family History   Problem Relation Age of Onset    Cancer Brother            Review of Systems  General ROS: negative for chills, fever or weight loss  Psychological ROS: negative for hallucination, depression or suicidal ideation  Ophthalmic ROS: negative for blurry vision, photophobia or eye pain  ENT ROS: negative for epistaxis, sore throat or rhinorrhea  Respiratory ROS: no cough, shortness of breath, or wheezing  Cardiovascular ROS: no chest pain or dyspnea on exertion  Gastrointestinal ROS: no abdominal pain, change in bowel habits, or black/ bloody stools  Genito-Urinary ROS: no dysuria, trouble voiding, or hematuria  Musculoskeletal ROS: negative for gait disturbance or muscular weakness  Neurological ROS: no syncope or seizures; no ataxia  Dermatological ROS: negative for pruritis, rash and jaundice      Physical Exam:  /76   Pulse 64   Ht 5' 9" (1.753 m)   Wt 76.7 kg (169 lb)   LMP " 09/08/2018 (Approximate)   BMI 24.96 kg/m²   General appearance: alert, cooperative, no distress  Constitutional:Oriented to person, place, and time.appears well-developed and well-nourished.   HEENT: Normocephalic, atraumatic, neck symmetrical, no nasal discharge   Eyes: conjunctivae/corneas clear, PERRL, EOM's intact  Lungs: clear to auscultation bilaterally, no dullness to percussion bilaterally  Heart: regular rate and rhythm without rub; no displacement of the PMI   Abdomen: soft, non-tender; bowel sounds normoactive; no organomegaly  Extremities: extremities symmetric; no clubbing, cyanosis, or edema  Integument: Skin color, texture, turgor normal; no rashes; hair distrubution normal  Neurologic:   Mental status: alert and awake, oriented x 4, comprehension, naming, and repetition intact. No right to left confusion. Performs serial 7's without difficulty. Moderate dysarthria.  CN 2-12: pupils 4 mm bilaterally, reactive to light. Fundi without papilledema. Visual fields full to confrontation. EOM full without nystagmus. Face sensation normal in all distributions. Face asymmetric due to subtle L face weakness. Hearing grossly intact. Palate elevates well. Tongue midline without atrophy or fasciculations.  Motor: subtle L sided weakness.  Sensory: intact in all modalities.  DTR's: 2+ all over.  Plantars: no tested.  Coordination: finger to nose and heel-knee-shin intact.  Gait: no ataxia or bradykinesia    LABS:    Complete Blood Count  Lab Results   Component Value Date    RBC 4.29 01/30/2020    HGB 13.2 01/30/2020    HCT 40.2 01/30/2020    MCV 94 01/30/2020    MCH 30.8 01/30/2020    MCHC 32.8 01/30/2020    RDW 11.9 01/30/2020     01/30/2020    MPV 9.7 01/30/2020    GRAN 3.4 01/30/2020    GRAN 50.1 01/30/2020    LYMPH 2.4 01/30/2020    LYMPH 35.7 01/30/2020    MONO 0.7 01/30/2020    MONO 10.5 01/30/2020    EOS 0.2 01/30/2020    BASO 0.04 01/30/2020    EOSINOPHIL 3.0 01/30/2020    BASOPHIL 0.6 01/30/2020     DIFFMETHOD Automated 01/30/2020       Comprehensive Metabolic Panel  Lab Results   Component Value Date    GLU 98 01/30/2020    BUN 14 01/30/2020    CREATININE 0.8 01/30/2020     01/30/2020    K 3.5 01/30/2020     01/30/2020    PROT 7.1 01/30/2020    ALBUMIN 3.7 01/30/2020    BILITOT 0.6 01/30/2020    AST 15 01/30/2020    ALKPHOS 64 01/30/2020    CO2 25 01/30/2020    ALT 15 01/30/2020    ANIONGAP 10 01/30/2020    EGFRNONAA >60 01/30/2020    ESTGFRAFRICA >60 01/30/2020       TSH  Lab Results   Component Value Date    TSH 1.703 01/28/2020         Assessment: 65 y/o with R sciatica, HTN, DM, HLD, and history of lacunar infarcts in the right corona radiata- b/l BG and right thalamus (2018), L corona radiata (2019), and R frontal lobe (1/2020).  NIHSS 4, mRS score 2.  Stroke recurrence despite appropriate risk factors control, DAPT, and high intensity statin.  Will explore the possibility of embolic lacunar infarcts.          ICD-10-CM ICD-9-CM    1. Cerebrovascular accident (CVA) due to embolism of right middle cerebral artery I63.411 434.11 Cardiac event monitor      Ambulatory consult to Speech Therapy      Ambulatory consult to Physical Therapy     The encounter diagnosis was Cerebrovascular accident (CVA) due to embolism of right middle cerebral artery.      Plan:  1) History of recurrent right corona radiata- b/l BG and right thalamus (2018), L corona radiata (2019), and R frontal lobe (1/2020) infarcts.  Continue DPAT for now. Will get 30 day event monitor.  2) Dysarthria, residual: speech therapy consult.  3) Mild L sided weakness, cont PT  4) HTN  5) HLD, continue atorvastatin  6) DM, as per PCP  7) Sciatica: add Tizanidine to gabapentin. Has upcoming appointment with pain medicine        Orders Placed This Encounter   Procedures    Ambulatory consult to Speech Therapy    Ambulatory consult to Physical Therapy    Cardiac event monitor           Ousmane Greer MD

## 2023-01-21 ENCOUNTER — HOSPITAL ENCOUNTER (EMERGENCY)
Facility: HOSPITAL | Age: 68
Discharge: HOME | End: 2023-01-21
Attending: EMERGENCY MEDICINE
Payer: MEDICARE

## 2023-01-21 VITALS
HEIGHT: 69 IN | HEART RATE: 47 BPM | TEMPERATURE: 98 F | SYSTOLIC BLOOD PRESSURE: 167 MMHG | BODY MASS INDEX: 24.96 KG/M2 | OXYGEN SATURATION: 97 % | RESPIRATION RATE: 16 BRPM | DIASTOLIC BLOOD PRESSURE: 72 MMHG
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
## 2023-01-21 DIAGNOSIS — S16.1XXA STRAIN OF NECK MUSCLE, INITIAL ENCOUNTER: Primary | ICD-10-CM

## 2023-01-21 LAB
ALBUMIN SERPL BCP-MCNC: 4.2 G/DL (ref 3.5–5.2)
ALP SERPL-CCNC: 67 U/L (ref 55–135)
ALT SERPL W/O P-5'-P-CCNC: 20 U/L (ref 10–44)
ANION GAP SERPL CALC-SCNC: 10 MMOL/L (ref 8–16)
APTT BLDCRRT: 27 SEC (ref 21–32)
AST SERPL-CCNC: 19 U/L (ref 10–40)
BASOPHILS # BLD AUTO: 0.04 K/UL (ref 0–0.2)
BASOPHILS NFR BLD: 0.5 % (ref 0–1.9)
BILIRUB SERPL-MCNC: 0.5 MG/DL (ref 0.1–1)
BUN SERPL-MCNC: 12 MG/DL (ref 6–30)
BUN SERPL-MCNC: 12 MG/DL (ref 8–23)
CALCIUM SERPL-MCNC: 10 MG/DL (ref 8.7–10.5)
CHLORIDE SERPL-SCNC: 103 MMOL/L (ref 95–110)
CHLORIDE SERPL-SCNC: 105 MMOL/L (ref 95–110)
CO2 SERPL-SCNC: 25 MMOL/L (ref 23–29)
CREAT SERPL-MCNC: 0.7 MG/DL (ref 0.5–1.4)
CREAT SERPL-MCNC: 0.7 MG/DL (ref 0.5–1.4)
DIFFERENTIAL METHOD: ABNORMAL
EOSINOPHIL # BLD AUTO: 0.3 K/UL (ref 0–0.5)
EOSINOPHIL NFR BLD: 3.8 % (ref 0–8)
ERYTHROCYTE [DISTWIDTH] IN BLOOD BY AUTOMATED COUNT: 12.5 % (ref 11.5–14.5)
EST. GFR  (NO RACE VARIABLE): >60 ML/MIN/1.73 M^2
ETHANOL SERPL-MCNC: <10 MG/DL
GLUCOSE SERPL-MCNC: 106 MG/DL (ref 70–110)
GLUCOSE SERPL-MCNC: 107 MG/DL (ref 70–110)
HCT VFR BLD AUTO: 39.4 % (ref 37–48.5)
HCT VFR BLD CALC: 39 %PCV (ref 36–54)
HGB BLD-MCNC: 12.8 G/DL (ref 12–16)
IMM GRANULOCYTES # BLD AUTO: 0.02 K/UL (ref 0–0.04)
IMM GRANULOCYTES NFR BLD AUTO: 0.2 % (ref 0–0.5)
INR PPP: 1 (ref 0.8–1.2)
LYMPHOCYTES # BLD AUTO: 2.8 K/UL (ref 1–4.8)
LYMPHOCYTES NFR BLD: 32.7 % (ref 18–48)
MCH RBC QN AUTO: 31.4 PG (ref 27–31)
MCHC RBC AUTO-ENTMCNC: 32.5 G/DL (ref 32–36)
MCV RBC AUTO: 97 FL (ref 82–98)
MONOCYTES # BLD AUTO: 0.7 K/UL (ref 0.3–1)
MONOCYTES NFR BLD: 8.4 % (ref 4–15)
NEUTROPHILS # BLD AUTO: 4.6 K/UL (ref 1.8–7.7)
NEUTROPHILS NFR BLD: 54.4 % (ref 38–73)
NRBC BLD-RTO: 0 /100 WBC
PLATELET # BLD AUTO: 263 K/UL (ref 150–450)
PMV BLD AUTO: 9.6 FL (ref 9.2–12.9)
POC IONIZED CALCIUM: 1.19 MMOL/L (ref 1.06–1.42)
POC TCO2 (MEASURED): 28 MMOL/L (ref 23–29)
POTASSIUM BLD-SCNC: 3.5 MMOL/L (ref 3.5–5.1)
POTASSIUM SERPL-SCNC: 3.5 MMOL/L (ref 3.5–5.1)
PROT SERPL-MCNC: 7.7 G/DL (ref 6–8.4)
PROTHROMBIN TIME: 10.9 SEC (ref 9–12.5)
RBC # BLD AUTO: 4.08 M/UL (ref 4–5.4)
SAMPLE: NORMAL
SODIUM BLD-SCNC: 142 MMOL/L (ref 136–145)
SODIUM SERPL-SCNC: 140 MMOL/L (ref 136–145)
WBC # BLD AUTO: 8.45 K/UL (ref 3.9–12.7)

## 2023-01-21 PROCEDURE — 76705 PR US, ABDOMEN LIMITED: ICD-10-PCS | Mod: 26,,, | Performed by: EMERGENCY MEDICINE

## 2023-01-21 PROCEDURE — 63600175 PHARM REV CODE 636 W HCPCS: Performed by: EMERGENCY MEDICINE

## 2023-01-21 PROCEDURE — 99291 CRITICAL CARE FIRST HOUR: CPT | Mod: 25,,, | Performed by: EMERGENCY MEDICINE

## 2023-01-21 PROCEDURE — 76604 PR US CHEST / UPPER BACK: ICD-10-PCS | Mod: 26,,, | Performed by: EMERGENCY MEDICINE

## 2023-01-21 PROCEDURE — 85610 PROTHROMBIN TIME: CPT | Performed by: EMERGENCY MEDICINE

## 2023-01-21 PROCEDURE — 99291 PR CRITICAL CARE, E/M 30-74 MINUTES: ICD-10-PCS | Mod: 25,,, | Performed by: EMERGENCY MEDICINE

## 2023-01-21 PROCEDURE — 93308 PR ECHO HEART XTHORACIC,LIMITED: ICD-10-PCS | Mod: 26,,, | Performed by: EMERGENCY MEDICINE

## 2023-01-21 PROCEDURE — 85025 COMPLETE CBC W/AUTO DIFF WBC: CPT | Performed by: EMERGENCY MEDICINE

## 2023-01-21 PROCEDURE — 99285 EMERGENCY DEPT VISIT HI MDM: CPT | Mod: 25

## 2023-01-21 PROCEDURE — 93308 TTE F-UP OR LMTD: CPT | Mod: 26,,, | Performed by: EMERGENCY MEDICINE

## 2023-01-21 PROCEDURE — 80053 COMPREHEN METABOLIC PANEL: CPT | Performed by: EMERGENCY MEDICINE

## 2023-01-21 PROCEDURE — 76705 ECHO EXAM OF ABDOMEN: CPT | Mod: 26,,, | Performed by: EMERGENCY MEDICINE

## 2023-01-21 PROCEDURE — 80047 BASIC METABLC PNL IONIZED CA: CPT

## 2023-01-21 PROCEDURE — 82077 ASSAY SPEC XCP UR&BREATH IA: CPT | Performed by: EMERGENCY MEDICINE

## 2023-01-21 PROCEDURE — 76604 US EXAM CHEST: CPT | Mod: 26,,, | Performed by: EMERGENCY MEDICINE

## 2023-01-21 PROCEDURE — 96375 TX/PRO/DX INJ NEW DRUG ADDON: CPT

## 2023-01-21 PROCEDURE — 96374 THER/PROPH/DIAG INJ IV PUSH: CPT

## 2023-01-21 PROCEDURE — 85730 THROMBOPLASTIN TIME PARTIAL: CPT | Performed by: EMERGENCY MEDICINE

## 2023-01-21 RX ORDER — KETOROLAC TROMETHAMINE 30 MG/ML
15 INJECTION, SOLUTION INTRAMUSCULAR; INTRAVENOUS
Status: COMPLETED | OUTPATIENT
Start: 2023-01-21 | End: 2023-01-21

## 2023-01-21 RX ORDER — LIDOCAINE 50 MG/G
1 PATCH TOPICAL DAILY
Qty: 14 PATCH | Refills: 0 | Status: SHIPPED | OUTPATIENT
Start: 2023-01-21 | End: 2023-02-04

## 2023-01-21 RX ORDER — CYCLOBENZAPRINE HCL 10 MG
10 TABLET ORAL 3 TIMES DAILY PRN
Qty: 15 TABLET | Refills: 0 | Status: SHIPPED | OUTPATIENT
Start: 2023-01-21 | End: 2023-01-26

## 2023-01-21 RX ORDER — ONDANSETRON 2 MG/ML
4 INJECTION INTRAMUSCULAR; INTRAVENOUS
Status: COMPLETED | OUTPATIENT
Start: 2023-01-21 | End: 2023-01-21

## 2023-01-21 RX ADMIN — ONDANSETRON 4 MG: 2 INJECTION INTRAMUSCULAR; INTRAVENOUS at 04:01

## 2023-01-21 RX ADMIN — KETOROLAC TROMETHAMINE 15 MG: 30 INJECTION, SOLUTION INTRAMUSCULAR; INTRAVENOUS at 04:01

## 2023-01-21 NOTE — DISCHARGE INSTRUCTIONS
Take 1000 mg of acetaminophen every 6 hours for pain     Call your primary care clinic on Monday to notify them of your visit in the emergency department to schedule follow up with them this week     See the attached instructions for how to care for your symptoms at home     If you experience dizziness or lightheadedness or any new or concerning symptoms, return to the emergency department
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
## 2023-01-21 NOTE — ED NOTES
I-STAT Chem-8+ Results:   Value Reference Range   Sodium 142 136-145 mmol/L   Potassium  3.5 3.5-5.1 mmol/L   Chloride 103  mmol/L   Ionized Calcium 1.19 1.06-1.42 mmol/L   CO2 (measured) 28 23-29 mmol/L   Glucose 107  mg/dL   BUN 12 6-30 mg/dL   Creatinine 0.7 0.5-1.4 mg/dL   Hematocrit 39 36-54%
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
## 2024-09-25 ENCOUNTER — HOSPITAL ENCOUNTER (EMERGENCY)
Facility: OTHER | Age: 69
Discharge: HOME | End: 2024-09-25
Attending: EMERGENCY MEDICINE
Payer: MEDICARE

## 2024-09-25 VITALS
TEMPERATURE: 98 F | HEART RATE: 80 BPM | OXYGEN SATURATION: 97 % | WEIGHT: 190 LBS | HEIGHT: 69 IN | SYSTOLIC BLOOD PRESSURE: 146 MMHG | RESPIRATION RATE: 18 BRPM | BODY MASS INDEX: 28.14 KG/M2 | DIASTOLIC BLOOD PRESSURE: 70 MMHG

## 2024-09-25 DIAGNOSIS — L23.5 CHEMICAL INDUCED ALLERGIC CONTACT DERMATITIS: ICD-10-CM

## 2024-09-25 DIAGNOSIS — M79.642 BILATERAL HAND PAIN: Primary | ICD-10-CM

## 2024-09-25 DIAGNOSIS — M79.641 BILATERAL HAND PAIN: Primary | ICD-10-CM

## 2024-09-25 LAB — POCT GLUCOSE: 112 MG/DL (ref 70–110)

## 2024-09-25 PROCEDURE — 63600175 PHARM REV CODE 636 W HCPCS: Performed by: NURSE PRACTITIONER

## 2024-09-25 PROCEDURE — 82962 GLUCOSE BLOOD TEST: CPT

## 2024-09-25 PROCEDURE — 25000003 PHARM REV CODE 250: Performed by: NURSE PRACTITIONER

## 2024-09-25 PROCEDURE — 96372 THER/PROPH/DIAG INJ SC/IM: CPT | Performed by: NURSE PRACTITIONER

## 2024-09-25 PROCEDURE — 99284 EMERGENCY DEPT VISIT MOD MDM: CPT | Mod: 25

## 2024-09-25 RX ORDER — AMOXICILLIN AND CLAVULANATE POTASSIUM 875; 125 MG/1; MG/1
1 TABLET, FILM COATED ORAL 2 TIMES DAILY
Qty: 14 TABLET | Refills: 0 | Status: SHIPPED | OUTPATIENT
Start: 2024-09-25

## 2024-09-25 RX ORDER — DIPHENHYDRAMINE HCL 25 MG
25 CAPSULE ORAL EVERY 6 HOURS PRN
Qty: 20 CAPSULE | Refills: 0 | Status: SHIPPED | OUTPATIENT
Start: 2024-09-25

## 2024-09-25 RX ORDER — DIPHENHYDRAMINE HCL 25 MG
25 CAPSULE ORAL EVERY 6 HOURS PRN
Qty: 20 CAPSULE | Refills: 0 | Status: SHIPPED | OUTPATIENT
Start: 2024-09-25 | End: 2024-09-25

## 2024-09-25 RX ORDER — AMOXICILLIN AND CLAVULANATE POTASSIUM 875; 125 MG/1; MG/1
1 TABLET, FILM COATED ORAL
Status: COMPLETED | OUTPATIENT
Start: 2024-09-25 | End: 2024-09-25

## 2024-09-25 RX ORDER — METHYLPREDNISOLONE SOD SUCC 125 MG
125 VIAL (EA) INJECTION
Status: COMPLETED | OUTPATIENT
Start: 2024-09-25 | End: 2024-09-25

## 2024-09-25 RX ORDER — HYDROCODONE BITARTRATE AND ACETAMINOPHEN 5; 325 MG/1; MG/1
1 TABLET ORAL
Status: COMPLETED | OUTPATIENT
Start: 2024-09-25 | End: 2024-09-25

## 2024-09-25 RX ORDER — HYDROCODONE BITARTRATE AND ACETAMINOPHEN 5; 325 MG/1; MG/1
1 TABLET ORAL EVERY 4 HOURS PRN
Qty: 18 TABLET | Refills: 0 | Status: SHIPPED | OUTPATIENT
Start: 2024-09-25 | End: 2024-09-25

## 2024-09-25 RX ORDER — FAMOTIDINE 20 MG/1
20 TABLET, FILM COATED ORAL 2 TIMES DAILY
Qty: 20 TABLET | Refills: 0 | Status: SHIPPED | OUTPATIENT
Start: 2024-09-25 | End: 2025-09-25

## 2024-09-25 RX ORDER — HYDROCODONE BITARTRATE AND ACETAMINOPHEN 5; 325 MG/1; MG/1
1 TABLET ORAL EVERY 4 HOURS PRN
Qty: 18 TABLET | Refills: 0 | Status: SHIPPED | OUTPATIENT
Start: 2024-09-25 | End: 2024-09-28

## 2024-09-25 RX ORDER — DIPHENHYDRAMINE HCL 25 MG
25 CAPSULE ORAL
Status: COMPLETED | OUTPATIENT
Start: 2024-09-25 | End: 2024-09-25

## 2024-09-25 RX ORDER — AMOXICILLIN AND CLAVULANATE POTASSIUM 875; 125 MG/1; MG/1
1 TABLET, FILM COATED ORAL 2 TIMES DAILY
Qty: 14 TABLET | Refills: 0 | Status: SHIPPED | OUTPATIENT
Start: 2024-09-25 | End: 2024-09-25

## 2024-09-25 RX ORDER — GABAPENTIN 800 MG/1
800 TABLET ORAL 3 TIMES DAILY
COMMUNITY

## 2024-09-25 RX ORDER — FAMOTIDINE 20 MG/1
20 TABLET, FILM COATED ORAL
Status: COMPLETED | OUTPATIENT
Start: 2024-09-25 | End: 2024-09-25

## 2024-09-25 RX ORDER — FAMOTIDINE 20 MG/1
20 TABLET, FILM COATED ORAL 2 TIMES DAILY
Qty: 20 TABLET | Refills: 0 | Status: SHIPPED | OUTPATIENT
Start: 2024-09-25 | End: 2024-09-25

## 2024-09-25 RX ADMIN — HYDROCODONE BITARTRATE AND ACETAMINOPHEN 1 TABLET: 5; 325 TABLET ORAL at 07:09

## 2024-09-25 RX ADMIN — AMOXICILLIN AND CLAVULANATE POTASSIUM 1 TABLET: 875; 125 TABLET, FILM COATED ORAL at 07:09

## 2024-09-25 RX ADMIN — FAMOTIDINE 20 MG: 20 TABLET ORAL at 07:09

## 2024-09-25 RX ADMIN — DIPHENHYDRAMINE HYDROCHLORIDE 25 MG: 25 CAPSULE ORAL at 07:09

## 2024-09-25 RX ADMIN — METHYLPREDNISOLONE SODIUM SUCCINATE 125 MG: 125 INJECTION, POWDER, FOR SOLUTION INTRAMUSCULAR; INTRAVENOUS at 07:09

## 2024-09-25 NOTE — FIRST PROVIDER EVALUATION
Emergency Department TeleTriage Encounter Note      CHIEF COMPLAINT    Chief Complaint   Patient presents with    Hand Pain     Finger pain. Pain to all fingers after getting her nails done last week. She soaked them to have them removed yesterday. She reports itching to all fingers       VITAL SIGNS   Initial Vitals [09/25/24 1646]   BP Pulse Resp Temp SpO2   137/82 80 18 98.6 °F (37 °C) 97 %      MAP       --            ALLERGIES    Review of patient's allergies indicates:  No Known Allergies    PROVIDER TRIAGE NOTE  TeleTriage Note: Kaila Scott, a nontoxic/well appearing, 68 y.o. female, presented to the ED with c/o hand pain, darkened skin, and itchy skin for several months. Swelling worsened over the past week.     All ED beds are full at present; patient notified of this status.  Patient seen and medically screened by Nurse Practitioner via teletriage. Orders initiated at triage to expedite care.  Patient is stable to return to the waiting room and will be placed in an ED bed when available.  Care will be transferred to an alternate provider when patient has been placed in an Exam Room from the [facility] for physical exam, additional orders, and disposition.  5:07 PM Bren Zhou DNP, FNP-C        ORDERS  Labs Reviewed   POCT GLUCOSE - Abnormal       Result Value    POCT Glucose 112 (*)    POCT GLUCOSE MONITORING CONTINUOUS       ED Orders (720h ago, onward)      Start Ordered     Status Ordering Provider    09/25/24 1654 09/25/24 1654  POCT glucose  Once         Final result EMERGENCY, DEPT PHYSICIAN    09/25/24 1652 09/25/24 1651  POCT glucose  Once         Ordered JESSIKA SAUCEDO              Virtual Visit Note: The provider triage portion of this emergency department evaluation and documentation was performed via "[employer] a HIPAA-compliant telemedicine application, in concert with a tele-presenter in the room. A face to face patient evaluation with one of my colleagues will occur once the  patient is placed in an emergency department room.      DISCLAIMER: This note was prepared with [employer] voice recognition transcription software. Garbled syntax, mangled pronouns, and other bizarre constructions may be attributed to that software system.

## 2024-09-25 NOTE — ED NOTES
C/o dry, itching, and painful fingers all around the nail bed. States itching worsened after getting a manicure, has worsened since. Dry inflamed skin around nails present. Has  not tried any OTC. NAD, VSS; family member at bedside.

## 2024-09-25 NOTE — ED PROVIDER NOTES
".Source of History:  Patient, daughter, chart    Chief complaint:  Hand Pain (Finger pain. Pain to all fingers after getting her nails done last week. She soaked them to have them removed yesterday. She reports itching to all fingers)      HPI:  Kaila Scott is a 68 y.o. female with medical history of arthritis, back pain, diabetes, hypertension, stroke presenting with  bilateral hand and finger tip pain.  Patient states pain started last week but worsened yesterday.  Patient states she went to the nail salon where she got her nails put on and they tried to soak them off with acetone but pain increased.  Patient was unable to tolerate the procedure.  Patient states throughout today pain has continued.  Patient denies taking anything for her pain.  Patient denies any numbness or tingling.  No decreased range of motion.      This is the extent to the patients complaints today here in the emergency department.    ROS: As per HPI and below:  Constitutional: No fever.  No chills.  Eyes: No visual changes.   ENT: No sore throat. No ear pain.  Urinary: No abnormal urination.  MSK:  Positive for finger pain.  Integument: No rashes or lesions.    Review of patient's allergies indicates:  No Known Allergies    PMH:  As per HPI and below:  Past Medical History:   Diagnosis Date    Arthritis     Back pain with sciatica     Diabetes mellitus     Hypertension     Stroke      History reviewed. No pertinent surgical history.    Social History     Tobacco Use    Smoking status: Some Days     Current packs/day: 0.50     Average packs/day: 0.5 packs/day for 40.0 years (20.0 ttl pk-yrs)     Types: Cigarettes    Smokeless tobacco: Never   Substance Use Topics    Alcohol use: No     Comment: Recently stopped    Drug use: No       Physical Exam:    /82 (BP Location: Left arm)   Pulse 80   Temp 98.6 °F (37 °C) (Oral)   Resp 18   Ht 5' 9" (1.753 m)   Wt 86.2 kg (190 lb)   LMP 09/08/2018 (Approximate)   SpO2 97%   BMI 28.06 " kg/m²   Nursing note and vital signs reviewed.  Constitutional: No acute distress.  Nontoxic  Head:  Normocephalic atraumatic  Eyes: No conjunctival injection.  Extraocular muscles are intact.  ENT: Normal phonation.  Musculoskeletal:  Multiple finger tip pain with significant swelling.  Slight erythema noted around nailbed.  Discoloration surrounding nailbed noted.  Extension flexion of all hand joints within normal limits.  Plus two radial pulse bilaterally.  Skin: No rashes seen.  Good turgor.  No abrasions.  No ecchymoses.  Psych: Appropriate, conversant.    MDM    Emergent evaluation of a 69 yo female presenting for bilateral hand/finger tip pain.  Patient states all of her fingers hurts since getting artificial nails placed last week.  Patient states pain has worsened since that time.  Patient's daughter did take her to the nail salon yesterday to have the nails removed but she was unable to tolerate the soaking process.  Patient states pain has been worse since that time.  Patient denies taking anything for her pain.  Patient denies any numbness, tingling or decreased range of motion.  On exam pt is A&Ox3. VSS. Nonfebrile and nontoxic appearing.  Mucous membranes pink and moist. Pt speaking in full sentences.  Steady gait appreciated. Cap refill < 3 seconds.   Multiple finger tip pain with significant swelling.  Slight erythema noted around nailbed.  Discoloration surrounding nailbed noted.  Extension flexion of all hand joints within normal limits.  Plus two radial pulse bilaterally.    History Acquisition   Additional history was acquired from other historians.  Chart, family    The patient's list of active medical problems, social history, medications, and allergies as documented per RN staff has been reviewed.     Differential Diagnoses   Based on available information and the initial assessment, the working differential diagnoses considered during this evaluation include but are not limited to allergic  reaction, contact dermatitis, cellulitis, chemical reaction, others.    I will medicate and reassess.      LABS     Labs Reviewed   POCT GLUCOSE - Abnormal       Result Value    POCT Glucose 112 (*)    POCT GLUCOSE MONITORING CONTINUOUS     Additional Consideration   All available testing was considered during the course of this workup.  Comorbidities taken into consideration during the patient's evaluation and treatment include weight, age.    Social determinants of health were taken into consideration during development of our treatment plan.    Medications   methylPREDNISolone sodium succinate injection 125 mg (125 mg Intramuscular Given 9/25/24 1920)   diphenhydrAMINE capsule 25 mg (25 mg Oral Given 9/25/24 1919)   famotidine tablet 20 mg (20 mg Oral Given 9/25/24 1919)   HYDROcodone-acetaminophen 5-325 mg per tablet 1 tablet (1 tablet Oral Given 9/25/24 1919)   amoxicillin-clavulanate 875-125mg per tablet 1 tablet (1 tablet Oral Given 9/25/24 1919)      ED Course as of 09/25/24 1921   Wed Sep 25, 2024   1908 Patient advised that she can use warm or cool compresses to help with pain.  Advised Epson salt soaks may help with pain and swelling.  Advised to take medications as prescribed.  Patient advised to have nails removed when possible.  Strict return to ED precautions discussed.  Patient and daughter verbalized understanding of this plan of care.  All questions and concerns addressed. [RZ]   1920 Patient is hemodynamically stable, vital signs are normal. Discharge instructions given. Return to ED precautions discussed. Follow up as directed. Pt verbalized understanding of this plan.  Pt is stable for discharge.  [RZ]      ED Course User Index  [RZ] Monica Campbell NP             CLINICAL IMPRESSION  1. Bilateral hand pain    2. Chemical induced allergic contact dermatitis         ED Disposition Condition    Discharge Stable            Instruction:  I see no indication of an emergent process beyond that  addressed during our encounter but have duly counseled the patient/family regarding the need for prompt follow-up as well as the indications that should prompt immediate return to the emergency room should new or worrisome developments occur.  The patient/family has been provided with verbal and printed direction regarding our final diagnosis(es) as well as instructions regarding use of OTC and/or Rx medications intended to manage the patient's aforementioned conditions including:  ED Prescriptions       Medication Sig Dispense Start Date End Date Auth. Provider    amoxicillin-clavulanate 875-125mg (AUGMENTIN) 875-125 mg per tablet  (Status: Discontinued) Take 1 tablet by mouth 2 (two) times daily. 14 tablet 9/25/2024 9/25/2024 Monica Campbell, NP    HYDROcodone-acetaminophen (NORCO) 5-325 mg per tablet  (Status: Discontinued) Take 1 tablet by mouth every 4 (four) hours as needed for Pain. 18 tablet 9/25/2024 9/25/2024 Monica Campbell NP    famotidine (PEPCID) 20 MG tablet  (Status: Discontinued) Take 1 tablet (20 mg total) by mouth 2 (two) times daily. 20 tablet 9/25/2024 9/25/2024 Monica Campbell, NP    diphenhydrAMINE (BENADRYL) 25 mg capsule  (Status: Discontinued) Take 1 capsule (25 mg total) by mouth every 6 (six) hours as needed for Itching or Allergies. 20 capsule 9/25/2024 9/25/2024 Monica Campbell NP    amoxicillin-clavulanate 875-125mg (AUGMENTIN) 875-125 mg per tablet Take 1 tablet by mouth 2 (two) times daily. 14 tablet 9/25/2024 -- Monica Campbell, NP    diphenhydrAMINE (BENADRYL) 25 mg capsule Take 1 capsule (25 mg total) by mouth every 6 (six) hours as needed for Itching or Allergies. 20 capsule 9/25/2024 -- Monica Campbell, NP    famotidine (PEPCID) 20 MG tablet Take 1 tablet (20 mg total) by mouth 2 (two) times daily. 20 tablet 9/25/2024 9/25/2025 Monica Campbell, NP    HYDROcodone-acetaminophen (NORCO) 5-325 mg per tablet Take 1 tablet by mouth every 4 (four) hours as needed for Pain. 18  tablet 9/25/2024 9/28/2024 Monica Campbell, ALMAZ          Patient has been advised of following recommended follow-up instructions:  Follow-up Information       Follow up With Specialties Details Why Contact Info    [facility]  Schedule an appointment as soon as possible for a visit  As needed [address]  [facility] [address]  [phone]            The patient/family communicates understanding of all this information and all remaining questions and concerns were addressed at this time.      The patient's condition did warrant review of the  and prescription of controlled substances.      This note was created using dictation software.  This program may occasionally mistype words and phrases.           Monica Campbell NP  09/25/24 1922

## 2024-09-26 NOTE — DISCHARGE INSTRUCTIONS
You were seen and evaluated in the ER today.  We are going to treat you for a contact dermatitis as well as a possible allergic reaction.  Please get nails removed once you can tolerate it.  Please follow-up with your PCP as needed.  Please return to the ED for any worsening symptoms such as chest pain, shortness of breath, fever not controlled with Tylenol or ibuprofen or uncontrolled pain.      Our goal in the emergency department is to always give you outstanding care and exceptional service. You may receive a survey by mail or e-mail in the next week regarding your experience in our ED. We would greatly appreciate your completing and returning the survey. Your feedback provides us with a way to recognize our staff who give very good care and it helps us learn how to improve when your experience was below our aspiration of excellence.